# Patient Record
Sex: MALE | Race: WHITE | Employment: OTHER | URBAN - METROPOLITAN AREA
[De-identification: names, ages, dates, MRNs, and addresses within clinical notes are randomized per-mention and may not be internally consistent; named-entity substitution may affect disease eponyms.]

---

## 2017-02-10 DIAGNOSIS — E78.2 MIXED HYPERLIPIDEMIA: Primary | ICD-10-CM

## 2017-02-10 RX ORDER — ATORVASTATIN CALCIUM 20 MG/1
20 TABLET, FILM COATED ORAL DAILY
Qty: 90 TABLET | Refills: 1 | Status: SHIPPED | OUTPATIENT
Start: 2017-02-10 | End: 2017-08-06 | Stop reason: SDUPTHER

## 2017-03-03 DIAGNOSIS — I38 VALVULAR HEART DISEASE: ICD-10-CM

## 2017-03-03 DIAGNOSIS — I05.9 MITRAL VALVE DISEASE: ICD-10-CM

## 2017-03-03 DIAGNOSIS — I35.9 AORTIC VALVE DISEASE: Primary | ICD-10-CM

## 2017-04-20 ENCOUNTER — HOSPITAL ENCOUNTER (OUTPATIENT)
Dept: NON INVASIVE DIAGNOSTICS | Age: 77
Discharge: HOME | End: 2017-04-20
Attending: INTERNAL MEDICINE | Admitting: INTERNAL MEDICINE

## 2017-04-20 DIAGNOSIS — I35.9 NONRHEUMATIC AORTIC VALVE DISORDER: ICD-10-CM

## 2017-04-20 DIAGNOSIS — E78.2 MIXED HYPERLIPIDEMIA: ICD-10-CM

## 2017-04-20 DIAGNOSIS — Z12.5 SCREENING PSA (PROSTATE SPECIFIC ANTIGEN): ICD-10-CM

## 2017-04-20 LAB
A/G RATIO: 1.8 (ref 1.1–2.2)
ALBUMIN SERPL-MCNC: 4.3 G/DL (ref 3.4–5)
ALP BLD-CCNC: 118 U/L (ref 40–129)
ALT SERPL-CCNC: 20 U/L (ref 10–40)
ANION GAP SERPL CALCULATED.3IONS-SCNC: 13 MMOL/L (ref 3–16)
AST SERPL-CCNC: 28 U/L (ref 15–37)
BILIRUB SERPL-MCNC: 1 MG/DL (ref 0–1)
BUN BLDV-MCNC: 17 MG/DL (ref 7–20)
CALCIUM SERPL-MCNC: 9.2 MG/DL (ref 8.3–10.6)
CHLORIDE BLD-SCNC: 94 MMOL/L (ref 99–110)
CHOLESTEROL, TOTAL: 135 MG/DL (ref 0–199)
CO2: 27 MMOL/L (ref 21–32)
CREAT SERPL-MCNC: 1 MG/DL (ref 0.8–1.3)
GFR AFRICAN AMERICAN: >60
GFR NON-AFRICAN AMERICAN: >60
GLOBULIN: 2.4 G/DL
GLUCOSE BLD-MCNC: 100 MG/DL (ref 70–99)
HDLC SERPL-MCNC: 60 MG/DL (ref 40–60)
LDL CHOLESTEROL CALCULATED: 68 MG/DL
LV EF: 53 %
LVEF MODALITY: NORMAL
POTASSIUM SERPL-SCNC: 5.1 MMOL/L (ref 3.5–5.1)
PROSTATE SPECIFIC ANTIGEN: 2.73 NG/ML (ref 0–4)
SODIUM BLD-SCNC: 134 MMOL/L (ref 136–145)
TOTAL PROTEIN: 6.7 G/DL (ref 6.4–8.2)
TRIGL SERPL-MCNC: 33 MG/DL (ref 0–150)
VLDLC SERPL CALC-MCNC: 7 MG/DL

## 2017-04-21 ENCOUNTER — OFFICE VISIT (OUTPATIENT)
Dept: FAMILY MEDICINE CLINIC | Age: 77
End: 2017-04-21

## 2017-04-21 ENCOUNTER — OFFICE VISIT (OUTPATIENT)
Dept: CARDIOLOGY CLINIC | Age: 77
End: 2017-04-21

## 2017-04-21 VITALS
WEIGHT: 169.4 LBS | BODY MASS INDEX: 28.92 KG/M2 | HEART RATE: 89 BPM | TEMPERATURE: 97.8 F | HEIGHT: 64 IN | SYSTOLIC BLOOD PRESSURE: 120 MMHG | DIASTOLIC BLOOD PRESSURE: 70 MMHG | OXYGEN SATURATION: 97 %

## 2017-04-21 VITALS
DIASTOLIC BLOOD PRESSURE: 68 MMHG | SYSTOLIC BLOOD PRESSURE: 112 MMHG | HEART RATE: 82 BPM | BODY MASS INDEX: 29.01 KG/M2 | WEIGHT: 169 LBS

## 2017-04-21 DIAGNOSIS — I51.7 RIGHT VENTRICULAR ENLARGEMENT: Primary | ICD-10-CM

## 2017-04-21 DIAGNOSIS — E78.5 HYPERLIPIDEMIA, UNSPECIFIED HYPERLIPIDEMIA TYPE: ICD-10-CM

## 2017-04-21 DIAGNOSIS — N40.1 BPH WITH OBSTRUCTION/LOWER URINARY TRACT SYMPTOMS: ICD-10-CM

## 2017-04-21 DIAGNOSIS — S83.411A SPRAIN OF MEDIAL COLLATERAL LIGAMENT OF RIGHT KNEE, INITIAL ENCOUNTER: Primary | ICD-10-CM

## 2017-04-21 DIAGNOSIS — E78.2 MIXED HYPERLIPIDEMIA: ICD-10-CM

## 2017-04-21 DIAGNOSIS — I10 ESSENTIAL HYPERTENSION: ICD-10-CM

## 2017-04-21 DIAGNOSIS — Z95.2 S/P AVR: ICD-10-CM

## 2017-04-21 DIAGNOSIS — B35.1 ONYCHOMYCOSIS: ICD-10-CM

## 2017-04-21 DIAGNOSIS — N13.8 BPH WITH OBSTRUCTION/LOWER URINARY TRACT SYMPTOMS: ICD-10-CM

## 2017-04-21 DIAGNOSIS — R06.83 SNORING: ICD-10-CM

## 2017-04-21 PROCEDURE — 99214 OFFICE O/P EST MOD 30 MIN: CPT | Performed by: INTERNAL MEDICINE

## 2017-04-21 PROCEDURE — 99214 OFFICE O/P EST MOD 30 MIN: CPT | Performed by: FAMILY MEDICINE

## 2017-04-21 PROCEDURE — G8420 CALC BMI NORM PARAMETERS: HCPCS | Performed by: INTERNAL MEDICINE

## 2017-04-21 PROCEDURE — G8427 DOCREV CUR MEDS BY ELIG CLIN: HCPCS | Performed by: FAMILY MEDICINE

## 2017-04-21 PROCEDURE — G8420 CALC BMI NORM PARAMETERS: HCPCS | Performed by: FAMILY MEDICINE

## 2017-04-21 PROCEDURE — 4040F PNEUMOC VAC/ADMIN/RCVD: CPT | Performed by: FAMILY MEDICINE

## 2017-04-21 PROCEDURE — 4040F PNEUMOC VAC/ADMIN/RCVD: CPT | Performed by: INTERNAL MEDICINE

## 2017-04-21 PROCEDURE — 1036F TOBACCO NON-USER: CPT | Performed by: FAMILY MEDICINE

## 2017-04-21 PROCEDURE — 1123F ACP DISCUSS/DSCN MKR DOCD: CPT | Performed by: INTERNAL MEDICINE

## 2017-04-21 PROCEDURE — 1036F TOBACCO NON-USER: CPT | Performed by: INTERNAL MEDICINE

## 2017-04-21 PROCEDURE — 1123F ACP DISCUSS/DSCN MKR DOCD: CPT | Performed by: FAMILY MEDICINE

## 2017-04-21 PROCEDURE — G8427 DOCREV CUR MEDS BY ELIG CLIN: HCPCS | Performed by: INTERNAL MEDICINE

## 2017-04-21 RX ORDER — TERBINAFINE HYDROCHLORIDE 250 MG/1
250 TABLET ORAL DAILY
Qty: 30 TABLET | Refills: 3 | Status: SHIPPED | OUTPATIENT
Start: 2017-04-21 | End: 2017-08-31 | Stop reason: ALTCHOICE

## 2017-04-21 RX ORDER — TAMSULOSIN HYDROCHLORIDE 0.4 MG/1
0.4 CAPSULE ORAL DAILY
Qty: 90 CAPSULE | Refills: 1 | Status: SHIPPED | OUTPATIENT
Start: 2017-04-21 | End: 2017-08-04 | Stop reason: ALTCHOICE

## 2017-04-23 ASSESSMENT — ENCOUNTER SYMPTOMS
EYES NEGATIVE: 1
RESPIRATORY NEGATIVE: 1
GASTROINTESTINAL NEGATIVE: 1

## 2017-05-04 ENCOUNTER — TELEPHONE (OUTPATIENT)
Dept: CARDIOLOGY CLINIC | Age: 77
End: 2017-05-04

## 2017-05-04 DIAGNOSIS — I27.20 PULMONARY HYPERTENSION (HCC): ICD-10-CM

## 2017-05-04 DIAGNOSIS — I38 VALVULAR HEART DISEASE: ICD-10-CM

## 2017-05-04 DIAGNOSIS — G47.33 OSA (OBSTRUCTIVE SLEEP APNEA): Primary | ICD-10-CM

## 2017-05-04 DIAGNOSIS — G45.9 TRANSIENT CEREBRAL ISCHEMIA, UNSPECIFIED TYPE: ICD-10-CM

## 2017-08-02 DIAGNOSIS — I10 ESSENTIAL HYPERTENSION: ICD-10-CM

## 2017-08-02 DIAGNOSIS — E78.2 MIXED HYPERLIPIDEMIA: ICD-10-CM

## 2017-08-02 LAB
A/G RATIO: 1.7 (ref 1.1–2.2)
ALBUMIN SERPL-MCNC: 4.3 G/DL (ref 3.4–5)
ALP BLD-CCNC: 110 U/L (ref 40–129)
ALT SERPL-CCNC: 19 U/L (ref 10–40)
ANION GAP SERPL CALCULATED.3IONS-SCNC: 15 MMOL/L (ref 3–16)
AST SERPL-CCNC: 28 U/L (ref 15–37)
BILIRUB SERPL-MCNC: 0.6 MG/DL (ref 0–1)
BUN BLDV-MCNC: 20 MG/DL (ref 7–20)
CALCIUM SERPL-MCNC: 9.7 MG/DL (ref 8.3–10.6)
CHLORIDE BLD-SCNC: 93 MMOL/L (ref 99–110)
CHOLESTEROL, TOTAL: 152 MG/DL (ref 0–199)
CO2: 26 MMOL/L (ref 21–32)
CREAT SERPL-MCNC: 1 MG/DL (ref 0.8–1.3)
GFR AFRICAN AMERICAN: >60
GFR NON-AFRICAN AMERICAN: >60
GLOBULIN: 2.6 G/DL
GLUCOSE BLD-MCNC: 97 MG/DL (ref 70–99)
HDLC SERPL-MCNC: 54 MG/DL (ref 40–60)
LDL CHOLESTEROL CALCULATED: 89 MG/DL
POTASSIUM SERPL-SCNC: 5.4 MMOL/L (ref 3.5–5.1)
SODIUM BLD-SCNC: 134 MMOL/L (ref 136–145)
TOTAL PROTEIN: 6.9 G/DL (ref 6.4–8.2)
TRIGL SERPL-MCNC: 45 MG/DL (ref 0–150)
VLDLC SERPL CALC-MCNC: 9 MG/DL

## 2017-08-04 ENCOUNTER — OFFICE VISIT (OUTPATIENT)
Dept: FAMILY MEDICINE CLINIC | Age: 77
End: 2017-08-04

## 2017-08-04 VITALS
BODY MASS INDEX: 30.19 KG/M2 | SYSTOLIC BLOOD PRESSURE: 110 MMHG | HEIGHT: 63 IN | DIASTOLIC BLOOD PRESSURE: 72 MMHG | OXYGEN SATURATION: 97 % | HEART RATE: 92 BPM | WEIGHT: 170.4 LBS | TEMPERATURE: 98.2 F

## 2017-08-04 DIAGNOSIS — E78.2 MIXED HYPERLIPIDEMIA: ICD-10-CM

## 2017-08-04 DIAGNOSIS — G31.84 MILD COGNITIVE IMPAIRMENT: ICD-10-CM

## 2017-08-04 DIAGNOSIS — M17.10 ARTHRITIS OF KNEE: ICD-10-CM

## 2017-08-04 DIAGNOSIS — R04.0 EPISTAXIS: Primary | ICD-10-CM

## 2017-08-04 DIAGNOSIS — J30.1 SEASONAL ALLERGIC RHINITIS DUE TO POLLEN: ICD-10-CM

## 2017-08-04 DIAGNOSIS — N13.8 BPH WITH OBSTRUCTION/LOWER URINARY TRACT SYMPTOMS: ICD-10-CM

## 2017-08-04 DIAGNOSIS — N40.1 BPH WITH OBSTRUCTION/LOWER URINARY TRACT SYMPTOMS: ICD-10-CM

## 2017-08-04 DIAGNOSIS — I10 ESSENTIAL HYPERTENSION: ICD-10-CM

## 2017-08-04 PROCEDURE — 1036F TOBACCO NON-USER: CPT | Performed by: FAMILY MEDICINE

## 2017-08-04 PROCEDURE — G8417 CALC BMI ABV UP PARAM F/U: HCPCS | Performed by: FAMILY MEDICINE

## 2017-08-04 PROCEDURE — G8427 DOCREV CUR MEDS BY ELIG CLIN: HCPCS | Performed by: FAMILY MEDICINE

## 2017-08-04 PROCEDURE — 99214 OFFICE O/P EST MOD 30 MIN: CPT | Performed by: FAMILY MEDICINE

## 2017-08-04 PROCEDURE — 4040F PNEUMOC VAC/ADMIN/RCVD: CPT | Performed by: FAMILY MEDICINE

## 2017-08-04 PROCEDURE — 1123F ACP DISCUSS/DSCN MKR DOCD: CPT | Performed by: FAMILY MEDICINE

## 2017-08-05 ASSESSMENT — ENCOUNTER SYMPTOMS
GASTROINTESTINAL NEGATIVE: 1
RESPIRATORY NEGATIVE: 1
EYES NEGATIVE: 1

## 2017-08-06 DIAGNOSIS — E78.2 MIXED HYPERLIPIDEMIA: ICD-10-CM

## 2017-08-06 RX ORDER — ATORVASTATIN CALCIUM 20 MG/1
20 TABLET, FILM COATED ORAL DAILY
Qty: 90 TABLET | Refills: 1 | Status: SHIPPED | OUTPATIENT
Start: 2017-08-06 | End: 2018-02-12 | Stop reason: SDUPTHER

## 2017-08-26 ENCOUNTER — TELEPHONE (OUTPATIENT)
Dept: FAMILY MEDICINE CLINIC | Age: 77
End: 2017-08-26

## 2017-08-31 ENCOUNTER — OFFICE VISIT (OUTPATIENT)
Dept: FAMILY MEDICINE CLINIC | Age: 77
End: 2017-08-31

## 2017-08-31 VITALS
BODY MASS INDEX: 29.55 KG/M2 | TEMPERATURE: 97.6 F | SYSTOLIC BLOOD PRESSURE: 106 MMHG | HEIGHT: 63 IN | WEIGHT: 166.8 LBS | OXYGEN SATURATION: 92 % | DIASTOLIC BLOOD PRESSURE: 78 MMHG | HEART RATE: 101 BPM

## 2017-08-31 DIAGNOSIS — D64.9 ANEMIA, UNSPECIFIED TYPE: ICD-10-CM

## 2017-08-31 DIAGNOSIS — E87.1 HYPONATREMIA: ICD-10-CM

## 2017-08-31 DIAGNOSIS — A04.72 C. DIFFICILE COLITIS: Primary | ICD-10-CM

## 2017-08-31 PROCEDURE — 99213 OFFICE O/P EST LOW 20 MIN: CPT | Performed by: FAMILY MEDICINE

## 2017-08-31 PROCEDURE — 1036F TOBACCO NON-USER: CPT | Performed by: FAMILY MEDICINE

## 2017-08-31 PROCEDURE — G8427 DOCREV CUR MEDS BY ELIG CLIN: HCPCS | Performed by: FAMILY MEDICINE

## 2017-08-31 PROCEDURE — 1111F DSCHRG MED/CURRENT MED MERGE: CPT | Performed by: FAMILY MEDICINE

## 2017-08-31 PROCEDURE — 4040F PNEUMOC VAC/ADMIN/RCVD: CPT | Performed by: FAMILY MEDICINE

## 2017-08-31 PROCEDURE — G8510 SCR DEP NEG, NO PLAN REQD: HCPCS | Performed by: FAMILY MEDICINE

## 2017-08-31 PROCEDURE — G8417 CALC BMI ABV UP PARAM F/U: HCPCS | Performed by: FAMILY MEDICINE

## 2017-08-31 PROCEDURE — 1123F ACP DISCUSS/DSCN MKR DOCD: CPT | Performed by: FAMILY MEDICINE

## 2017-08-31 ASSESSMENT — ENCOUNTER SYMPTOMS
RESPIRATORY NEGATIVE: 1
GASTROINTESTINAL NEGATIVE: 1
EYES NEGATIVE: 1

## 2017-08-31 ASSESSMENT — PATIENT HEALTH QUESTIONNAIRE - PHQ9
2. FEELING DOWN, DEPRESSED OR HOPELESS: 0
SUM OF ALL RESPONSES TO PHQ9 QUESTIONS 1 & 2: 0
SUM OF ALL RESPONSES TO PHQ QUESTIONS 1-9: 0
1. LITTLE INTEREST OR PLEASURE IN DOING THINGS: 0

## 2017-09-11 DIAGNOSIS — D64.9 ANEMIA, UNSPECIFIED TYPE: ICD-10-CM

## 2017-09-11 DIAGNOSIS — E87.1 HYPONATREMIA: ICD-10-CM

## 2017-09-11 LAB
ALBUMIN SERPL-MCNC: 3.8 G/DL (ref 3.4–5)
ANION GAP SERPL CALCULATED.3IONS-SCNC: 14 MMOL/L (ref 3–16)
BASOPHILS ABSOLUTE: 0.1 K/UL (ref 0–0.2)
BASOPHILS RELATIVE PERCENT: 1.1 %
BUN BLDV-MCNC: 16 MG/DL (ref 7–20)
CALCIUM SERPL-MCNC: 9.4 MG/DL (ref 8.3–10.6)
CHLORIDE BLD-SCNC: 96 MMOL/L (ref 99–110)
CO2: 26 MMOL/L (ref 21–32)
CREAT SERPL-MCNC: 1 MG/DL (ref 0.8–1.3)
EOSINOPHILS ABSOLUTE: 0.2 K/UL (ref 0–0.6)
EOSINOPHILS RELATIVE PERCENT: 2 %
GFR AFRICAN AMERICAN: >60
GFR NON-AFRICAN AMERICAN: >60
GLUCOSE BLD-MCNC: 110 MG/DL (ref 70–99)
HCT VFR BLD CALC: 46.7 % (ref 40.5–52.5)
HEMOGLOBIN: 15.4 G/DL (ref 13.5–17.5)
LYMPHOCYTES ABSOLUTE: 1.3 K/UL (ref 1–5.1)
LYMPHOCYTES RELATIVE PERCENT: 16.1 %
MCH RBC QN AUTO: 30 PG (ref 26–34)
MCHC RBC AUTO-ENTMCNC: 33 G/DL (ref 31–36)
MCV RBC AUTO: 91 FL (ref 80–100)
MONOCYTES ABSOLUTE: 0.7 K/UL (ref 0–1.3)
MONOCYTES RELATIVE PERCENT: 9.4 %
NEUTROPHILS ABSOLUTE: 5.6 K/UL (ref 1.7–7.7)
NEUTROPHILS RELATIVE PERCENT: 71.4 %
PDW BLD-RTO: 14.5 % (ref 12.4–15.4)
PHOSPHORUS: 3.2 MG/DL (ref 2.5–4.9)
PLATELET # BLD: 387 K/UL (ref 135–450)
PMV BLD AUTO: 7.8 FL (ref 5–10.5)
POTASSIUM SERPL-SCNC: 4.9 MMOL/L (ref 3.5–5.1)
RBC # BLD: 5.14 M/UL (ref 4.2–5.9)
SODIUM BLD-SCNC: 136 MMOL/L (ref 136–145)
WBC # BLD: 7.8 K/UL (ref 4–11)

## 2017-09-29 ENCOUNTER — NURSE ONLY (OUTPATIENT)
Dept: FAMILY MEDICINE CLINIC | Age: 77
End: 2017-09-29

## 2017-09-29 DIAGNOSIS — Z23 NEED FOR INFLUENZA VACCINATION: Primary | ICD-10-CM

## 2017-09-29 PROCEDURE — 90662 IIV NO PRSV INCREASED AG IM: CPT | Performed by: FAMILY MEDICINE

## 2017-09-29 PROCEDURE — G0008 ADMIN INFLUENZA VIRUS VAC: HCPCS | Performed by: FAMILY MEDICINE

## 2017-10-30 DIAGNOSIS — I10 ESSENTIAL HYPERTENSION: ICD-10-CM

## 2017-10-30 DIAGNOSIS — E78.2 MIXED HYPERLIPIDEMIA: ICD-10-CM

## 2017-10-30 LAB
A/G RATIO: 1.7 (ref 1.1–2.2)
ALBUMIN SERPL-MCNC: 4.2 G/DL (ref 3.4–5)
ALP BLD-CCNC: 122 U/L (ref 40–129)
ALT SERPL-CCNC: 22 U/L (ref 10–40)
ANION GAP SERPL CALCULATED.3IONS-SCNC: 15 MMOL/L (ref 3–16)
AST SERPL-CCNC: 25 U/L (ref 15–37)
BILIRUB SERPL-MCNC: 0.5 MG/DL (ref 0–1)
BUN BLDV-MCNC: 25 MG/DL (ref 7–20)
CALCIUM SERPL-MCNC: 9.3 MG/DL (ref 8.3–10.6)
CHLORIDE BLD-SCNC: 96 MMOL/L (ref 99–110)
CHOLESTEROL, TOTAL: 147 MG/DL (ref 0–199)
CO2: 25 MMOL/L (ref 21–32)
CREAT SERPL-MCNC: 1 MG/DL (ref 0.8–1.3)
GFR AFRICAN AMERICAN: >60
GFR NON-AFRICAN AMERICAN: >60
GLOBULIN: 2.5 G/DL
GLUCOSE BLD-MCNC: 113 MG/DL (ref 70–99)
HDLC SERPL-MCNC: 45 MG/DL (ref 40–60)
LDL CHOLESTEROL CALCULATED: 91 MG/DL
POTASSIUM SERPL-SCNC: 5.1 MMOL/L (ref 3.5–5.1)
SODIUM BLD-SCNC: 136 MMOL/L (ref 136–145)
TOTAL PROTEIN: 6.7 G/DL (ref 6.4–8.2)
TRIGL SERPL-MCNC: 56 MG/DL (ref 0–150)
VLDLC SERPL CALC-MCNC: 11 MG/DL

## 2017-11-02 ENCOUNTER — OFFICE VISIT (OUTPATIENT)
Dept: FAMILY MEDICINE CLINIC | Age: 77
End: 2017-11-02

## 2017-11-02 ENCOUNTER — TELEPHONE (OUTPATIENT)
Dept: CARDIOLOGY CLINIC | Age: 77
End: 2017-11-02

## 2017-11-02 VITALS
TEMPERATURE: 98.7 F | WEIGHT: 171.8 LBS | DIASTOLIC BLOOD PRESSURE: 78 MMHG | BODY MASS INDEX: 30.43 KG/M2 | SYSTOLIC BLOOD PRESSURE: 118 MMHG

## 2017-11-02 DIAGNOSIS — G31.84 MILD COGNITIVE IMPAIRMENT: ICD-10-CM

## 2017-11-02 DIAGNOSIS — E78.2 MIXED HYPERLIPIDEMIA: ICD-10-CM

## 2017-11-02 DIAGNOSIS — D22.4 NEVUS OF SCALP: Primary | ICD-10-CM

## 2017-11-02 DIAGNOSIS — I10 ESSENTIAL HYPERTENSION: ICD-10-CM

## 2017-11-02 DIAGNOSIS — G47.33 OBSTRUCTIVE SLEEP APNEA: ICD-10-CM

## 2017-11-02 DIAGNOSIS — A04.72 C. DIFFICILE DIARRHEA: ICD-10-CM

## 2017-11-02 PROCEDURE — 4040F PNEUMOC VAC/ADMIN/RCVD: CPT | Performed by: FAMILY MEDICINE

## 2017-11-02 PROCEDURE — G8427 DOCREV CUR MEDS BY ELIG CLIN: HCPCS | Performed by: FAMILY MEDICINE

## 2017-11-02 PROCEDURE — 1036F TOBACCO NON-USER: CPT | Performed by: FAMILY MEDICINE

## 2017-11-02 PROCEDURE — 1123F ACP DISCUSS/DSCN MKR DOCD: CPT | Performed by: FAMILY MEDICINE

## 2017-11-02 PROCEDURE — G8417 CALC BMI ABV UP PARAM F/U: HCPCS | Performed by: FAMILY MEDICINE

## 2017-11-02 PROCEDURE — 99214 OFFICE O/P EST MOD 30 MIN: CPT | Performed by: FAMILY MEDICINE

## 2017-11-02 PROCEDURE — G8484 FLU IMMUNIZE NO ADMIN: HCPCS | Performed by: FAMILY MEDICINE

## 2017-11-02 ASSESSMENT — ENCOUNTER SYMPTOMS
RESPIRATORY NEGATIVE: 1
EYES NEGATIVE: 1
GASTROINTESTINAL NEGATIVE: 1

## 2017-11-02 NOTE — TELEPHONE ENCOUNTER
Patient stoped in office and said he say Dr. J Luis Pisano 4/21/17 and never made an appointment after and would like to know when he should schedulde up again.  Please call [phone]

## 2017-11-02 NOTE — PROGRESS NOTES
11/2/17    Hima Odonnell  1940      Chief Complaint   Patient presents with    Hyperlipidemia     Skin Lesion: Patient complains of a skin lesion of the scalp. The lesion has been present for several months. Lesion has not changed in several months. Symptoms associated with the lesion are: none. Patient denies increasing diameter, increasing thickness, increasing number of lesions, darkening color, itching, unsightliness, none. Sleep Apnea: Patient presents with possible obstructive sleep apnea. Patent has a several year history of symptoms of daytime fatigue and morning fatigue. Patient generally gets 7 or 8 hours of sleep per night, and states they generally have difficulty falling asleep and generally restful sleep. Snoring of moderate severity is present. Apneic episodes are present. Nasal obstruction is not present. Patient has not had tonsillectomy. Dyslipidemia: Patient presents for evaluation of lipids. Compliance with treatment thus far has been good. A repeat fasting lipid profile was done. The patient does not use medications that may worsen dyslipidemias (corticosteroids, progestins, anabolic steroids, diuretics, beta-blockers, amiodarone, cyclosporine, olanzapine). The patient exercises intermittently. The patient is not known to have coexisting coronary artery disease. Hypertension: Patient here for follow-up of elevated blood pressure. He is exercising and is adherent to low salt diet. Blood pressure is well controlled at home. Cardiac symptoms none. Patient denies chest pain, claudication, exertional chest pressure/discomfort, irregular heart beat and lower extremity edema. Cardiovascular risk factors: advanced age (older than 54 for men, 72 for women). Use of agents associated with hypertension: none. History of target organ damage: none.     Vitals:    11/02/17 0833   BP: 118/78   Temp: 98.7 °F (37.1 °C)         Immunization History   Administered Date(s) Administered    Influenza Virus Vaccine 10/13/2006, 10/07/2010, 10/20/2012, 10/18/2013    Influenza, High Dose 09/25/2015, 10/03/2016, 09/29/2017    Influenza, Quadv, 3 Years and older, IM 10/13/2006    Pneumococcal 13-valent Conjugate ([id number]) 09/25/2015    Pneumococcal Polysaccharide ([id number]) 10/13/2003, 10/13/2006, 10/07/2010    Td 04/17/2000    Tdap (Boostrix, Adacel) 11/17/2000, 02/29/2012       Allergies   Allergen Reactions    Demerol     Oxycodone Hcl     Flomax [Tamsulosin Hcl]     Meperidine Nausea And Vomiting     Outpatient Prescriptions Marked as Taking for the 11/2/17 encounter (Office Visit) with Delmy Babb MD   Medication Sig Dispense Refill    atorvastatin (LIPITOR) 20 MG tablet Take 1 tablet by mouth daily 90 tablet 1    Flaxseed, Linseed, 1000 MG CAPS Take 1 capsule by mouth 2 times daily.  multivitamin (THERAGRAN) per tablet Take 1 tablet by mouth daily.  aspirin 81 MG tablet Take 81 mg by mouth daily.  Psyllium (METAMUCIL PO) Take 2 Units by mouth daily. 2 TSP. Past Medical History:   Diagnosis Date    Aortic valve disorder     Arthritis     Blood transfusion     BPH (benign prostatic hypertrophy)     Cancer (HCC)     SKIN. 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    Diverticulosis of colon     Essential hypertension 1/17/2013    History of GI bleed 4/2012    Hydronephrosis, left 7/2/2016    Hyperlipidemia     Mitral valve disorder     MSSA (methicillin susceptible Staphylococcus aureus) septicemia ([facility] [address] 3/2012    Osteoarthritis     neck, back, knees    Staph aureus infection 3/10/12    blood    TIA (transient ischemic attack) 5/2012     Past Surgical History:   Procedure Laterality Date    AORTIC VALVE SURGERY      CARDIAC SURGERY  6/2012    angiogram-no blockage    COLONOSCOPY      HERNIA REPAIR      LEFT INGUINAL.     JOINT REPLACEMENT      RIGHT KNEE TOTAL 11/05, left knee 2006    KNEE PROSTHESIS REMOVAL Left     KNEE SURGERY  4/2012    to remove blood clot on right knee    KNEE SURGERY  3/2012    for MSSA infection bilat. knees    MITRAL VALVE SURGERY      OTHER SURGICAL HISTORY  07/03/2016    CYSTOSCOPY, LEFT URETEROSCOPY STONE MANIPULATION WITH LASER,    TOTAL KNEE ARTHROPLASTY      bilat    VASECTOMY       Family History   Problem Relation Age of Onset    Stroke Mother     Stroke Father     Heart Disease Father     Cancer Brother      skin     Social History     Social History    Marital status:      Spouse name: N/A    Number of children: N/A    Years of education: N/A     Occupational History    Not on file. Social History Main Topics    Smoking status: Never Smoker    Smokeless tobacco: Never Used    Alcohol use 2.4 - 3.0 oz/week     2 - 3 Glasses of wine, 2 Cans of beer per week      Comment: drinks every other day    Drug use: No    Sexual activity: Not on file     Other Topics Concern    Not on file     Social History Narrative    No narrative on file         Any recent diagnostic tests, hospital reports, office notes or consultation letters were reviewed prior to and during the visit. Review of Systems   Constitutional: Negative. HENT: Negative. Eyes: Negative. Respiratory: Negative. Cardiovascular: Negative. Gastrointestinal: Negative. Genitourinary: Negative. Musculoskeletal: Negative. Psychiatric/Behavioral: Negative. Physical Exam   Constitutional: He appears well-developed and well-nourished. No distress. HENT:   Head:       Neck: Normal range of motion. Neck supple. Normal carotid pulses, no hepatojugular reflux and no JVD present. Carotid bruit is not present. No tracheal deviation present. No thyromegaly present. Cardiovascular: Normal rate, regular rhythm, S2 normal, normal heart sounds and intact distal pulses. PMI is not displaced. Exam reveals no gallop and no friction rub. No murmur heard.   Pulses:       Carotid pulses are 2+ on the right side, and 2+ on the left side. Dorsalis pedis pulses are 2+ on the right side, and 2+ on the left side. Posterior tibial pulses are 2+ on the right side, and 2+ on the left side. Pulmonary/Chest: Effort normal and breath sounds normal. No stridor. No respiratory distress. He has no wheezes. He has no rales. He exhibits no tenderness. Abdominal: Soft. Bowel sounds are normal. He exhibits no distension and no mass. There is no tenderness. There is no rebound and no guarding. Musculoskeletal:        Right ankle: He exhibits no swelling. Left ankle: He exhibits no swelling. Lymphadenopathy:     He has no cervical adenopathy. Skin: He is not diaphoretic. Psychiatric: He has a normal mood and affect. His behavior is normal. Judgment and thought content normal.         1. Nevus of scalp  Condition stable continue the medications, treatments, will check labs as appropriate  Benign     2. Obstructive sleep apnea  Condition stable continue the medications, treatments, will check labs as appropriate       3. Mild cognitive impairment  Condition stable continue the medications, treatments, will check labs as appropriate       4. Mixed hyperlipidemia  Condition stable continue the medications, treatments, will check labs as appropriate       The patient received counseling on the following healthy behaviors: nutrition, exercise, medication adherence and decrease in alcohol consumption    Patient given educational materials on Hyperlipidemia and Nutrition if appropriate    I have instructed the patient to complete a self tracking handout on diet and instructed them to bring it with them to the  next appointment. Discussed use, benefit, and side effects of prescribed medications. Barriers to medication compliance addressed. All patient questions answered. Pt voiced understanding. Lipid Panel    Comprehensive Metabolic Panel   5.  Essential hypertension

## 2017-12-11 ENCOUNTER — HOSPITAL ENCOUNTER (OUTPATIENT)
Dept: OTHER | Age: 77
Discharge: HOME | End: 2017-12-11
Attending: FAMILY MEDICINE | Admitting: FAMILY MEDICINE

## 2017-12-11 ENCOUNTER — OFFICE VISIT (OUTPATIENT)
Dept: FAMILY MEDICINE CLINIC | Age: 77
End: 2017-12-11

## 2017-12-11 VITALS
WEIGHT: 174.6 LBS | SYSTOLIC BLOOD PRESSURE: 126 MMHG | DIASTOLIC BLOOD PRESSURE: 70 MMHG | BODY MASS INDEX: 30.93 KG/M2 | TEMPERATURE: 98.3 F

## 2017-12-11 DIAGNOSIS — S76.012A STRAIN OF LEFT HIP, INITIAL ENCOUNTER: ICD-10-CM

## 2017-12-11 DIAGNOSIS — S83.411A SPRAIN OF MEDIAL COLLATERAL LIGAMENT OF RIGHT KNEE, INITIAL ENCOUNTER: Primary | ICD-10-CM

## 2017-12-11 PROCEDURE — 4040F PNEUMOC VAC/ADMIN/RCVD: CPT | Performed by: FAMILY MEDICINE

## 2017-12-11 PROCEDURE — G8417 CALC BMI ABV UP PARAM F/U: HCPCS | Performed by: FAMILY MEDICINE

## 2017-12-11 PROCEDURE — G8427 DOCREV CUR MEDS BY ELIG CLIN: HCPCS | Performed by: FAMILY MEDICINE

## 2017-12-11 PROCEDURE — 99213 OFFICE O/P EST LOW 20 MIN: CPT | Performed by: FAMILY MEDICINE

## 2017-12-11 PROCEDURE — 1123F ACP DISCUSS/DSCN MKR DOCD: CPT | Performed by: FAMILY MEDICINE

## 2017-12-11 PROCEDURE — G8484 FLU IMMUNIZE NO ADMIN: HCPCS | Performed by: FAMILY MEDICINE

## 2017-12-11 PROCEDURE — 1036F TOBACCO NON-USER: CPT | Performed by: FAMILY MEDICINE

## 2017-12-13 ASSESSMENT — ENCOUNTER SYMPTOMS
EYES NEGATIVE: 1
GASTROINTESTINAL NEGATIVE: 1
RESPIRATORY NEGATIVE: 1

## 2017-12-13 NOTE — PROGRESS NOTES
12/13/17    Calli Aaron  1940      Chief Complaint   Patient presents with    Knee Pain     Rt knee pain    Hip Pain     Left hip pain    Knee Pain: Patient presents with knee pain involving the  right knee. Onset of the symptoms was several weeks ago. Inciting event: none known. Current symptoms include pain located meidlly . Pain is aggravated by any weight bearing. Patient has had prior knee problems. Evaluation to date: none. Treatment to date: avoidance of offending activity. Hip Pain: Patient complains of left hip pain. Onset of the symptoms was several weeks ago. Inciting event: none. Current symptoms include is worse with weight bearing. Associated symptoms: none. Aggravating symptoms: any weight bearing. Patient's overall course: symptoms have progressed to a point and plateaued. Patient has had prior hip problems. Previous visits for this problem: none. Evaluation to date: none. Treatment to date: OTC analgesics, which have been not very effective.       Vitals:    12/11/17 1543   BP: 126/70   Temp: 98.3 °F (36.8 °C)         Immunization History   Administered Date(s) Administered    Influenza Virus Vaccine 10/13/2006, 10/07/2010, 10/20/2012, 10/18/2013    Influenza, High Dose 09/25/2015, 10/03/2016, 09/29/2017    Influenza, Quadv, 3 Years and older, IM 10/13/2006    Pneumococcal 13-valent Conjugate ([id number]) 09/25/2015    Pneumococcal Polysaccharide ([id number]) 10/13/2003, 10/13/2006, 10/07/2010    Td 04/17/2000    Tdap (Boostrix, Adacel) 11/17/2000, 02/29/2012       Allergies   Allergen Reactions    Demerol     Oxycodone Hcl     Flomax [Tamsulosin Hcl]     Meperidine Nausea And Vomiting     Outpatient Prescriptions Marked as Taking for the 12/11/17 encounter (Office Visit) with Americo Sutton MD   Medication Sig Dispense Refill    atorvastatin (LIPITOR) 20 MG tablet Take 1 tablet by mouth daily 90 tablet 1    Flaxseed, Linseed, 1000 MG CAPS Take 1 capsule by mouth 2 times daily.  multivitamin (THERAGRAN) per tablet Take 1 tablet by mouth daily.  aspirin 81 MG tablet Take 81 mg by mouth daily.  Psyllium (METAMUCIL PO) Take 2 Units by mouth daily. 2 TSP. Past Medical History:   Diagnosis Date    Aortic valve disorder     Arthritis     Blood transfusion     BPH (benign prostatic hypertrophy)     Cancer (HCC)     SKIN. 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    Diverticulosis of colon     Essential hypertension 1/17/2013    History of GI bleed 4/2012    Hydronephrosis, left 7/2/2016    Hyperlipidemia     Mitral valve disorder     MSSA (methicillin susceptible Staphylococcus aureus) septicemia ([facility] [address] 3/2012    Osteoarthritis     neck, back, knees    Staph aureus infection 3/10/12    blood    TIA (transient ischemic attack) 5/2012     Past Surgical History:   Procedure Laterality Date    AORTIC VALVE SURGERY      CARDIAC SURGERY  6/2012    angiogram-no blockage    COLONOSCOPY      HERNIA REPAIR      LEFT INGUINAL.  JOINT REPLACEMENT      RIGHT KNEE TOTAL 11/05, left knee 2006    KNEE PROSTHESIS REMOVAL Left     KNEE SURGERY  4/2012    to remove blood clot on right knee    KNEE SURGERY  3/2012    for MSSA infection bilat. knees    MITRAL VALVE SURGERY      OTHER SURGICAL HISTORY  07/03/2016    CYSTOSCOPY, LEFT URETEROSCOPY STONE MANIPULATION WITH LASER,    TOTAL KNEE ARTHROPLASTY      bilat    VASECTOMY       Family History   Problem Relation Age of Onset    Stroke Mother     Stroke Father     Heart Disease Father     Cancer Brother      skin     Social History     Social History    Marital status:      Spouse name: N/A    Number of children: N/A    Years of education: N/A     Occupational History    Not on file.      Social History Main Topics    Smoking status: Never Smoker    Smokeless tobacco: Never Used    Alcohol use 2.4 - 3.0 oz/week     2 - 3 Glasses of wine, 2 Cans of beer per week Therapy   2. Strain of left hip, initial encounter  The condition is deteriorating, will change treatment, investigate cause and make further recommendations when data back.    Ambulatory referral to Physical Therapy    XR HIP 2-3 VW W PELVIS LEFT    CANCELED: XR HIP LEFT (2-3 VIEWS)             Melinda La MD

## 2017-12-15 ENCOUNTER — OFFICE VISIT (OUTPATIENT)
Dept: CARDIOLOGY CLINIC | Age: 77
End: 2017-12-15

## 2017-12-15 VITALS
BODY MASS INDEX: 30.65 KG/M2 | DIASTOLIC BLOOD PRESSURE: 70 MMHG | HEART RATE: 68 BPM | SYSTOLIC BLOOD PRESSURE: 120 MMHG | WEIGHT: 173 LBS

## 2017-12-15 DIAGNOSIS — I38 VALVULAR HEART DISEASE: Primary | ICD-10-CM

## 2017-12-15 DIAGNOSIS — G47.33 OSA (OBSTRUCTIVE SLEEP APNEA): ICD-10-CM

## 2017-12-15 DIAGNOSIS — Z95.2 S/P AVR: ICD-10-CM

## 2017-12-15 DIAGNOSIS — E78.2 MIXED HYPERLIPIDEMIA: ICD-10-CM

## 2017-12-15 PROCEDURE — G8427 DOCREV CUR MEDS BY ELIG CLIN: HCPCS | Performed by: INTERNAL MEDICINE

## 2017-12-15 PROCEDURE — 1123F ACP DISCUSS/DSCN MKR DOCD: CPT | Performed by: INTERNAL MEDICINE

## 2017-12-15 PROCEDURE — 1036F TOBACCO NON-USER: CPT | Performed by: INTERNAL MEDICINE

## 2017-12-15 PROCEDURE — 99214 OFFICE O/P EST MOD 30 MIN: CPT | Performed by: INTERNAL MEDICINE

## 2017-12-15 PROCEDURE — 4040F PNEUMOC VAC/ADMIN/RCVD: CPT | Performed by: INTERNAL MEDICINE

## 2017-12-15 PROCEDURE — G8484 FLU IMMUNIZE NO ADMIN: HCPCS | Performed by: INTERNAL MEDICINE

## 2017-12-15 PROCEDURE — G8417 CALC BMI ABV UP PARAM F/U: HCPCS | Performed by: INTERNAL MEDICINE

## 2018-02-12 DIAGNOSIS — E78.2 MIXED HYPERLIPIDEMIA: ICD-10-CM

## 2018-02-12 RX ORDER — ATORVASTATIN CALCIUM 20 MG/1
20 TABLET, FILM COATED ORAL DAILY
Qty: 90 TABLET | Refills: 1 | Status: SHIPPED | OUTPATIENT
Start: 2018-02-12 | End: 2018-07-13 | Stop reason: SDUPTHER

## 2018-02-28 DIAGNOSIS — E78.2 MIXED HYPERLIPIDEMIA: ICD-10-CM

## 2018-02-28 DIAGNOSIS — I10 ESSENTIAL HYPERTENSION: ICD-10-CM

## 2018-02-28 LAB
A/G RATIO: 1.6 (ref 1.1–2.2)
ALBUMIN SERPL-MCNC: 4.3 G/DL (ref 3.4–5)
ALP BLD-CCNC: 126 U/L (ref 40–129)
ALT SERPL-CCNC: 22 U/L (ref 10–40)
ANION GAP SERPL CALCULATED.3IONS-SCNC: 11 MMOL/L (ref 3–16)
AST SERPL-CCNC: 30 U/L (ref 15–37)
BILIRUB SERPL-MCNC: 0.9 MG/DL (ref 0–1)
BUN BLDV-MCNC: 16 MG/DL (ref 7–20)
CALCIUM SERPL-MCNC: 9.4 MG/DL (ref 8.3–10.6)
CHLORIDE BLD-SCNC: 97 MMOL/L (ref 99–110)
CHOLESTEROL, TOTAL: 144 MG/DL (ref 0–199)
CO2: 28 MMOL/L (ref 21–32)
CREAT SERPL-MCNC: 1 MG/DL (ref 0.8–1.3)
GFR AFRICAN AMERICAN: >60
GFR NON-AFRICAN AMERICAN: >60
GLOBULIN: 2.7 G/DL
GLUCOSE BLD-MCNC: 98 MG/DL (ref 70–99)
HDLC SERPL-MCNC: 51 MG/DL (ref 40–60)
LDL CHOLESTEROL CALCULATED: 82 MG/DL
POTASSIUM SERPL-SCNC: 5.2 MMOL/L (ref 3.5–5.1)
SODIUM BLD-SCNC: 136 MMOL/L (ref 136–145)
TOTAL PROTEIN: 7 G/DL (ref 6.4–8.2)
TRIGL SERPL-MCNC: 54 MG/DL (ref 0–150)
VLDLC SERPL CALC-MCNC: 11 MG/DL

## 2018-03-02 ENCOUNTER — OFFICE VISIT (OUTPATIENT)
Dept: FAMILY MEDICINE CLINIC | Age: 78
End: 2018-03-02

## 2018-03-02 VITALS
TEMPERATURE: 98.7 F | DIASTOLIC BLOOD PRESSURE: 78 MMHG | WEIGHT: 171.6 LBS | BODY MASS INDEX: 30.4 KG/M2 | SYSTOLIC BLOOD PRESSURE: 126 MMHG

## 2018-03-02 DIAGNOSIS — M15.9 GENERALIZED OSTEOARTHROSIS, INVOLVING MULTIPLE SITES: ICD-10-CM

## 2018-03-02 DIAGNOSIS — I10 ESSENTIAL HYPERTENSION: Primary | ICD-10-CM

## 2018-03-02 DIAGNOSIS — G31.84 MILD COGNITIVE IMPAIRMENT: ICD-10-CM

## 2018-03-02 DIAGNOSIS — E78.2 MIXED HYPERLIPIDEMIA: ICD-10-CM

## 2018-03-02 PROCEDURE — 4040F PNEUMOC VAC/ADMIN/RCVD: CPT | Performed by: FAMILY MEDICINE

## 2018-03-02 PROCEDURE — G8417 CALC BMI ABV UP PARAM F/U: HCPCS | Performed by: FAMILY MEDICINE

## 2018-03-02 PROCEDURE — 1123F ACP DISCUSS/DSCN MKR DOCD: CPT | Performed by: FAMILY MEDICINE

## 2018-03-02 PROCEDURE — G8484 FLU IMMUNIZE NO ADMIN: HCPCS | Performed by: FAMILY MEDICINE

## 2018-03-02 PROCEDURE — G8427 DOCREV CUR MEDS BY ELIG CLIN: HCPCS | Performed by: FAMILY MEDICINE

## 2018-03-02 PROCEDURE — 99214 OFFICE O/P EST MOD 30 MIN: CPT | Performed by: FAMILY MEDICINE

## 2018-03-02 PROCEDURE — 1036F TOBACCO NON-USER: CPT | Performed by: FAMILY MEDICINE

## 2018-03-04 ASSESSMENT — ENCOUNTER SYMPTOMS
GASTROINTESTINAL NEGATIVE: 1
RESPIRATORY NEGATIVE: 1
EYES NEGATIVE: 1

## 2018-03-04 NOTE — PROGRESS NOTES
3/4/18    Sushila Burleson  1940      Chief Complaint   Patient presents with    Hyperlipidemia     Hypertension: Patient here for follow-up of elevated blood pressure. He is exercising and is adherent to low salt diet. Blood pressure is well controlled at home. Cardiac symptoms none. Patient denies chest pain, claudication, exertional chest pressure/discomfort, fatigue and irregular heart beat. Cardiovascular risk factors: dyslipidemia and hypertension. Use of agents associated with hypertension: none. History of target organ damage: none. Dyslipidemia: Patient presents for evaluation of lipids. Compliance with treatment thus far has been good. A repeat fasting lipid profile was done. The patient does not use medications that may worsen dyslipidemias (corticosteroids, progestins, anabolic steroids, diuretics, beta-blockers, amiodarone, cyclosporine, olanzapine). The patient exercises intermittently. The patient is not known to have coexisting coronary artery disease. Joint/Muscle Pain: Patient complains of arthralgias for which has been present for several years. Pain is located in multiple joints, is described as aching, and is intermittent . Associated symptoms include: crepitation, decreased range of motion and deformity. The patient has tried NSAIDs for pain, with moderate relief.   Related to injury:   no.    /78   Temp 98.7 °F (37.1 °C) (Oral)   Wt 171 lb 9.6 oz (77.8 kg)   BMI 30.40 kg/m²       Immunization History   Administered Date(s) Administered    Influenza Virus Vaccine 10/13/2006, 10/07/2010, 10/20/2012, 10/18/2013    Influenza, High Dose 09/25/2015, 10/03/2016, 09/29/2017    Influenza, Quadv, 3 Years and older, IM 10/13/2006    Pneumococcal 13-valent Conjugate ([id number]) 09/25/2015    Pneumococcal Polysaccharide ([id number]) 10/13/2003, 10/13/2006, 10/07/2010    Td 04/17/2000    Tdap (Boostrix, Adacel) 11/17/2000, 02/29/2012       Allergies   Allergen Problem Relation Age of Onset    Stroke Mother     Stroke Father     Heart Disease Father     Cancer Brother      skin     Social History     Social History    Marital status:      Spouse name: N/A    Number of children: N/A    Years of education: N/A     Occupational History    Not on file. Social History Main Topics    Smoking status: Never Smoker    Smokeless tobacco: Never Used    Alcohol use 2.4 - 3.0 oz/week     2 - 3 Glasses of wine, 2 Cans of beer per week      Comment: drinks every other day    Drug use: No    Sexual activity: Not on file     Other Topics Concern    Not on file     Social History Narrative    No narrative on file         Any recent diagnostic tests, hospital reports, office notes or consultation letters were reviewed prior to and during the visit. Review of Systems   Constitutional: Negative. HENT: Negative. Eyes: Negative. Respiratory: Negative. Cardiovascular: Negative. Gastrointestinal: Negative. Genitourinary: Negative. Musculoskeletal: Negative. Psychiatric/Behavioral: Negative. Physical Exam   Constitutional: He appears well-developed and well-nourished. No distress. Neck: Normal range of motion. Neck supple. Normal carotid pulses, no hepatojugular reflux and no JVD present. Carotid bruit is not present. No tracheal deviation present. No thyromegaly present. Cardiovascular: Normal rate, regular rhythm, S2 normal, normal heart sounds and intact distal pulses. PMI is not displaced. Exam reveals no gallop and no friction rub. No murmur heard. Pulses:       Carotid pulses are 2+ on the right side, and 2+ on the left side. Dorsalis pedis pulses are 2+ on the right side, and 2+ on the left side. Posterior tibial pulses are 2+ on the right side, and 2+ on the left side. Pulmonary/Chest: Effort normal and breath sounds normal. No stridor. No respiratory distress. He has no wheezes. He has no rales.  He as appropriate       The patient received counseling on the following healthy behaviors: nutrition, exercise, medication adherence and decrease in alcohol consumption    Patient given educational materials on Hyperlipidemia and Nutrition if appropriate    I have instructed the patient to complete a self tracking handout on diet and instructed them to bring it with them to the  next appointment. Discussed use, benefit, and side effects of prescribed medications. Barriers to medication compliance addressed. All patient questions answered. Pt voiced understanding. Lipid Panel    Comprehensive Metabolic Panel   3. Mild cognitive impairment  Condition stable continue the medications, treatments, will check labs as appropriate       4. Generalized osteoarthrosis, involving multiple sites  Condition stable continue the medications, treatments, will check labs as appropriate           . valente Kuo MD

## 2018-04-09 ENCOUNTER — HOSPITAL ENCOUNTER (OUTPATIENT)
Dept: NON INVASIVE DIAGNOSTICS | Age: 78
Discharge: HOME | End: 2018-04-09
Attending: INTERNAL MEDICINE | Admitting: INTERNAL MEDICINE

## 2018-04-09 DIAGNOSIS — I38 ENDOCARDITIS: ICD-10-CM

## 2018-04-09 LAB
LV EF: 50 %
LVEF MODALITY: NORMAL

## 2018-07-02 ENCOUNTER — OFFICE VISIT (OUTPATIENT)
Dept: CARDIOLOGY CLINIC | Age: 78
End: 2018-07-02

## 2018-07-02 VITALS
OXYGEN SATURATION: 98 % | HEART RATE: 74 BPM | SYSTOLIC BLOOD PRESSURE: 126 MMHG | BODY MASS INDEX: 29.94 KG/M2 | DIASTOLIC BLOOD PRESSURE: 78 MMHG | WEIGHT: 169 LBS

## 2018-07-02 DIAGNOSIS — E78.2 MIXED HYPERLIPIDEMIA: ICD-10-CM

## 2018-07-02 DIAGNOSIS — I10 ESSENTIAL HYPERTENSION: ICD-10-CM

## 2018-07-02 DIAGNOSIS — Z95.2 S/P AVR: Primary | ICD-10-CM

## 2018-07-02 LAB
A/G RATIO: 1.6 (ref 1.1–2.2)
ALBUMIN SERPL-MCNC: 4.2 G/DL (ref 3.4–5)
ALP BLD-CCNC: 125 U/L (ref 40–129)
ALT SERPL-CCNC: 23 U/L (ref 10–40)
ANION GAP SERPL CALCULATED.3IONS-SCNC: 12 MMOL/L (ref 3–16)
AST SERPL-CCNC: 31 U/L (ref 15–37)
BILIRUB SERPL-MCNC: 1 MG/DL (ref 0–1)
BUN BLDV-MCNC: 16 MG/DL (ref 7–20)
CALCIUM SERPL-MCNC: 9.5 MG/DL (ref 8.3–10.6)
CHLORIDE BLD-SCNC: 98 MMOL/L (ref 99–110)
CHOLESTEROL, TOTAL: 151 MG/DL (ref 0–199)
CO2: 25 MMOL/L (ref 21–32)
CREAT SERPL-MCNC: 0.9 MG/DL (ref 0.8–1.3)
GFR AFRICAN AMERICAN: >60
GFR NON-AFRICAN AMERICAN: >60
GLOBULIN: 2.7 G/DL
GLUCOSE BLD-MCNC: 107 MG/DL (ref 70–99)
HDLC SERPL-MCNC: 62 MG/DL (ref 40–60)
LDL CHOLESTEROL CALCULATED: 79 MG/DL
POTASSIUM SERPL-SCNC: 4.8 MMOL/L (ref 3.5–5.1)
SODIUM BLD-SCNC: 135 MMOL/L (ref 136–145)
TOTAL PROTEIN: 6.9 G/DL (ref 6.4–8.2)
TRIGL SERPL-MCNC: 49 MG/DL (ref 0–150)
VLDLC SERPL CALC-MCNC: 10 MG/DL

## 2018-07-02 PROCEDURE — 99213 OFFICE O/P EST LOW 20 MIN: CPT | Performed by: INTERNAL MEDICINE

## 2018-07-02 PROCEDURE — 1123F ACP DISCUSS/DSCN MKR DOCD: CPT | Performed by: INTERNAL MEDICINE

## 2018-07-02 PROCEDURE — 4040F PNEUMOC VAC/ADMIN/RCVD: CPT | Performed by: INTERNAL MEDICINE

## 2018-07-02 PROCEDURE — G8417 CALC BMI ABV UP PARAM F/U: HCPCS | Performed by: INTERNAL MEDICINE

## 2018-07-02 PROCEDURE — G8427 DOCREV CUR MEDS BY ELIG CLIN: HCPCS | Performed by: INTERNAL MEDICINE

## 2018-07-02 PROCEDURE — 1036F TOBACCO NON-USER: CPT | Performed by: INTERNAL MEDICINE

## 2018-07-02 NOTE — PROGRESS NOTES
Systems  General: + fatigue  HEENT: No blurry or decreased vision. Pt wears hearings aids. Cardiovascular:  See HPI. No cramping in legs or buttocks when walking. Respiratory: No cough, no hemoptysis, or wheezing. No history of asthma. Gastrointestinal:  No abdominal pain, hematochezia, melana, constipation, diarrhea. Genito-Urinary: No dysuria or hematuria. Musculoskeletal:  No complaints of joint pain, joint swelling or muscular weakness/soreness. Neurological:  No dizziness, headaches,  Hx TIA  Psychological:  No anxiety or depression. Hematological and Lymphatic: No abnormal bleeding or bruising, blood clots, jaundice or swollen lymph nodes. Endocrine:  No known history of DM or thyroid disease. No malaise/lethargy, palpitations, polydipsia/polyuria, temperature intolerance or unexpected weight changes. Skin:  No rashes or non-healing ulcers. Physical Exam:  Blood pressure 126/78, pulse 74, weight 169 lb (76.7 kg), SpO2 98 %. General (appearance): Well devel. No distress  Eyes: Anicteric  Ears/Nose/Mouth/Thorat: No cyanosis  CV: RRR. Soft CATIA   Respiratory:  Lungs clear B  GI: Abd soft, NT. No distension  Skin: Warm, dry. No rashes  Neuro/Psych: Alert and oriented x 3. Appropriate behavior  Ext:  No c/c.  No edema  Pulses:  No carotid bruits    Lab Results   Component Value Date    WBC 7.8 09/11/2017    HGB 15.4 09/11/2017    HCT 46.7 09/11/2017    MCV 91.0 09/11/2017     09/11/2017       Chemistry        Component Value Date/Time     02/28/2018 0711    K 5.2 (H) 02/28/2018 0711    CL 97 (L) 02/28/2018 0711    CO2 28 02/28/2018 0711    BUN 16 02/28/2018 0711    CREATININE 1.0 02/28/2018 0711        Component Value Date/Time    CALCIUM 9.4 02/28/2018 0711    ALKPHOS 126 02/28/2018 0711    AST 30 02/28/2018 0711    ALT 22 02/28/2018 0711    BILITOT 0.9 02/28/2018 0711        Lab Results   Component Value Date    INR 1.11 01/11/2013    INR 1.2 (H) 07/14/2012    INR 1.3 (H) 07/13/2012 PROTIME 12.6 01/11/2013    PROTIME 15.2 (H) 07/14/2012    PROTIME 16.0 (H) 07/13/2012 4/2018 TTE:   Left ventricular cavity size is normal. Normal left ventricular wall   thickness. Overall left ventricular systolic function appears low normal   with an estimated ejection fraction of 50%. There is abnormal septal motion.   Right ventricle size appears enlarged. Right ventricular systolic function   appears decreased.   Aortic valve leaflets appear thickened, but no stenosis or regurgitation.   Estimated pulmonary artery systolic pressure is at 34 mmHg assuming a right   atrial pressure of 3 mmHg. 1/29/2013 MRI Brain Report:  1. No evidence of recent infarct. 2. Cerebral parenchymal volume loss. 3. Prominent posterior in fluid in the posterior fossa. This could be related to arachnoid cyst and is stable from 5/20/2012.    4/2017 Echo: Normal LVEF. ERIC. Dilated and HK RV. RVSP 38 mm Hg    2012 R/[facility]  Angiographic Findings: Right dominant system  Left Main: Normal  Left Anterior Descending: Minimal mid luminal irregularities  Circumflex: Mild luminal irregularities  Right Coronary: Normal  Left Ventriculogram:  Not performed. Pressures (mm Hg):   Right Atrium: 3/1 (1)   Right Ventricle: 23/0, 2  Pulmonary Artery: 25/3 (13)   Pulmonary Artery Wedge: 9/5 (2)   Cardiac Output and Index: Thermodilution C.O.: 6.0 L/min  Thermodilution C. I.: 3.5 L/min/m2      Current Outpatient Prescriptions:     atorvastatin (LIPITOR) 20 MG tablet, Take 1 tablet by mouth daily, Disp: 90 tablet, Rfl: 1    Flaxseed, Linseed, 1000 MG CAPS, Take 1 capsule by mouth 2 times daily. , Disp: , Rfl:     multivitamin (THERAGRAN) per tablet, Take 1 tablet by mouth daily. , Disp: , Rfl:     aspirin 81 MG tablet, Take 81 mg by mouth daily. , Disp: , Rfl:     Psyllium (METAMUCIL PO), Take 2 Units by mouth daily. 2 TSP., Disp: , Rfl:     Assessment:  76 y.o. with h/o MV and AoV repair in 7/2012.  1. S/P AVR    2.  Essential hypertension

## 2018-07-02 NOTE — Clinical Note
Disappointed that you are leaving. I told him I agree! Good luck though, if I don't see you in the mean time.   Thanks, Gillian Hinson

## 2018-07-13 ENCOUNTER — OFFICE VISIT (OUTPATIENT)
Dept: FAMILY MEDICINE CLINIC | Age: 78
End: 2018-07-13

## 2018-07-13 VITALS
WEIGHT: 168.8 LBS | TEMPERATURE: 97.8 F | DIASTOLIC BLOOD PRESSURE: 74 MMHG | BODY MASS INDEX: 29.9 KG/M2 | SYSTOLIC BLOOD PRESSURE: 110 MMHG

## 2018-07-13 DIAGNOSIS — N13.8 BPH WITH OBSTRUCTION/LOWER URINARY TRACT SYMPTOMS: Primary | ICD-10-CM

## 2018-07-13 DIAGNOSIS — N40.1 BPH WITH OBSTRUCTION/LOWER URINARY TRACT SYMPTOMS: Primary | ICD-10-CM

## 2018-07-13 DIAGNOSIS — E78.2 MIXED HYPERLIPIDEMIA: ICD-10-CM

## 2018-07-13 DIAGNOSIS — I10 ESSENTIAL HYPERTENSION: ICD-10-CM

## 2018-07-13 DIAGNOSIS — G31.84 MILD COGNITIVE IMPAIRMENT: ICD-10-CM

## 2018-07-13 PROCEDURE — 1036F TOBACCO NON-USER: CPT | Performed by: FAMILY MEDICINE

## 2018-07-13 PROCEDURE — 1101F PT FALLS ASSESS-DOCD LE1/YR: CPT | Performed by: FAMILY MEDICINE

## 2018-07-13 PROCEDURE — G8427 DOCREV CUR MEDS BY ELIG CLIN: HCPCS | Performed by: FAMILY MEDICINE

## 2018-07-13 PROCEDURE — G8417 CALC BMI ABV UP PARAM F/U: HCPCS | Performed by: FAMILY MEDICINE

## 2018-07-13 PROCEDURE — 99214 OFFICE O/P EST MOD 30 MIN: CPT | Performed by: FAMILY MEDICINE

## 2018-07-13 PROCEDURE — 1123F ACP DISCUSS/DSCN MKR DOCD: CPT | Performed by: FAMILY MEDICINE

## 2018-07-13 PROCEDURE — 4040F PNEUMOC VAC/ADMIN/RCVD: CPT | Performed by: FAMILY MEDICINE

## 2018-07-13 RX ORDER — ATORVASTATIN CALCIUM 20 MG/1
20 TABLET, FILM COATED ORAL DAILY
Qty: 90 TABLET | Refills: 0 | Status: SHIPPED | OUTPATIENT
Start: 2018-07-13 | End: 2018-11-12 | Stop reason: SDUPTHER

## 2018-07-13 RX ORDER — AMOXICILLIN 500 MG/1
CAPSULE ORAL
Qty: 12 CAPSULE | Refills: 1 | Status: SHIPPED | OUTPATIENT
Start: 2018-07-13 | End: 2018-09-07

## 2018-07-14 ASSESSMENT — ENCOUNTER SYMPTOMS
RESPIRATORY NEGATIVE: 1
EYES NEGATIVE: 1
GASTROINTESTINAL NEGATIVE: 1

## 2018-07-14 NOTE — PROGRESS NOTES
Adacel) 11/17/2000, 02/29/2012    Zoster Subunit (Shingrix) 03/02/2018, 05/18/2018       Allergies   Allergen Reactions    Demerol     Oxycodone Hcl     Flomax [Tamsulosin Hcl]     Meperidine Nausea And Vomiting     Outpatient Prescriptions Marked as Taking for the 7/13/18 encounter (Office Visit) with Robert Gutierrez MD   Medication Sig Dispense Refill    amoxicillin (AMOXIL) 500 MG capsule Take four tablets one hour before the procedure. 12 capsule 1    atorvastatin (LIPITOR) 20 MG tablet Take 1 tablet by mouth daily 90 tablet 0    Flaxseed, Linseed, 1000 MG CAPS Take 1 capsule by mouth 2 times daily.  multivitamin (THERAGRAN) per tablet Take 1 tablet by mouth daily.  aspirin 81 MG tablet Take 81 mg by mouth daily.  Psyllium (METAMUCIL PO) Take 2 Units by mouth daily. 2 TSP. Past Medical History:   Diagnosis Date    Aortic valve disorder     Arthritis     Blood transfusion     BPH (benign prostatic hypertrophy)     Cancer (HCC)     SKIN. 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    Diverticulosis of colon     Essential hypertension 1/17/2013    History of GI bleed 4/2012    Hydronephrosis, left 7/2/2016    Hyperlipidemia     Mitral valve disorder     MSSA (methicillin susceptible Staphylococcus aureus) septicemia ([facility] [address] 3/2012    Osteoarthritis     neck, back, knees    Staph aureus infection 3/10/12    blood    TIA (transient ischemic attack) 5/2012     Past Surgical History:   Procedure Laterality Date    AORTIC VALVE SURGERY      CARDIAC SURGERY  6/2012    angiogram-no blockage    COLONOSCOPY      HERNIA REPAIR      LEFT INGUINAL.     JOINT REPLACEMENT      RIGHT KNEE TOTAL 11/05, left knee 2006    KNEE PROSTHESIS REMOVAL Left     KNEE SURGERY  4/2012    to remove blood clot on right knee    KNEE SURGERY  3/2012    for MSSA infection bilat. knees    MITRAL VALVE SURGERY      OTHER SURGICAL HISTORY  07/03/2016    CYSTOSCOPY, LEFT URETEROSCOPY STONE MANIPULATION WITH LASER,    TOTAL KNEE ARTHROPLASTY      bilat    VASECTOMY       Family History   Problem Relation Age of Onset    Stroke Mother     Stroke Father     Heart Disease Father     Cancer Brother         skin     Social History     Social History    Marital status:      Spouse name: N/A    Number of children: N/A    Years of education: N/A     Occupational History    Not on file. Social History Main Topics    Smoking status: Never Smoker    Smokeless tobacco: Never Used    Alcohol use 2.4 - 3.0 oz/week     2 - 3 Glasses of wine, 2 Cans of beer per week      Comment: drinks every other day    Drug use: No    Sexual activity: Not on file     Other Topics Concern    Not on file     Social History Narrative    No narrative on file         Any recent diagnostic tests, hospital reports, office notes or consultation letters were reviewed prior to and during the visit. Review of Systems   Constitutional: Negative. HENT: Negative. Eyes: Negative. Respiratory: Negative. Cardiovascular: Negative. Gastrointestinal: Negative. Genitourinary: Negative. Musculoskeletal: Negative. Psychiatric/Behavioral: Negative. Physical Exam   Constitutional: He appears well-developed and well-nourished. No distress. Neck: Normal range of motion. Neck supple. Normal carotid pulses, no hepatojugular reflux and no JVD present. Carotid bruit is not present. No tracheal deviation present. No thyromegaly present. Cardiovascular: Normal rate, regular rhythm, S2 normal, normal heart sounds and intact distal pulses. PMI is not displaced. Exam reveals no gallop and no friction rub. No murmur heard. Pulses:       Carotid pulses are 2+ on the right side, and 2+ on the left side. Dorsalis pedis pulses are 2+ on the right side, and 2+ on the left side. Posterior tibial pulses are 2+ on the right side, and 2+ on the left side.    Pulmonary/Chest: Effort complete a self tracking handout on diet and instructed them to bring it with them to the  next appointment. Discussed use, benefit, and side effects of prescribed medications. Barriers to medication compliance addressed. All patient questions answered. Pt voiced understanding. atorvastatin (LIPITOR) 20 MG tablet   4. Mild cognitive impairment Condition stable continue the medications, treatments, will check labs as appropriate         . valente Quigley MD

## 2018-09-07 ENCOUNTER — OFFICE VISIT (OUTPATIENT)
Dept: FAMILY MEDICINE CLINIC | Age: 78
End: 2018-09-07

## 2018-09-07 VITALS
WEIGHT: 171.6 LBS | BODY MASS INDEX: 30.41 KG/M2 | SYSTOLIC BLOOD PRESSURE: 120 MMHG | HEART RATE: 71 BPM | DIASTOLIC BLOOD PRESSURE: 70 MMHG | OXYGEN SATURATION: 97 % | HEIGHT: 63 IN

## 2018-09-07 DIAGNOSIS — G47.33 OSA ON CPAP: ICD-10-CM

## 2018-09-07 DIAGNOSIS — Z99.89 OSA ON CPAP: ICD-10-CM

## 2018-09-07 DIAGNOSIS — Z23 NEED FOR INFLUENZA VACCINATION: ICD-10-CM

## 2018-09-07 DIAGNOSIS — I10 ESSENTIAL HYPERTENSION: ICD-10-CM

## 2018-09-07 DIAGNOSIS — E78.2 MIXED HYPERLIPIDEMIA: Primary | ICD-10-CM

## 2018-09-07 PROCEDURE — G8510 SCR DEP NEG, NO PLAN REQD: HCPCS | Performed by: FAMILY MEDICINE

## 2018-09-07 PROCEDURE — 99214 OFFICE O/P EST MOD 30 MIN: CPT | Performed by: FAMILY MEDICINE

## 2018-09-07 PROCEDURE — G8417 CALC BMI ABV UP PARAM F/U: HCPCS | Performed by: FAMILY MEDICINE

## 2018-09-07 PROCEDURE — 1101F PT FALLS ASSESS-DOCD LE1/YR: CPT | Performed by: FAMILY MEDICINE

## 2018-09-07 PROCEDURE — 90662 IIV NO PRSV INCREASED AG IM: CPT | Performed by: FAMILY MEDICINE

## 2018-09-07 PROCEDURE — 1123F ACP DISCUSS/DSCN MKR DOCD: CPT | Performed by: FAMILY MEDICINE

## 2018-09-07 PROCEDURE — G0008 ADMIN INFLUENZA VIRUS VAC: HCPCS | Performed by: FAMILY MEDICINE

## 2018-09-07 PROCEDURE — 4040F PNEUMOC VAC/ADMIN/RCVD: CPT | Performed by: FAMILY MEDICINE

## 2018-09-07 PROCEDURE — 1036F TOBACCO NON-USER: CPT | Performed by: FAMILY MEDICINE

## 2018-09-07 PROCEDURE — G8427 DOCREV CUR MEDS BY ELIG CLIN: HCPCS | Performed by: FAMILY MEDICINE

## 2018-09-07 ASSESSMENT — PATIENT HEALTH QUESTIONNAIRE - PHQ9
SUM OF ALL RESPONSES TO PHQ QUESTIONS 1-9: 0
SUM OF ALL RESPONSES TO PHQ9 QUESTIONS 1 & 2: 0
SUM OF ALL RESPONSES TO PHQ QUESTIONS 1-9: 0
2. FEELING DOWN, DEPRESSED OR HOPELESS: 0
1. LITTLE INTEREST OR PLEASURE IN DOING THINGS: 0

## 2018-09-07 NOTE — PROGRESS NOTES
Vaccine Information Sheet, \"Influenza - Inactivated\"  given to Bo Menchaca, or parent/legal guardian of  Bo Menchaca and verbalized understanding. Patient responses:    Have you ever had a reaction to a flu vaccine? No  Are you able to eat eggs without adverse effects? Yes  Do you have any current illness? No  Have you ever had Guillian [place] Syndrome? No    Flu vaccine given per order. Please see immunization tab.

## 2018-09-07 NOTE — PATIENT INSTRUCTIONS
Patient Education        Influenza (Flu) Vaccine (Inactivated or Recombinant): What You Need to Know  Why get vaccinated? Influenza (\"flu\") is a contagious disease that spreads around the [place] every winter, usually between October and May. Flu is caused by influenza viruses and is spread mainly by coughing, sneezing, and close contact. Anyone can get flu. Flu strikes suddenly and can last several days. Symptoms vary by age, but can include:  · Fever/chills. · Sore throat. · Muscle aches. · Fatigue. · Cough. · Headache. · Runny or stuffy nose. Flu can also lead to pneumonia and blood infections, and cause diarrhea and seizures in children. If you have a medical condition, such as heart or lung disease, flu can make it worse. Flu is more dangerous for some people. Infants and young children, people 72years of age and older, pregnant women, and people with certain health conditions or a weakened immune system are at greatest risk. Each year thousands of people in the [facility] die from flu, and many more are hospitalized. Flu vaccine can:  · Keep you from getting flu. · Make flu less severe if you do get it. · Keep you from spreading flu to your family and other people. Inactivated and recombinant flu vaccines  A dose of flu vaccine is recommended every flu season. Children 6 months through 6years of age may need two doses during the same flu season. Everyone else needs only one dose each flu season. Some inactivated flu vaccines contain a very small amount of a mercury-based preservative called thimerosal. Studies have not shown thimerosal in vaccines to be harmful, but flu vaccines that do not contain thimerosal are available. There is no live flu virus in flu shots. They cannot cause the flu. There are many flu viruses, and they are always changing.  Each year a new flu vaccine is made to protect against three or four viruses that are likely to cause disease in the upcoming flu 4-843-814-6451. [facility] does not give medical advice. The National Vaccine Injury Compensation Program  The National Vaccine Injury Compensation Program (VICP) is a federal program that was created to compensate people who may have been injured by certain vaccines. Persons who believe they may have been injured by a vaccine can learn about the program and about filing a claim by calling 2-758.392.2040 or visiting the [employer] website at www.[facility].gov/vaccinecompensation. There is a time limit to file a claim for compensation. How can I learn more? · Ask your healthcare provider. He or she can give you the vaccine package insert or suggest other sources of information. · Call your local or state health department. · Contact the Centers for Disease Control and Prevention (CDC):  ¨ Call 6-629.110.1931 (1-800-CDC-INFO) or  ¨ Visit CDC's website at www.cdc.gov/flu  Vaccine Information Statement  Inactivated Influenza Vaccine  8/7/2015)  [address] [place] [address] 453ZR-91  Department of Health and Human Services  Centers for Disease Control and Prevention  Many Vaccine Information Statements are available in [place] and other languages. See www.immunize.org/vis. Muchas hojas de información sobre vacunas están disponibles en español y en otros idiomas. Visite www.immunize.org/vis. Care instructions adapted under license by [facility] ([facility]). If you have questions about a medical condition or this instruction, always ask your healthcare professional. [place] [address] any warranty or liability for your use of this information.

## 2018-09-07 NOTE — PROGRESS NOTES
Subjective:      Patient ID: Casandra Shin is a 76 y.o. male. HPI   Pt is a of 76 y.o. male comes in today with   Chief Complaint   Patient presents with   [address]     PCP retired, establish care     Skin Problem     skin tag on neck      Has been feeling well. Following with cardiology for MV and AoV  Compliant with cpap. MCI has been stable. Very active. Delfin Haddock and does yardwork. Past Medical History:Reviewed  Medications:Reviewed. Allergies   Allergen Reactions    Demerol     Oxycodone Hcl     Flomax [Tamsulosin Hcl]     Meperidine Nausea And Vomiting      Social hx:Reviewed. History   Smoking Status    Never Smoker   Smokeless Tobacco    Never Used         Review of Systems  Vitals:    09/07/18 1328   BP: 120/70   Pulse: 71   SpO2: 97%      Objective:   Physical Exam   Constitutional: He is oriented to person, place, and time. He appears well-developed and well-nourished. HENT:   Head: Normocephalic and atraumatic. Mouth/Throat: No oropharyngeal exudate. Eyes: Conjunctivae are normal.   Neck: Neck supple. Carotid bruit is not present. No thyromegaly present. Cardiovascular: Normal rate, regular rhythm, normal heart sounds and intact distal pulses. No murmur heard. Pulmonary/Chest: Effort normal and breath sounds normal. He has no rales. Musculoskeletal: He exhibits no edema. Lymphadenopathy:     He has no cervical adenopathy. Neurological: He is alert and oriented to person, place, and time. No cranial nerve deficit. Skin: Skin is warm and dry. No cyanosis. Nails show no clubbing. Psychiatric: He has a normal mood and affect. Assessment:       Diagnosis Orders   1. Mixed hyperlipidemia  Lipid Panel    Comprehensive Metabolic Panel   2. Need for influenza vaccination  INFLUENZA, HIGH DOSE, 65 YRS +, IM, PF, PREFILL SYR, 0.5ML (FLUZONE HD)   3. Essential hypertension     4.  SHAINA on CPAP            Plan:     [place] Sylvia was seen today for establish care and skin problem. Diagnoses and all orders for this visit:    Mixed hyperlipidemia  -     Lipid Panel; Future  -     Comprehensive Metabolic Panel; Future  Has been stable on lipitor  Essential hypertension  The current medical regimen is effective;  continue present plan and medications.    SHAINA on CPAP  Stable on cpap  Need for influenza vaccination  -     INFLUENZA, HIGH DOSE, 65 YRS +, IM, PF, PREFILL SYR, 0.5ML (FLUZONE HD)             Polly Guillory MD

## 2018-11-12 DIAGNOSIS — E78.2 MIXED HYPERLIPIDEMIA: ICD-10-CM

## 2018-11-12 RX ORDER — ATORVASTATIN CALCIUM 20 MG/1
TABLET, FILM COATED ORAL
Qty: 90 TABLET | Refills: 0 | Status: SHIPPED | OUTPATIENT
Start: 2018-11-12 | End: 2018-12-07

## 2018-12-07 ENCOUNTER — OFFICE VISIT (OUTPATIENT)
Dept: FAMILY MEDICINE CLINIC | Age: 78
End: 2018-12-07
Payer: MEDICARE

## 2018-12-07 VITALS
WEIGHT: 172.6 LBS | HEIGHT: 63 IN | RESPIRATION RATE: 12 BRPM | DIASTOLIC BLOOD PRESSURE: 80 MMHG | SYSTOLIC BLOOD PRESSURE: 120 MMHG | BODY MASS INDEX: 30.58 KG/M2 | OXYGEN SATURATION: 98 % | HEART RATE: 73 BPM

## 2018-12-07 DIAGNOSIS — Z99.89 OSA ON CPAP: ICD-10-CM

## 2018-12-07 DIAGNOSIS — R73.03 PREDIABETES: ICD-10-CM

## 2018-12-07 DIAGNOSIS — G47.33 OSA ON CPAP: ICD-10-CM

## 2018-12-07 DIAGNOSIS — R73.03 PREDIABETES: Primary | ICD-10-CM

## 2018-12-07 DIAGNOSIS — E78.2 MIXED HYPERLIPIDEMIA: ICD-10-CM

## 2018-12-07 LAB
A/G RATIO: 1.7 (ref 1.1–2.2)
ALBUMIN SERPL-MCNC: 4.5 G/DL (ref 3.4–5)
ALP BLD-CCNC: 137 U/L (ref 40–129)
ALT SERPL-CCNC: 22 U/L (ref 10–40)
ANION GAP SERPL CALCULATED.3IONS-SCNC: 11 MMOL/L (ref 3–16)
AST SERPL-CCNC: 27 U/L (ref 15–37)
BILIRUB SERPL-MCNC: 0.9 MG/DL (ref 0–1)
BUN BLDV-MCNC: 13 MG/DL (ref 7–20)
CALCIUM SERPL-MCNC: 9.9 MG/DL (ref 8.3–10.6)
CHLORIDE BLD-SCNC: 98 MMOL/L (ref 99–110)
CHOLESTEROL, TOTAL: 143 MG/DL (ref 0–199)
CO2: 27 MMOL/L (ref 21–32)
CREAT SERPL-MCNC: 1 MG/DL (ref 0.8–1.3)
GFR AFRICAN AMERICAN: >60
GFR NON-AFRICAN AMERICAN: >60
GLOBULIN: 2.6 G/DL
GLUCOSE BLD-MCNC: 97 MG/DL (ref 70–99)
HDLC SERPL-MCNC: 46 MG/DL (ref 40–60)
LDL CHOLESTEROL CALCULATED: 83 MG/DL
POTASSIUM SERPL-SCNC: 5.5 MMOL/L (ref 3.5–5.1)
SODIUM BLD-SCNC: 136 MMOL/L (ref 136–145)
TOTAL PROTEIN: 7.1 G/DL (ref 6.4–8.2)
TRIGL SERPL-MCNC: 69 MG/DL (ref 0–150)
VLDLC SERPL CALC-MCNC: 14 MG/DL

## 2018-12-07 PROCEDURE — 4040F PNEUMOC VAC/ADMIN/RCVD: CPT | Performed by: FAMILY MEDICINE

## 2018-12-07 PROCEDURE — 99214 OFFICE O/P EST MOD 30 MIN: CPT | Performed by: FAMILY MEDICINE

## 2018-12-07 PROCEDURE — G8417 CALC BMI ABV UP PARAM F/U: HCPCS | Performed by: FAMILY MEDICINE

## 2018-12-07 PROCEDURE — 1123F ACP DISCUSS/DSCN MKR DOCD: CPT | Performed by: FAMILY MEDICINE

## 2018-12-07 PROCEDURE — 1101F PT FALLS ASSESS-DOCD LE1/YR: CPT | Performed by: FAMILY MEDICINE

## 2018-12-07 PROCEDURE — G8427 DOCREV CUR MEDS BY ELIG CLIN: HCPCS | Performed by: FAMILY MEDICINE

## 2018-12-07 PROCEDURE — 1036F TOBACCO NON-USER: CPT | Performed by: FAMILY MEDICINE

## 2018-12-07 PROCEDURE — G8482 FLU IMMUNIZE ORDER/ADMIN: HCPCS | Performed by: FAMILY MEDICINE

## 2018-12-07 RX ORDER — ROSUVASTATIN CALCIUM 10 MG/1
10 TABLET, COATED ORAL NIGHTLY
Qty: 90 TABLET | Refills: 1 | Status: SHIPPED | OUTPATIENT
Start: 2018-12-07 | End: 2019-01-09

## 2018-12-07 ASSESSMENT — ENCOUNTER SYMPTOMS: RESPIRATORY NEGATIVE: 1

## 2018-12-08 LAB
ESTIMATED AVERAGE GLUCOSE: 128.4 MG/DL
HBA1C MFR BLD: 6.1 %

## 2019-01-07 ENCOUNTER — OFFICE VISIT (OUTPATIENT)
Dept: CARDIOLOGY CLINIC | Age: 79
End: 2019-01-07
Payer: MEDICARE

## 2019-01-07 VITALS
SYSTOLIC BLOOD PRESSURE: 130 MMHG | DIASTOLIC BLOOD PRESSURE: 84 MMHG | WEIGHT: 176 LBS | HEART RATE: 77 BPM | BODY MASS INDEX: 31.18 KG/M2 | OXYGEN SATURATION: 99 %

## 2019-01-07 DIAGNOSIS — Z98.890 S/P MVR (MITRAL VALVE REPAIR): ICD-10-CM

## 2019-01-07 DIAGNOSIS — R94.31 ABNORMAL ECG: ICD-10-CM

## 2019-01-07 DIAGNOSIS — G45.9 TIA (TRANSIENT ISCHEMIC ATTACK): ICD-10-CM

## 2019-01-07 DIAGNOSIS — Z98.890 S/P AORTIC VALVE REPAIR: ICD-10-CM

## 2019-01-07 DIAGNOSIS — Z01.818 PREOP EXAM FOR INTERNAL MEDICINE: Primary | ICD-10-CM

## 2019-01-07 DIAGNOSIS — I05.9 MITRAL VALVE DISEASE: ICD-10-CM

## 2019-01-07 PROCEDURE — 1101F PT FALLS ASSESS-DOCD LE1/YR: CPT | Performed by: INTERNAL MEDICINE

## 2019-01-07 PROCEDURE — G8417 CALC BMI ABV UP PARAM F/U: HCPCS | Performed by: INTERNAL MEDICINE

## 2019-01-07 PROCEDURE — 99214 OFFICE O/P EST MOD 30 MIN: CPT | Performed by: INTERNAL MEDICINE

## 2019-01-07 PROCEDURE — G8482 FLU IMMUNIZE ORDER/ADMIN: HCPCS | Performed by: INTERNAL MEDICINE

## 2019-01-07 PROCEDURE — 4040F PNEUMOC VAC/ADMIN/RCVD: CPT | Performed by: INTERNAL MEDICINE

## 2019-01-07 PROCEDURE — G8427 DOCREV CUR MEDS BY ELIG CLIN: HCPCS | Performed by: INTERNAL MEDICINE

## 2019-01-07 PROCEDURE — 1123F ACP DISCUSS/DSCN MKR DOCD: CPT | Performed by: INTERNAL MEDICINE

## 2019-01-07 PROCEDURE — 1036F TOBACCO NON-USER: CPT | Performed by: INTERNAL MEDICINE

## 2019-01-07 PROCEDURE — 93000 ELECTROCARDIOGRAM COMPLETE: CPT | Performed by: INTERNAL MEDICINE

## 2019-01-07 RX ORDER — ATORVASTATIN CALCIUM 20 MG/1
20 TABLET, FILM COATED ORAL DAILY
COMMUNITY
End: 2019-03-19

## 2019-01-09 ENCOUNTER — HOSPITAL ENCOUNTER (OUTPATIENT)
Dept: NON INVASIVE DIAGNOSTICS | Age: 79
Discharge: HOME | End: 2019-01-09
Payer: MEDICARE

## 2019-01-09 ENCOUNTER — HOSPITAL ENCOUNTER (OUTPATIENT)
Dept: NUCLEAR MEDICINE | Age: 79
Discharge: HOME | End: 2019-01-09
Payer: MEDICARE

## 2019-01-09 ENCOUNTER — OFFICE VISIT (OUTPATIENT)
Dept: FAMILY MEDICINE CLINIC | Age: 79
End: 2019-01-09
Payer: MEDICARE

## 2019-01-09 VITALS
DIASTOLIC BLOOD PRESSURE: 82 MMHG | HEIGHT: 63 IN | BODY MASS INDEX: 31.18 KG/M2 | HEART RATE: 74 BPM | OXYGEN SATURATION: 98 % | SYSTOLIC BLOOD PRESSURE: 138 MMHG

## 2019-01-09 DIAGNOSIS — E78.2 MIXED HYPERLIPIDEMIA: ICD-10-CM

## 2019-01-09 DIAGNOSIS — Z99.89 OSA ON CPAP: ICD-10-CM

## 2019-01-09 DIAGNOSIS — Z98.890 S/P AORTIC VALVE REPAIR: ICD-10-CM

## 2019-01-09 DIAGNOSIS — G31.84 MILD COGNITIVE IMPAIRMENT: ICD-10-CM

## 2019-01-09 DIAGNOSIS — Z01.818 PREOP EXAMINATION: Primary | ICD-10-CM

## 2019-01-09 DIAGNOSIS — G47.33 OSA ON CPAP: ICD-10-CM

## 2019-01-09 DIAGNOSIS — Z98.890 S/P MVR (MITRAL VALVE REPAIR): ICD-10-CM

## 2019-01-09 DIAGNOSIS — Z01.818 PREOP EXAM FOR INTERNAL MEDICINE: ICD-10-CM

## 2019-01-09 DIAGNOSIS — R94.31 ABNORMAL ECG: ICD-10-CM

## 2019-01-09 LAB
LV EF: 50 %
LV EF: 86 %
LVEF MODALITY: NORMAL
LVEF MODALITY: NORMAL

## 2019-01-09 PROCEDURE — 78452 HT MUSCLE IMAGE SPECT MULT: CPT

## 2019-01-09 PROCEDURE — 1036F TOBACCO NON-USER: CPT | Performed by: FAMILY MEDICINE

## 2019-01-09 PROCEDURE — G8482 FLU IMMUNIZE ORDER/ADMIN: HCPCS | Performed by: FAMILY MEDICINE

## 2019-01-09 PROCEDURE — 99214 OFFICE O/P EST MOD 30 MIN: CPT | Performed by: FAMILY MEDICINE

## 2019-01-09 PROCEDURE — 3430000000 HC RX DIAGNOSTIC RADIOPHARMACEUTICAL: Performed by: INTERNAL MEDICINE

## 2019-01-09 PROCEDURE — A9502 TC99M TETROFOSMIN: HCPCS | Performed by: INTERNAL MEDICINE

## 2019-01-09 PROCEDURE — 2580000003 HC RX 258: Performed by: INTERNAL MEDICINE

## 2019-01-09 PROCEDURE — 6360000002 HC RX W HCPCS: Performed by: INTERNAL MEDICINE

## 2019-01-09 PROCEDURE — 93306 TTE W/DOPPLER COMPLETE: CPT

## 2019-01-09 PROCEDURE — 93017 CV STRESS TEST TRACING ONLY: CPT

## 2019-01-09 PROCEDURE — 1101F PT FALLS ASSESS-DOCD LE1/YR: CPT | Performed by: FAMILY MEDICINE

## 2019-01-09 PROCEDURE — G8417 CALC BMI ABV UP PARAM F/U: HCPCS | Performed by: FAMILY MEDICINE

## 2019-01-09 PROCEDURE — G8427 DOCREV CUR MEDS BY ELIG CLIN: HCPCS | Performed by: FAMILY MEDICINE

## 2019-01-09 PROCEDURE — 1123F ACP DISCUSS/DSCN MKR DOCD: CPT | Performed by: FAMILY MEDICINE

## 2019-01-09 PROCEDURE — 4040F PNEUMOC VAC/ADMIN/RCVD: CPT | Performed by: FAMILY MEDICINE

## 2019-01-09 RX ORDER — SODIUM CHLORIDE 0.9 % (FLUSH) 0.9 %
10 SYRINGE (ML) INJECTION PRN
Status: DISCONTINUED | OUTPATIENT
Start: 2019-01-09 | End: 2019-01-10 | Stop reason: HOSPADM

## 2019-01-09 RX ADMIN — Medication 10 ML: at 08:44

## 2019-01-09 RX ADMIN — TETROFOSMIN 10 MILLICURIE: 1.38 INJECTION, POWDER, LYOPHILIZED, FOR SOLUTION INTRAVENOUS at 08:44

## 2019-01-09 RX ADMIN — REGADENOSON 0.4 MG: 0.08 INJECTION, SOLUTION INTRAVENOUS at 10:33

## 2019-01-09 RX ADMIN — Medication 10 ML: at 10:33

## 2019-01-09 RX ADMIN — TETROFOSMIN 30 MILLICURIE: 1.38 INJECTION, POWDER, LYOPHILIZED, FOR SOLUTION INTRAVENOUS at 10:33

## 2019-01-09 ASSESSMENT — ENCOUNTER SYMPTOMS: RESPIRATORY NEGATIVE: 1

## 2019-01-10 ENCOUNTER — TELEPHONE (OUTPATIENT)
Dept: CARDIOLOGY CLINIC | Age: 79
End: 2019-01-10

## 2019-01-11 ENCOUNTER — TELEPHONE (OUTPATIENT)
Dept: FAMILY MEDICINE CLINIC | Age: 79
End: 2019-01-11

## 2019-03-01 DIAGNOSIS — R73.03 PREDIABETES: ICD-10-CM

## 2019-03-01 DIAGNOSIS — E78.2 MIXED HYPERLIPIDEMIA: ICD-10-CM

## 2019-03-01 DIAGNOSIS — E78.2 MIXED HYPERLIPIDEMIA: Primary | ICD-10-CM

## 2019-03-01 LAB
A/G RATIO: 1.5 (ref 1.1–2.2)
ALBUMIN SERPL-MCNC: 4.1 G/DL (ref 3.4–5)
ALP BLD-CCNC: 154 U/L (ref 40–129)
ALT SERPL-CCNC: 24 U/L (ref 10–40)
ANION GAP SERPL CALCULATED.3IONS-SCNC: 12 MMOL/L (ref 3–16)
AST SERPL-CCNC: 28 U/L (ref 15–37)
BILIRUB SERPL-MCNC: 1.1 MG/DL (ref 0–1)
BUN BLDV-MCNC: 16 MG/DL (ref 7–20)
CALCIUM SERPL-MCNC: 9.6 MG/DL (ref 8.3–10.6)
CHLORIDE BLD-SCNC: 95 MMOL/L (ref 99–110)
CHOLESTEROL, TOTAL: 129 MG/DL (ref 0–199)
CO2: 26 MMOL/L (ref 21–32)
CREAT SERPL-MCNC: 1 MG/DL (ref 0.8–1.3)
GFR AFRICAN AMERICAN: >60
GFR NON-AFRICAN AMERICAN: >60
GLOBULIN: 2.7 G/DL
GLUCOSE BLD-MCNC: 101 MG/DL (ref 70–99)
HDLC SERPL-MCNC: 45 MG/DL (ref 40–60)
LDL CHOLESTEROL CALCULATED: 72 MG/DL
POTASSIUM SERPL-SCNC: 5.5 MMOL/L (ref 3.5–5.1)
SODIUM BLD-SCNC: 133 MMOL/L (ref 136–145)
TOTAL PROTEIN: 6.8 G/DL (ref 6.4–8.2)
TRIGL SERPL-MCNC: 60 MG/DL (ref 0–150)
VLDLC SERPL CALC-MCNC: 12 MG/DL

## 2019-03-02 LAB
ESTIMATED AVERAGE GLUCOSE: 119.8 MG/DL
HBA1C MFR BLD: 5.8 %

## 2019-03-19 ENCOUNTER — TELEPHONE (OUTPATIENT)
Dept: FAMILY MEDICINE CLINIC | Age: 79
End: 2019-03-19

## 2019-03-19 ENCOUNTER — OFFICE VISIT (OUTPATIENT)
Dept: FAMILY MEDICINE CLINIC | Age: 79
End: 2019-03-19
Payer: MEDICARE

## 2019-03-19 VITALS
WEIGHT: 178.2 LBS | HEIGHT: 63 IN | HEART RATE: 84 BPM | OXYGEN SATURATION: 97 % | DIASTOLIC BLOOD PRESSURE: 84 MMHG | SYSTOLIC BLOOD PRESSURE: 132 MMHG | BODY MASS INDEX: 31.57 KG/M2

## 2019-03-19 DIAGNOSIS — E78.2 MIXED HYPERLIPIDEMIA: ICD-10-CM

## 2019-03-19 DIAGNOSIS — R73.9 ELEVATED BLOOD SUGAR: ICD-10-CM

## 2019-03-19 DIAGNOSIS — E78.2 MIXED HYPERLIPIDEMIA: Primary | ICD-10-CM

## 2019-03-19 DIAGNOSIS — G31.84 MILD COGNITIVE IMPAIRMENT: Primary | ICD-10-CM

## 2019-03-19 PROCEDURE — G8417 CALC BMI ABV UP PARAM F/U: HCPCS | Performed by: FAMILY MEDICINE

## 2019-03-19 PROCEDURE — 1123F ACP DISCUSS/DSCN MKR DOCD: CPT | Performed by: FAMILY MEDICINE

## 2019-03-19 PROCEDURE — G8427 DOCREV CUR MEDS BY ELIG CLIN: HCPCS | Performed by: FAMILY MEDICINE

## 2019-03-19 PROCEDURE — 1036F TOBACCO NON-USER: CPT | Performed by: FAMILY MEDICINE

## 2019-03-19 PROCEDURE — 99213 OFFICE O/P EST LOW 20 MIN: CPT | Performed by: FAMILY MEDICINE

## 2019-03-19 PROCEDURE — G8482 FLU IMMUNIZE ORDER/ADMIN: HCPCS | Performed by: FAMILY MEDICINE

## 2019-03-19 PROCEDURE — 4040F PNEUMOC VAC/ADMIN/RCVD: CPT | Performed by: FAMILY MEDICINE

## 2019-03-19 RX ORDER — ROSUVASTATIN CALCIUM 10 MG/1
10 TABLET, COATED ORAL DAILY
COMMUNITY
Start: 2019-03-18 | End: 2019-09-27 | Stop reason: SDUPTHER

## 2019-03-19 ASSESSMENT — PATIENT HEALTH QUESTIONNAIRE - PHQ9
1. LITTLE INTEREST OR PLEASURE IN DOING THINGS: 0
2. FEELING DOWN, DEPRESSED OR HOPELESS: 0
SUM OF ALL RESPONSES TO PHQ9 QUESTIONS 1 & 2: 0
SUM OF ALL RESPONSES TO PHQ QUESTIONS 1-9: 0
SUM OF ALL RESPONSES TO PHQ QUESTIONS 1-9: 0

## 2019-06-12 ENCOUNTER — APPOINTMENT (OUTPATIENT)
Dept: GENERAL RADIOLOGY | Age: 79
End: 2019-06-12
Payer: MEDICARE

## 2019-06-12 ENCOUNTER — HOSPITAL ENCOUNTER (EMERGENCY)
Age: 79
Discharge: HOME | End: 2019-06-12
Attending: EMERGENCY MEDICINE
Payer: MEDICARE

## 2019-06-12 ENCOUNTER — TELEPHONE (OUTPATIENT)
Dept: FAMILY MEDICINE CLINIC | Age: 79
End: 2019-06-12

## 2019-06-12 VITALS
RESPIRATION RATE: 16 BRPM | TEMPERATURE: 98.7 F | OXYGEN SATURATION: 95 % | HEIGHT: 64 IN | BODY MASS INDEX: 29.02 KG/M2 | HEART RATE: 94 BPM | SYSTOLIC BLOOD PRESSURE: 136 MMHG | WEIGHT: 170 LBS | DIASTOLIC BLOOD PRESSURE: 92 MMHG

## 2019-06-12 DIAGNOSIS — R79.89 POSITIVE D DIMER: Primary | ICD-10-CM

## 2019-06-12 DIAGNOSIS — M79.604 RIGHT LEG PAIN: ICD-10-CM

## 2019-06-12 LAB
ANION GAP SERPL CALCULATED.3IONS-SCNC: 13 MMOL/L (ref 3–16)
BASOPHILS ABSOLUTE: 0.1 K/UL (ref 0–0.2)
BASOPHILS RELATIVE PERCENT: 1.1 %
BUN BLDV-MCNC: 12 MG/DL (ref 7–20)
C-REACTIVE PROTEIN: 3.6 MG/L (ref 0–5.1)
CALCIUM SERPL-MCNC: 9.3 MG/DL (ref 8.3–10.6)
CHLORIDE BLD-SCNC: 95 MMOL/L (ref 99–110)
CO2: 22 MMOL/L (ref 21–32)
CREAT SERPL-MCNC: 0.8 MG/DL (ref 0.8–1.3)
D DIMER: 789 NG/ML DDU (ref 0–229)
EOSINOPHILS ABSOLUTE: 0.1 K/UL (ref 0–0.6)
EOSINOPHILS RELATIVE PERCENT: 1.6 %
GFR AFRICAN AMERICAN: >60
GFR NON-AFRICAN AMERICAN: >60
GLUCOSE BLD-MCNC: 88 MG/DL (ref 70–99)
HCT VFR BLD CALC: 45.4 % (ref 40.5–52.5)
HEMOGLOBIN: 15 G/DL (ref 13.5–17.5)
LYMPHOCYTES ABSOLUTE: 1.3 K/UL (ref 1–5.1)
LYMPHOCYTES RELATIVE PERCENT: 15.3 %
MCH RBC QN AUTO: 28.8 PG (ref 26–34)
MCHC RBC AUTO-ENTMCNC: 33 G/DL (ref 31–36)
MCV RBC AUTO: 87.3 FL (ref 80–100)
MONOCYTES ABSOLUTE: 1.1 K/UL (ref 0–1.3)
MONOCYTES RELATIVE PERCENT: 12.1 %
NEUTROPHILS ABSOLUTE: 6.1 K/UL (ref 1.7–7.7)
NEUTROPHILS RELATIVE PERCENT: 69.9 %
PDW BLD-RTO: 15.7 % (ref 12.4–15.4)
PLATELET # BLD: 342 K/UL (ref 135–450)
PMV BLD AUTO: 7.3 FL (ref 5–10.5)
POTASSIUM REFLEX MAGNESIUM: 4.6 MMOL/L (ref 3.5–5.1)
RBC # BLD: 5.2 M/UL (ref 4.2–5.9)
SEDIMENTATION RATE, ERYTHROCYTE: 7 MM/HR (ref 0–20)
SODIUM BLD-SCNC: 130 MMOL/L (ref 136–145)
WBC # BLD: 8.7 K/UL (ref 4–11)

## 2019-06-12 PROCEDURE — 86140 C-REACTIVE PROTEIN: CPT

## 2019-06-12 PROCEDURE — 80048 BASIC METABOLIC PNL TOTAL CA: CPT

## 2019-06-12 PROCEDURE — 85652 RBC SED RATE AUTOMATED: CPT

## 2019-06-12 PROCEDURE — 73600 X-RAY EXAM OF ANKLE: CPT

## 2019-06-12 PROCEDURE — 6360000002 HC RX W HCPCS: Performed by: PHYSICIAN ASSISTANT

## 2019-06-12 PROCEDURE — 85379 FIBRIN DEGRADATION QUANT: CPT

## 2019-06-12 PROCEDURE — 85025 COMPLETE CBC W/AUTO DIFF WBC: CPT

## 2019-06-12 PROCEDURE — 96372 THER/PROPH/DIAG INJ SC/IM: CPT

## 2019-06-12 PROCEDURE — 73590 X-RAY EXAM OF LOWER LEG: CPT

## 2019-06-12 PROCEDURE — 99284 EMERGENCY DEPT VISIT MOD MDM: CPT

## 2019-06-12 RX ADMIN — ENOXAPARIN SODIUM 120 MG: 120 INJECTION SUBCUTANEOUS at 19:18

## 2019-06-12 ASSESSMENT — ENCOUNTER SYMPTOMS
ABDOMINAL PAIN: 0
BACK PAIN: 0
DIARRHEA: 0
EYE PAIN: 0
NAUSEA: 0
SHORTNESS OF BREATH: 0
CHEST TIGHTNESS: 0
SORE THROAT: 0
VOMITING: 0

## 2019-06-12 ASSESSMENT — PAIN SCALES - GENERAL: PAINLEVEL_OUTOF10: 1

## 2019-06-12 ASSESSMENT — PAIN DESCRIPTION - PAIN TYPE: TYPE: ACUTE PAIN

## 2019-06-12 ASSESSMENT — PAIN DESCRIPTION - ORIENTATION: ORIENTATION: RIGHT;LOWER

## 2019-06-12 ASSESSMENT — PAIN DESCRIPTION - LOCATION: LOCATION: LEG

## 2019-06-12 NOTE — ED NOTES
Patient prepared for and ready to be discharged. Dressed in clothes and given belongings. IV removed, pt tolerated well, no complications. Patient discharged at this time in no acute distress after Patient verbalized understanding of discharge instructions. Reviewed medications, and when to return to the ED with patient. Encouraged follow up with PCP  Patient walked to lobby, Family to take patient home.        Reuben Huitron RN  06/12/19 [id number]

## 2019-06-12 NOTE — ED PROVIDER NOTES
[facility]          PHYSICIAN ASSISTANT NOTE       Date of evaluation: 6/12/2019    Chief Complaint     Leg Pain (Right lower anterior leg pain with swelling since last night)      History of Present Illness     Job Unger is a [de-identified] y.o. male who presents right leg pain. Patient states he woke up early this morning to a cramping sensation in his right lower extremity. He states that the pain has been constant, however has been improving. He states that initially the pain was a 10/10, however at rest is now a 1/10. He states it increases to a 5/10 with any ambulation. He also endorses swelling to his right ankle that began this morning as well. He denies any trauma or injury to his right leg or ankle. He continues to have full range of motion of his right ankle and right knee without any difficulty. He did notice a small bump to the lateral portion of his right shin this morning as well. He contacted his primary care provider who referred him to the emergency department for further evaluation. Patient denies any recent surgeries, immobilization, cancer, or history of DVT/PE. He denies any chest pain or shortness of breath. He denies any numbness or tingling. He denies any fevers, abdominal pain, nausea, vomiting, or any other symptoms at this time. Review of Systems     Review of Systems   Constitutional: Negative for activity change, fatigue and fever. HENT: Negative for congestion and sore throat. Eyes: Negative for pain. Respiratory: Negative for chest tightness and shortness of breath. Cardiovascular: Negative for chest pain and palpitations. Gastrointestinal: Negative for abdominal pain, diarrhea, nausea and vomiting. Genitourinary: Negative for difficulty urinating, dysuria and hematuria. Musculoskeletal: Positive for joint swelling (right ankle) and myalgias (pain to anterior portion of right leg).  Negative for back pain, gait problem and neck stiffness. Skin: Negative for wound. Neurological: Negative for dizziness, syncope, light-headedness, numbness and headaches. Psychiatric/Behavioral: Negative for suicidal ideas. 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 He has a past surgical history that includes hernia repair; Vasectomy; joint replacement; knee surgery (4/2012); Colonoscopy; Cardiac surgery (6/2012); knee surgery (3/2012); Aortic valve surgery; Mitral valve surgery; Total knee arthroplasty; Knee Prosthesis Removal (Left); and other surgical history (07/03/2016). His family history includes Cancer in his brother; Heart Disease in his father; Stroke in his father and mother. He reports that he has never smoked. He has never used smokeless tobacco. He reports that he drinks about 2.4 - 3.0 oz of alcohol per week. He reports that he does not use drugs. Medications     Previous Medications    ASPIRIN 81 MG TABLET    Take 81 mg by mouth daily    FLAXSEED, LINSEED, 1000 MG CAPS    Take 1 capsule by mouth 2 times daily. MULTIVITAMIN (THERAGRAN) PER TABLET    Take 1 tablet by mouth daily. PSYLLIUM (METAMUCIL PO)    Take 2 Units by mouth daily. 2 TSP. ROSUVASTATIN (CRESTOR) 10 MG TABLET    Take 10 mg by mouth daily       Allergies     He is allergic to demerol; oxycodone hcl; flomax [tamsulosin hcl]; and meperidine.     Physical Exam     INITIAL VITALS: BP: (!) 156/94, Temp: 98.7 °F (37.1 °C), Pulse: 69, also has a small area of swelling to the lateral portion of his anterior tibialis muscle. There is no bony tenderness or palpation. Given the patient's the onset of swelling, I did obtain x-ray of the right ankle and tib-fib. Both x-rays came back showing no acute osseous abnormalities. CBC, BMP, CRP, sedimentation rate, and d-dimer were obtained. CBC came back within normal limits. BMP came back at patient's baseline. CRP and sedimentation rate came back normal. D-dimer did come back elevated to 789. Given the patient's full range of motion, negative CRP and sedimentation rate, and lack of significant pain I have low suspicion for septic arthritis causing the patient's symptoms at this time. I have low suspicion for arterial clot given the patient has intact distal pulses. I do have a suspicion for deep venous thrombosis given the patient had an elevated d-dimer in the emergency department today. I have low suspicion for pulmonary embolism given normal oxygen level, normal heart rate, and lack of symptoms. He will be given an injection of Lovenox 1.5 mg/kg for coverage for the next 24 hours. He will also be given a prescription to obtain a right lower extremity ultrasound to obtain tomorrow morning. He was given the appropriate instructions for follow-up tomorrow. He was given strict return precautions including shortness of breath, chest pain, or any other respiratory symptoms. He was also told to follow-up with his primary care provider as an outpatient. Plan was thoroughly discussed with the patient and his family and they're agreeable at this time. This patient was also evaluated by the attending physician. All care plans were discussed and agreed upon. Clinical Impression     1. Positive D dimer    2.  Right leg pain        Disposition     PATIENT REFERRED TO:  Jessica Woo MD  [address] [facility] [address]  [phone]    Schedule an appointment as soon as possible for a visit [facility] Emergency Department  [address] [address]  [phone]  Go to   If symptoms worsen      DISCHARGE MEDICATIONS:  New Prescriptions    No medications on file       DISPOSITION     Discharge     Cony Rolle [place]  06/12/19 [id number]

## 2019-06-12 NOTE — TELEPHONE ENCOUNTER
Call Center called to see if someone could contact the pt's wife regarding questions regarding the pt's blood clot.

## 2019-06-12 NOTE — TELEPHONE ENCOUNTER
Pt woke up with leg cramp in the middle of the night, right side of out side of calf between knee and ankle, soft mass, pain has not subsided    cb [phone]   pt takes baby aspirin

## 2019-06-13 ENCOUNTER — HOSPITAL ENCOUNTER (OUTPATIENT)
Dept: VASCULAR LAB | Age: 79
Discharge: HOME | End: 2019-06-13
Payer: MEDICARE

## 2019-06-13 ENCOUNTER — TELEPHONE (OUTPATIENT)
Dept: FAMILY MEDICINE CLINIC | Age: 79
End: 2019-06-13

## 2019-06-13 DIAGNOSIS — M79.604 RIGHT LEG PAIN: ICD-10-CM

## 2019-06-13 PROCEDURE — 93971 EXTREMITY STUDY: CPT

## 2019-06-13 NOTE — TELEPHONE ENCOUNTER
Should have result right away.  I think harish't today to review and decide on whether or not anticoagulation needs to continue is most appropriate

## 2019-06-13 NOTE — TELEPHONE ENCOUNTER
He is scheduled to have US at 11:00 and has appt with Kaitlyn Ly at  2:00 today.  Do you want appt cancelled today and scheduled with you tomorrow

## 2019-06-13 NOTE — TELEPHONE ENCOUNTER
Pt had a blood clot and  sent the pt to the hospital, and the pt was sent over to have an x-ray due to the blood clot pt stated  want pt to come in 6/14/19 and no appts are available.  Please advise

## 2019-06-14 ENCOUNTER — OFFICE VISIT (OUTPATIENT)
Dept: FAMILY MEDICINE CLINIC | Age: 79
End: 2019-06-14
Payer: MEDICARE

## 2019-06-14 VITALS
HEIGHT: 64 IN | WEIGHT: 176 LBS | HEART RATE: 78 BPM | DIASTOLIC BLOOD PRESSURE: 68 MMHG | OXYGEN SATURATION: 98 % | SYSTOLIC BLOOD PRESSURE: 126 MMHG | BODY MASS INDEX: 30.05 KG/M2

## 2019-06-14 DIAGNOSIS — M25.471 RIGHT ANKLE SWELLING: ICD-10-CM

## 2019-06-14 DIAGNOSIS — R79.89 POSITIVE D-DIMER: ICD-10-CM

## 2019-06-14 DIAGNOSIS — R60.0 EDEMA OF RIGHT LOWER EXTREMITY: Primary | ICD-10-CM

## 2019-06-14 PROCEDURE — G8427 DOCREV CUR MEDS BY ELIG CLIN: HCPCS | Performed by: NURSE PRACTITIONER

## 2019-06-14 PROCEDURE — 99214 OFFICE O/P EST MOD 30 MIN: CPT | Performed by: NURSE PRACTITIONER

## 2019-06-14 PROCEDURE — 1123F ACP DISCUSS/DSCN MKR DOCD: CPT | Performed by: NURSE PRACTITIONER

## 2019-06-14 PROCEDURE — 1036F TOBACCO NON-USER: CPT | Performed by: NURSE PRACTITIONER

## 2019-06-14 PROCEDURE — G8417 CALC BMI ABV UP PARAM F/U: HCPCS | Performed by: NURSE PRACTITIONER

## 2019-06-14 PROCEDURE — 4040F PNEUMOC VAC/ADMIN/RCVD: CPT | Performed by: NURSE PRACTITIONER

## 2019-06-14 RX ORDER — CEPHALEXIN 500 MG/1
500 CAPSULE ORAL 3 TIMES DAILY
Qty: 21 CAPSULE | Refills: 0 | Status: SHIPPED | OUTPATIENT
Start: 2019-06-14 | End: 2019-06-21 | Stop reason: ALTCHOICE

## 2019-06-14 ASSESSMENT — ENCOUNTER SYMPTOMS: SHORTNESS OF BREATH: 0

## 2019-06-21 ENCOUNTER — OFFICE VISIT (OUTPATIENT)
Dept: FAMILY MEDICINE CLINIC | Age: 79
End: 2019-06-21
Payer: MEDICARE

## 2019-06-21 VITALS
DIASTOLIC BLOOD PRESSURE: 74 MMHG | SYSTOLIC BLOOD PRESSURE: 104 MMHG | WEIGHT: 170.6 LBS | OXYGEN SATURATION: 99 % | HEIGHT: 64 IN | HEART RATE: 73 BPM | RESPIRATION RATE: 12 BRPM | BODY MASS INDEX: 29.12 KG/M2

## 2019-06-21 DIAGNOSIS — E78.2 MIXED HYPERLIPIDEMIA: ICD-10-CM

## 2019-06-21 DIAGNOSIS — R35.0 URINE FREQUENCY: Primary | ICD-10-CM

## 2019-06-21 DIAGNOSIS — R35.0 URINE FREQUENCY: ICD-10-CM

## 2019-06-21 LAB
A/G RATIO: 1.5 (ref 1.1–2.2)
ALBUMIN SERPL-MCNC: 4.6 G/DL (ref 3.4–5)
ALP BLD-CCNC: 128 U/L (ref 40–129)
ALT SERPL-CCNC: 27 U/L (ref 10–40)
ANION GAP SERPL CALCULATED.3IONS-SCNC: 13 MMOL/L (ref 3–16)
AST SERPL-CCNC: 39 U/L (ref 15–37)
BILIRUB SERPL-MCNC: 1 MG/DL (ref 0–1)
BUN BLDV-MCNC: 20 MG/DL (ref 7–20)
CALCIUM SERPL-MCNC: 10.1 MG/DL (ref 8.3–10.6)
CHLORIDE BLD-SCNC: 96 MMOL/L (ref 99–110)
CHOLESTEROL, TOTAL: 162 MG/DL (ref 0–199)
CO2: 27 MMOL/L (ref 21–32)
CREAT SERPL-MCNC: 1.1 MG/DL (ref 0.8–1.3)
GFR AFRICAN AMERICAN: >60
GFR NON-AFRICAN AMERICAN: >60
GLOBULIN: 3.1 G/DL
GLUCOSE BLD-MCNC: 105 MG/DL (ref 70–99)
HDLC SERPL-MCNC: 57 MG/DL (ref 40–60)
LDL CHOLESTEROL CALCULATED: 91 MG/DL
POTASSIUM SERPL-SCNC: 5.3 MMOL/L (ref 3.5–5.1)
PROSTATE SPECIFIC ANTIGEN: 3.31 NG/ML (ref 0–4)
SODIUM BLD-SCNC: 136 MMOL/L (ref 136–145)
TOTAL PROTEIN: 7.7 G/DL (ref 6.4–8.2)
TRIGL SERPL-MCNC: 72 MG/DL (ref 0–150)
VLDLC SERPL CALC-MCNC: 14 MG/DL

## 2019-06-21 PROCEDURE — 1036F TOBACCO NON-USER: CPT | Performed by: FAMILY MEDICINE

## 2019-06-21 PROCEDURE — 4040F PNEUMOC VAC/ADMIN/RCVD: CPT | Performed by: FAMILY MEDICINE

## 2019-06-21 PROCEDURE — 99214 OFFICE O/P EST MOD 30 MIN: CPT | Performed by: FAMILY MEDICINE

## 2019-06-21 PROCEDURE — 1123F ACP DISCUSS/DSCN MKR DOCD: CPT | Performed by: FAMILY MEDICINE

## 2019-06-21 PROCEDURE — G8417 CALC BMI ABV UP PARAM F/U: HCPCS | Performed by: FAMILY MEDICINE

## 2019-06-21 PROCEDURE — G8427 DOCREV CUR MEDS BY ELIG CLIN: HCPCS | Performed by: FAMILY MEDICINE

## 2019-06-21 RX ORDER — TAMSULOSIN HYDROCHLORIDE 0.4 MG/1
0.4 CAPSULE ORAL DAILY
Qty: 90 CAPSULE | Refills: 1 | Status: SHIPPED | OUTPATIENT
Start: 2019-06-21 | End: 2019-12-04 | Stop reason: SDUPTHER

## 2019-07-04 ENCOUNTER — APPOINTMENT (OUTPATIENT)
Dept: CT IMAGING | Age: 79
End: 2019-07-04
Payer: MEDICARE

## 2019-07-04 ENCOUNTER — HOSPITAL ENCOUNTER (EMERGENCY)
Age: 79
Discharge: HOME | End: 2019-07-04
Attending: EMERGENCY MEDICINE
Payer: MEDICARE

## 2019-07-04 VITALS
TEMPERATURE: 97.6 F | OXYGEN SATURATION: 97 % | RESPIRATION RATE: 16 BRPM | DIASTOLIC BLOOD PRESSURE: 86 MMHG | HEART RATE: 72 BPM | SYSTOLIC BLOOD PRESSURE: 140 MMHG

## 2019-07-04 DIAGNOSIS — R10.30 LOWER ABDOMINAL PAIN: Primary | ICD-10-CM

## 2019-07-04 LAB
ALBUMIN SERPL-MCNC: 4.1 G/DL (ref 3.4–5)
ALP BLD-CCNC: 114 U/L (ref 40–129)
ALT SERPL-CCNC: 25 U/L (ref 10–40)
ANION GAP SERPL CALCULATED.3IONS-SCNC: 12 MMOL/L (ref 3–16)
AST SERPL-CCNC: 33 U/L (ref 15–37)
BASOPHILS ABSOLUTE: 0.1 K/UL (ref 0–0.2)
BASOPHILS RELATIVE PERCENT: 1.3 %
BILIRUB SERPL-MCNC: 0.8 MG/DL (ref 0–1)
BILIRUBIN DIRECT: <0.2 MG/DL (ref 0–0.3)
BILIRUBIN URINE: NEGATIVE
BILIRUBIN, INDIRECT: NORMAL MG/DL (ref 0–1)
BLOOD, URINE: NEGATIVE
BUN BLDV-MCNC: 14 MG/DL (ref 7–20)
CALCIUM SERPL-MCNC: 9.7 MG/DL (ref 8.3–10.6)
CHLORIDE BLD-SCNC: 97 MMOL/L (ref 99–110)
CLARITY: CLEAR
CO2: 27 MMOL/L (ref 21–32)
COLOR: YELLOW
CREAT SERPL-MCNC: 0.9 MG/DL (ref 0.8–1.3)
EOSINOPHILS ABSOLUTE: 0.2 K/UL (ref 0–0.6)
EOSINOPHILS RELATIVE PERCENT: 2.1 %
EPITHELIAL CELLS, UA: NORMAL /HPF
GFR AFRICAN AMERICAN: >60
GFR NON-AFRICAN AMERICAN: >60
GLUCOSE BLD-MCNC: 103 MG/DL (ref 70–99)
GLUCOSE URINE: NEGATIVE MG/DL
HCT VFR BLD CALC: 45.2 % (ref 40.5–52.5)
HEMOGLOBIN: 15 G/DL (ref 13.5–17.5)
KETONES, URINE: NEGATIVE MG/DL
LEUKOCYTE ESTERASE, URINE: NEGATIVE
LIPASE: 35 U/L (ref 13–60)
LYMPHOCYTES ABSOLUTE: 1.4 K/UL (ref 1–5.1)
LYMPHOCYTES RELATIVE PERCENT: 18.4 %
MCH RBC QN AUTO: 29.2 PG (ref 26–34)
MCHC RBC AUTO-ENTMCNC: 33.3 G/DL (ref 31–36)
MCV RBC AUTO: 87.9 FL (ref 80–100)
MICROSCOPIC EXAMINATION: YES
MONOCYTES ABSOLUTE: 0.9 K/UL (ref 0–1.3)
MONOCYTES RELATIVE PERCENT: 10.9 %
NEUTROPHILS ABSOLUTE: 5.3 K/UL (ref 1.7–7.7)
NEUTROPHILS RELATIVE PERCENT: 67.3 %
NITRITE, URINE: NEGATIVE
PDW BLD-RTO: 15.6 % (ref 12.4–15.4)
PH UA: 7.5 (ref 5–8)
PLATELET # BLD: 337 K/UL (ref 135–450)
PMV BLD AUTO: 7 FL (ref 5–10.5)
POTASSIUM SERPL-SCNC: 4.5 MMOL/L (ref 3.5–5.1)
PROTEIN UA: ABNORMAL MG/DL
RBC # BLD: 5.15 M/UL (ref 4.2–5.9)
RBC UA: NORMAL /HPF (ref 0–2)
SODIUM BLD-SCNC: 136 MMOL/L (ref 136–145)
SPECIFIC GRAVITY UA: 1.01 (ref 1–1.03)
TOTAL PROTEIN: 7.5 G/DL (ref 6.4–8.2)
URINE TYPE: ABNORMAL
UROBILINOGEN, URINE: 0.2 E.U./DL
WBC # BLD: 7.8 K/UL (ref 4–11)
WBC UA: NORMAL /HPF (ref 0–5)

## 2019-07-04 PROCEDURE — 99284 EMERGENCY DEPT VISIT MOD MDM: CPT

## 2019-07-04 PROCEDURE — 6360000004 HC RX CONTRAST MEDICATION: Performed by: EMERGENCY MEDICINE

## 2019-07-04 PROCEDURE — 80076 HEPATIC FUNCTION PANEL: CPT

## 2019-07-04 PROCEDURE — 80048 BASIC METABOLIC PNL TOTAL CA: CPT

## 2019-07-04 PROCEDURE — 83690 ASSAY OF LIPASE: CPT

## 2019-07-04 PROCEDURE — 81001 URINALYSIS AUTO W/SCOPE: CPT

## 2019-07-04 PROCEDURE — 85025 COMPLETE CBC W/AUTO DIFF WBC: CPT

## 2019-07-04 PROCEDURE — 74177 CT ABD & PELVIS W/CONTRAST: CPT

## 2019-07-04 RX ADMIN — IOPAMIDOL 80 ML: 755 INJECTION, SOLUTION INTRAVENOUS at 06:40

## 2019-07-04 ASSESSMENT — ENCOUNTER SYMPTOMS
NAUSEA: 0
EYES NEGATIVE: 1
ABDOMINAL DISTENTION: 1
VOMITING: 0
DIARRHEA: 0
RESPIRATORY NEGATIVE: 1

## 2019-07-04 ASSESSMENT — PAIN SCALES - GENERAL: PAINLEVEL_OUTOF10: 2

## 2019-07-04 NOTE — ED NOTES
Patient prepared for and ready to be discharged. Patient discharged at this time in no acute distress after verbalizing understanding of discharge instructions. Patient left after receiving After Visit Summary instructions.         Eliane Garza RN  07/04/19 2335

## 2019-09-10 RX ORDER — ROSUVASTATIN CALCIUM 10 MG/1
10 TABLET, COATED ORAL DAILY
Qty: 90 TABLET | Refills: 1 | Status: SHIPPED | OUTPATIENT
Start: 2019-09-10 | End: 2020-03-16

## 2019-09-27 ENCOUNTER — OFFICE VISIT (OUTPATIENT)
Dept: FAMILY MEDICINE CLINIC | Age: 79
End: 2019-09-27
Payer: MEDICARE

## 2019-09-27 VITALS
OXYGEN SATURATION: 98 % | DIASTOLIC BLOOD PRESSURE: 80 MMHG | HEART RATE: 89 BPM | RESPIRATION RATE: 10 BRPM | SYSTOLIC BLOOD PRESSURE: 120 MMHG

## 2019-09-27 DIAGNOSIS — Z23 NEED FOR INFLUENZA VACCINATION: ICD-10-CM

## 2019-09-27 DIAGNOSIS — Z00.00 ROUTINE GENERAL MEDICAL EXAMINATION AT A HEALTH CARE FACILITY: Primary | ICD-10-CM

## 2019-09-27 DIAGNOSIS — R73.9 ELEVATED BLOOD SUGAR: ICD-10-CM

## 2019-09-27 DIAGNOSIS — I10 ESSENTIAL HYPERTENSION: ICD-10-CM

## 2019-09-27 DIAGNOSIS — Z00.00 ROUTINE GENERAL MEDICAL EXAMINATION AT A HEALTH CARE FACILITY: ICD-10-CM

## 2019-09-27 DIAGNOSIS — T84.50XS INFECTION OR INFLAMMATORY REACTION DUE TO INTERNAL JOINT PROSTHESIS, SEQUELA: Chronic | ICD-10-CM

## 2019-09-27 LAB
A/G RATIO: 1.8 (ref 1.1–2.2)
ALBUMIN SERPL-MCNC: 4.6 G/DL (ref 3.4–5)
ALP BLD-CCNC: 112 U/L (ref 40–129)
ALT SERPL-CCNC: 23 U/L (ref 10–40)
ANION GAP SERPL CALCULATED.3IONS-SCNC: 16 MMOL/L (ref 3–16)
AST SERPL-CCNC: 34 U/L (ref 15–37)
BASOPHILS ABSOLUTE: 0.1 K/UL (ref 0–0.2)
BASOPHILS RELATIVE PERCENT: 0.7 %
BILIRUB SERPL-MCNC: 0.8 MG/DL (ref 0–1)
BUN BLDV-MCNC: 12 MG/DL (ref 7–20)
C-REACTIVE PROTEIN: 2.4 MG/L (ref 0–5.1)
CALCIUM SERPL-MCNC: 9.5 MG/DL (ref 8.3–10.6)
CHLORIDE BLD-SCNC: 96 MMOL/L (ref 99–110)
CHOLESTEROL, TOTAL: 137 MG/DL (ref 0–199)
CO2: 24 MMOL/L (ref 21–32)
CREAT SERPL-MCNC: 0.9 MG/DL (ref 0.8–1.3)
EOSINOPHILS ABSOLUTE: 0.1 K/UL (ref 0–0.6)
EOSINOPHILS RELATIVE PERCENT: 1.5 %
GFR AFRICAN AMERICAN: >60
GFR NON-AFRICAN AMERICAN: >60
GLOBULIN: 2.5 G/DL
GLUCOSE BLD-MCNC: 100 MG/DL (ref 70–99)
HCT VFR BLD CALC: 47.3 % (ref 40.5–52.5)
HDLC SERPL-MCNC: 70 MG/DL (ref 40–60)
HEMOGLOBIN: 15.6 G/DL (ref 13.5–17.5)
LDL CHOLESTEROL CALCULATED: 57 MG/DL
LYMPHOCYTES ABSOLUTE: 1.1 K/UL (ref 1–5.1)
LYMPHOCYTES RELATIVE PERCENT: 13.7 %
MCH RBC QN AUTO: 29.7 PG (ref 26–34)
MCHC RBC AUTO-ENTMCNC: 33 G/DL (ref 31–36)
MCV RBC AUTO: 90 FL (ref 80–100)
MONOCYTES ABSOLUTE: 0.9 K/UL (ref 0–1.3)
MONOCYTES RELATIVE PERCENT: 10.5 %
NEUTROPHILS ABSOLUTE: 6.1 K/UL (ref 1.7–7.7)
NEUTROPHILS RELATIVE PERCENT: 73.6 %
PDW BLD-RTO: 15.2 % (ref 12.4–15.4)
PLATELET # BLD: 299 K/UL (ref 135–450)
PMV BLD AUTO: 7.8 FL (ref 5–10.5)
POTASSIUM SERPL-SCNC: 4.6 MMOL/L (ref 3.5–5.1)
RBC # BLD: 5.25 M/UL (ref 4.2–5.9)
SEDIMENTATION RATE, ERYTHROCYTE: 3 MM/HR (ref 0–20)
SODIUM BLD-SCNC: 136 MMOL/L (ref 136–145)
TOTAL PROTEIN: 7.1 G/DL (ref 6.4–8.2)
TRIGL SERPL-MCNC: 52 MG/DL (ref 0–150)
VLDLC SERPL CALC-MCNC: 10 MG/DL
WBC # BLD: 8.2 K/UL (ref 4–11)

## 2019-09-27 PROCEDURE — G0008 ADMIN INFLUENZA VIRUS VAC: HCPCS | Performed by: FAMILY MEDICINE

## 2019-09-27 PROCEDURE — 4040F PNEUMOC VAC/ADMIN/RCVD: CPT | Performed by: FAMILY MEDICINE

## 2019-09-27 PROCEDURE — G0439 PPPS, SUBSEQ VISIT: HCPCS | Performed by: FAMILY MEDICINE

## 2019-09-27 PROCEDURE — 90653 IIV ADJUVANT VACCINE IM: CPT | Performed by: FAMILY MEDICINE

## 2019-09-27 PROCEDURE — 1123F ACP DISCUSS/DSCN MKR DOCD: CPT | Performed by: FAMILY MEDICINE

## 2019-09-27 ASSESSMENT — LIFESTYLE VARIABLES
HOW OFTEN DURING THE LAST YEAR HAVE YOU HAD A FEELING OF GUILT OR REMORSE AFTER DRINKING: 0
HOW MANY STANDARD DRINKS CONTAINING ALCOHOL DO YOU HAVE ON A TYPICAL DAY: 0
HAS A RELATIVE, FRIEND, DOCTOR, OR ANOTHER HEALTH PROFESSIONAL EXPRESSED CONCERN ABOUT YOUR DRINKING OR SUGGESTED YOU CUT DOWN: 0
HOW OFTEN DO YOU HAVE SIX OR MORE DRINKS ON ONE OCCASION: 0
HOW OFTEN DURING THE LAST YEAR HAVE YOU NEEDED AN ALCOHOLIC DRINK FIRST THING IN THE MORNING TO GET YOURSELF GOING AFTER A NIGHT OF HEAVY DRINKING: 0
HOW OFTEN DO YOU HAVE A DRINK CONTAINING ALCOHOL: 4
AUDIT-C TOTAL SCORE: 4
AUDIT TOTAL SCORE: 4
HOW OFTEN DURING THE LAST YEAR HAVE YOU BEEN UNABLE TO REMEMBER WHAT HAPPENED THE NIGHT BEFORE BECAUSE YOU HAD BEEN DRINKING: 0
HOW OFTEN DURING THE LAST YEAR HAVE YOU FOUND THAT YOU WERE NOT ABLE TO STOP DRINKING ONCE YOU HAD STARTED: 0
HAVE YOU OR SOMEONE ELSE BEEN INJURED AS A RESULT OF YOUR DRINKING: 0
HOW OFTEN DURING THE LAST YEAR HAVE YOU FAILED TO DO WHAT WAS NORMALLY EXPECTED FROM YOU BECAUSE OF DRINKING: 0

## 2019-09-27 ASSESSMENT — PATIENT HEALTH QUESTIONNAIRE - PHQ9
SUM OF ALL RESPONSES TO PHQ QUESTIONS 1-9: 0
SUM OF ALL RESPONSES TO PHQ QUESTIONS 1-9: 0

## 2019-09-28 LAB
ESTIMATED AVERAGE GLUCOSE: 128.4 MG/DL
HBA1C MFR BLD: 6.1 %

## 2019-11-06 ENCOUNTER — OFFICE VISIT (OUTPATIENT)
Dept: FAMILY MEDICINE CLINIC | Age: 79
End: 2019-11-06
Payer: MEDICARE

## 2019-11-06 VITALS
DIASTOLIC BLOOD PRESSURE: 82 MMHG | WEIGHT: 176.4 LBS | HEIGHT: 64 IN | OXYGEN SATURATION: 94 % | HEART RATE: 77 BPM | BODY MASS INDEX: 30.11 KG/M2 | SYSTOLIC BLOOD PRESSURE: 130 MMHG

## 2019-11-06 DIAGNOSIS — S61.214D LACERATION OF RIGHT RING FINGER WITHOUT FOREIGN BODY WITHOUT DAMAGE TO NAIL, SUBSEQUENT ENCOUNTER: Primary | ICD-10-CM

## 2019-11-06 PROCEDURE — 99212 OFFICE O/P EST SF 10 MIN: CPT | Performed by: FAMILY MEDICINE

## 2019-11-06 PROCEDURE — 1036F TOBACCO NON-USER: CPT | Performed by: FAMILY MEDICINE

## 2019-11-06 PROCEDURE — 1123F ACP DISCUSS/DSCN MKR DOCD: CPT | Performed by: FAMILY MEDICINE

## 2019-11-06 PROCEDURE — G8482 FLU IMMUNIZE ORDER/ADMIN: HCPCS | Performed by: FAMILY MEDICINE

## 2019-11-06 PROCEDURE — 4040F PNEUMOC VAC/ADMIN/RCVD: CPT | Performed by: FAMILY MEDICINE

## 2019-11-06 PROCEDURE — G8417 CALC BMI ABV UP PARAM F/U: HCPCS | Performed by: FAMILY MEDICINE

## 2019-11-06 PROCEDURE — G8427 DOCREV CUR MEDS BY ELIG CLIN: HCPCS | Performed by: FAMILY MEDICINE

## 2019-11-06 RX ORDER — AMOXICILLIN 500 MG/1
CAPSULE ORAL
Qty: 12 CAPSULE | Refills: 1 | Status: SHIPPED | OUTPATIENT
Start: 2019-11-06 | End: 2021-10-28 | Stop reason: ALTCHOICE

## 2019-11-09 ENCOUNTER — HOSPITAL ENCOUNTER (EMERGENCY)
Age: 79
Discharge: HOME | End: 2019-11-09
Attending: EMERGENCY MEDICINE
Payer: MEDICARE

## 2019-11-09 VITALS
HEIGHT: 63 IN | RESPIRATION RATE: 16 BRPM | WEIGHT: 170 LBS | DIASTOLIC BLOOD PRESSURE: 98 MMHG | BODY MASS INDEX: 30.12 KG/M2 | OXYGEN SATURATION: 98 % | TEMPERATURE: 98.5 F | SYSTOLIC BLOOD PRESSURE: 145 MMHG | HEART RATE: 74 BPM

## 2019-11-09 DIAGNOSIS — S05.01XA ABRASION OF RIGHT CORNEA, INITIAL ENCOUNTER: Primary | ICD-10-CM

## 2019-11-09 DIAGNOSIS — H57.11 ACUTE RIGHT EYE PAIN: ICD-10-CM

## 2019-11-09 PROCEDURE — 6370000000 HC RX 637 (ALT 250 FOR IP): Performed by: PODIATRIST

## 2019-11-09 PROCEDURE — 99283 EMERGENCY DEPT VISIT LOW MDM: CPT

## 2019-11-09 PROCEDURE — 4500000023 HC ED LEVEL 3 PROCEDURE

## 2019-11-09 RX ORDER — ERYTHROMYCIN 5 MG/G
OINTMENT OPHTHALMIC
Qty: 1 TUBE | Refills: 0 | Status: SHIPPED | OUTPATIENT
Start: 2019-11-09 | End: 2020-09-09 | Stop reason: ALTCHOICE

## 2019-11-09 RX ORDER — TETRACAINE HYDROCHLORIDE 5 MG/ML
1 SOLUTION OPHTHALMIC ONCE
Status: COMPLETED | OUTPATIENT
Start: 2019-11-09 | End: 2019-11-09

## 2019-11-09 RX ADMIN — FLUORESCEIN SODIUM 1 MG: 1 STRIP OPHTHALMIC at 14:52

## 2019-11-09 RX ADMIN — TETRACAINE HYDROCHLORIDE 1 DROP: 5 SOLUTION OPHTHALMIC at 14:51

## 2019-11-09 ASSESSMENT — VISUAL ACUITY
OU: 20/30
OS: 20/40
OD: 20/40

## 2019-11-09 ASSESSMENT — PAIN DESCRIPTION - DESCRIPTORS: DESCRIPTORS: DISCOMFORT

## 2019-11-09 ASSESSMENT — PAIN DESCRIPTION - FREQUENCY: FREQUENCY: INTERMITTENT

## 2019-11-09 ASSESSMENT — PAIN SCALES - GENERAL: PAINLEVEL_OUTOF10: 5

## 2019-11-09 ASSESSMENT — PAIN DESCRIPTION - LOCATION: LOCATION: EYE

## 2019-11-09 ASSESSMENT — PAIN DESCRIPTION - PAIN TYPE: TYPE: ACUTE PAIN

## 2019-11-09 ASSESSMENT — PAIN DESCRIPTION - ORIENTATION: ORIENTATION: RIGHT

## 2019-12-04 RX ORDER — TAMSULOSIN HYDROCHLORIDE 0.4 MG/1
0.4 CAPSULE ORAL DAILY
Qty: 90 CAPSULE | Refills: 1 | Status: SHIPPED | OUTPATIENT
Start: 2019-12-04 | End: 2020-06-08

## 2020-01-07 ENCOUNTER — OFFICE VISIT (OUTPATIENT)
Dept: FAMILY MEDICINE CLINIC | Age: 80
End: 2020-01-07
Payer: MEDICARE

## 2020-01-07 VITALS
DIASTOLIC BLOOD PRESSURE: 70 MMHG | WEIGHT: 172 LBS | SYSTOLIC BLOOD PRESSURE: 110 MMHG | OXYGEN SATURATION: 97 % | HEART RATE: 76 BPM | HEIGHT: 63 IN | BODY MASS INDEX: 30.48 KG/M2

## 2020-01-07 PROBLEM — R73.03 PREDIABETES: Status: ACTIVE | Noted: 2020-01-07

## 2020-01-07 PROCEDURE — G8427 DOCREV CUR MEDS BY ELIG CLIN: HCPCS | Performed by: FAMILY MEDICINE

## 2020-01-07 PROCEDURE — G8417 CALC BMI ABV UP PARAM F/U: HCPCS | Performed by: FAMILY MEDICINE

## 2020-01-07 PROCEDURE — G8482 FLU IMMUNIZE ORDER/ADMIN: HCPCS | Performed by: FAMILY MEDICINE

## 2020-01-07 PROCEDURE — 1036F TOBACCO NON-USER: CPT | Performed by: FAMILY MEDICINE

## 2020-01-07 PROCEDURE — 1123F ACP DISCUSS/DSCN MKR DOCD: CPT | Performed by: FAMILY MEDICINE

## 2020-01-07 PROCEDURE — 4040F PNEUMOC VAC/ADMIN/RCVD: CPT | Performed by: FAMILY MEDICINE

## 2020-01-07 PROCEDURE — 99214 OFFICE O/P EST MOD 30 MIN: CPT | Performed by: FAMILY MEDICINE

## 2020-03-16 RX ORDER — ROSUVASTATIN CALCIUM 10 MG/1
10 TABLET, COATED ORAL DAILY
Qty: 90 TABLET | Refills: 1 | Status: SHIPPED | OUTPATIENT
Start: 2020-03-16 | End: 2020-08-10

## 2020-06-08 RX ORDER — TAMSULOSIN HYDROCHLORIDE 0.4 MG/1
CAPSULE ORAL
Qty: 90 CAPSULE | Refills: 1 | Status: SHIPPED | OUTPATIENT
Start: 2020-06-08 | End: 2020-12-07 | Stop reason: SDUPTHER

## 2020-08-10 RX ORDER — ROSUVASTATIN CALCIUM 10 MG/1
TABLET, COATED ORAL
Qty: 90 TABLET | Refills: 0 | Status: SHIPPED | OUTPATIENT
Start: 2020-08-10 | End: 2020-12-07

## 2020-08-21 DIAGNOSIS — E78.2 MIXED HYPERLIPIDEMIA: ICD-10-CM

## 2020-08-21 DIAGNOSIS — Z12.5 SCREENING PSA (PROSTATE SPECIFIC ANTIGEN): ICD-10-CM

## 2020-08-21 DIAGNOSIS — R73.03 PREDIABETES: ICD-10-CM

## 2020-08-21 LAB
A/G RATIO: 1.6 (ref 1.1–2.2)
ALBUMIN SERPL-MCNC: 4.1 G/DL (ref 3.4–5)
ALP BLD-CCNC: 99 U/L (ref 40–129)
ALT SERPL-CCNC: 19 U/L (ref 10–40)
ANION GAP SERPL CALCULATED.3IONS-SCNC: 12 MMOL/L (ref 3–16)
AST SERPL-CCNC: 30 U/L (ref 15–37)
BILIRUB SERPL-MCNC: 0.9 MG/DL (ref 0–1)
BUN BLDV-MCNC: 13 MG/DL (ref 7–20)
CALCIUM SERPL-MCNC: 9.4 MG/DL (ref 8.3–10.6)
CHLORIDE BLD-SCNC: 98 MMOL/L (ref 99–110)
CHOLESTEROL, TOTAL: 142 MG/DL (ref 0–199)
CO2: 23 MMOL/L (ref 21–32)
CREAT SERPL-MCNC: 0.9 MG/DL (ref 0.8–1.3)
GFR AFRICAN AMERICAN: >60
GFR NON-AFRICAN AMERICAN: >60
GLOBULIN: 2.6 G/DL
GLUCOSE BLD-MCNC: 104 MG/DL (ref 70–99)
HDLC SERPL-MCNC: 57 MG/DL (ref 40–60)
LDL CHOLESTEROL CALCULATED: 77 MG/DL
POTASSIUM SERPL-SCNC: 4.7 MMOL/L (ref 3.5–5.1)
PROSTATE SPECIFIC ANTIGEN: 3.65 NG/ML (ref 0–4)
SODIUM BLD-SCNC: 133 MMOL/L (ref 136–145)
TOTAL PROTEIN: 6.7 G/DL (ref 6.4–8.2)
TRIGL SERPL-MCNC: 40 MG/DL (ref 0–150)
VLDLC SERPL CALC-MCNC: 8 MG/DL

## 2020-08-22 LAB
ESTIMATED AVERAGE GLUCOSE: 134.1 MG/DL
HBA1C MFR BLD: 6.3 %

## 2020-09-09 ENCOUNTER — OFFICE VISIT (OUTPATIENT)
Dept: FAMILY MEDICINE CLINIC | Age: 80
End: 2020-09-09
Payer: MEDICARE

## 2020-09-09 VITALS
OXYGEN SATURATION: 97 % | BODY MASS INDEX: 30.23 KG/M2 | HEIGHT: 63 IN | DIASTOLIC BLOOD PRESSURE: 78 MMHG | TEMPERATURE: 97.8 F | WEIGHT: 170.6 LBS | SYSTOLIC BLOOD PRESSURE: 136 MMHG | HEART RATE: 78 BPM

## 2020-09-09 PROCEDURE — G0008 ADMIN INFLUENZA VIRUS VAC: HCPCS | Performed by: FAMILY MEDICINE

## 2020-09-09 PROCEDURE — 99214 OFFICE O/P EST MOD 30 MIN: CPT | Performed by: FAMILY MEDICINE

## 2020-09-09 PROCEDURE — G8427 DOCREV CUR MEDS BY ELIG CLIN: HCPCS | Performed by: FAMILY MEDICINE

## 2020-09-09 PROCEDURE — G8417 CALC BMI ABV UP PARAM F/U: HCPCS | Performed by: FAMILY MEDICINE

## 2020-09-09 PROCEDURE — 90694 VACC AIIV4 NO PRSRV 0.5ML IM: CPT | Performed by: FAMILY MEDICINE

## 2020-09-09 PROCEDURE — 4040F PNEUMOC VAC/ADMIN/RCVD: CPT | Performed by: FAMILY MEDICINE

## 2020-09-09 PROCEDURE — 1123F ACP DISCUSS/DSCN MKR DOCD: CPT | Performed by: FAMILY MEDICINE

## 2020-09-09 PROCEDURE — 1036F TOBACCO NON-USER: CPT | Performed by: FAMILY MEDICINE

## 2020-09-09 ASSESSMENT — PATIENT HEALTH QUESTIONNAIRE - PHQ9
2. FEELING DOWN, DEPRESSED OR HOPELESS: 0
SUM OF ALL RESPONSES TO PHQ9 QUESTIONS 1 & 2: 0
1. LITTLE INTEREST OR PLEASURE IN DOING THINGS: 0
SUM OF ALL RESPONSES TO PHQ QUESTIONS 1-9: 0
SUM OF ALL RESPONSES TO PHQ QUESTIONS 1-9: 0

## 2020-09-09 NOTE — PROGRESS NOTES
Vaccine Information Sheet, \"Influenza - Inactivated\"  given to Aysha Files, or parent/legal guardian of  Aysha Files and verbalized understanding. Patient responses:    Have you ever had a reaction to a flu vaccine? No  Do you have any current illness? No  Have you ever had Guillian [place] Syndrome? No  Do you have a serious allergy to any of the follow: Neomycin, Polymyxin, Thimerosal, eggs or egg products? No    Flu vaccine given per order. Please see immunization tab. Risks and benefits explained. Current VIS given.

## 2020-09-09 NOTE — PROGRESS NOTES
Subjective:      Patient ID: Lyndsey Dixon is a 78 y.o. male. HPI   Pt is a of 78 y.o. male comes in today with   Chief Complaint   Patient presents with    6 Month Follow-Up     Patient is here for 6 month f/u, had fasting labs on 8/21/20. follow up [facility], hyperlipidemia, bph  No complaints      Past Medical History:Reviewed  Medications:Reviewed. Allergies   Allergen Reactions    Demerol     Oxycodone Hcl     Flomax [Tamsulosin Hcl]      Patient tolerates, treated on medication.  Meperidine Nausea And Vomiting      Social hx:Reviewed. Social History     Tobacco Use   Smoking Status Never Smoker   Smokeless Tobacco Never Used       Vitals:    09/09/20 0802   BP: 136/78   Site: Left Upper Arm   Position: Sitting   Cuff Size: Medium Adult   Pulse: 78   Temp: 97.8 °F (36.6 °C)   TempSrc: Temporal   SpO2: 97%   Weight: 170 lb 9.6 oz (77.4 kg)   Height: 5' 3\" (1.6 m)        Review of Systems    Objective:   Physical Exam  Constitutional:       Appearance: He is well-developed. HENT:      Head: Normocephalic and atraumatic. Mouth/Throat:      Pharynx: No oropharyngeal exudate. Eyes:      Conjunctiva/sclera: Conjunctivae normal.   Neck:      Musculoskeletal: Neck supple. Thyroid: No thyromegaly. Vascular: No carotid bruit. Cardiovascular:      Rate and Rhythm: Normal rate and regular rhythm. Heart sounds: Normal heart sounds. No murmur. Pulmonary:      Effort: Pulmonary effort is normal.      Breath sounds: Normal breath sounds. No rales. Lymphadenopathy:      Cervical: No cervical adenopathy. Skin:     General: Skin is warm and dry. Nails: There is no clubbing. Neurological:      Mental Status: He is alert and oriented to person, place, and time. Cranial Nerves: No cranial nerve deficit. Assessment:       Diagnosis Orders   1. Prediabetes     2. Flu vaccine need  INFLUENZA, QUADV, ADJUVANTED, 65 YRS =, IM, PF, PREFILL SYR, 0.5ML (FLUAD)   3.  Mixed hyperlipidemia     4. BPH with obstruction/lower urinary tract symptoms     5. Mild cognitive impairment            Plan:      Deonte Saucedo was seen today for 6 month follow-up. Diagnoses and all orders for this visit:    Prediabetes  Stable with diet  Flu vaccine need  -     INFLUENZA, QUADV, ADJUVANTED, 65 YRS =, IM, PF, PREFILL SYR, 0.5ML (FLUAD)    Mixed hyperlipidemia  Stable on statin  BPH with obstruction/lower urinary tract symptoms  Symptoms stable on flomax  Mild cognitive impairment  Has been stable.  Pt declined medication           Lionel Healy MD

## 2020-10-16 ENCOUNTER — OFFICE VISIT (OUTPATIENT)
Dept: FAMILY MEDICINE CLINIC | Age: 80
End: 2020-10-16
Payer: MEDICARE

## 2020-10-16 VITALS
SYSTOLIC BLOOD PRESSURE: 125 MMHG | HEIGHT: 64 IN | BODY MASS INDEX: 29.88 KG/M2 | OXYGEN SATURATION: 100 % | TEMPERATURE: 98 F | DIASTOLIC BLOOD PRESSURE: 85 MMHG | HEART RATE: 76 BPM | WEIGHT: 175 LBS

## 2020-10-16 PROCEDURE — 4040F PNEUMOC VAC/ADMIN/RCVD: CPT | Performed by: FAMILY MEDICINE

## 2020-10-16 PROCEDURE — 1123F ACP DISCUSS/DSCN MKR DOCD: CPT | Performed by: FAMILY MEDICINE

## 2020-10-16 PROCEDURE — G8484 FLU IMMUNIZE NO ADMIN: HCPCS | Performed by: FAMILY MEDICINE

## 2020-10-16 PROCEDURE — G0439 PPPS, SUBSEQ VISIT: HCPCS | Performed by: FAMILY MEDICINE

## 2020-10-16 ASSESSMENT — PATIENT HEALTH QUESTIONNAIRE - PHQ9
1. LITTLE INTEREST OR PLEASURE IN DOING THINGS: 0
SUM OF ALL RESPONSES TO PHQ QUESTIONS 1-9: 0
2. FEELING DOWN, DEPRESSED OR HOPELESS: 0
SUM OF ALL RESPONSES TO PHQ9 QUESTIONS 1 & 2: 0

## 2020-10-16 ASSESSMENT — LIFESTYLE VARIABLES
HOW MANY STANDARD DRINKS CONTAINING ALCOHOL DO YOU HAVE ON A TYPICAL DAY: 0
AUDIT-C TOTAL SCORE: 4
HOW OFTEN DURING THE LAST YEAR HAVE YOU FAILED TO DO WHAT WAS NORMALLY EXPECTED FROM YOU BECAUSE OF DRINKING: 0
HAVE YOU OR SOMEONE ELSE BEEN INJURED AS A RESULT OF YOUR DRINKING: 0
HAVE YOU OR SOMEONE ELSE BEEN INJURED AS A RESULT OF YOUR DRINKING: 0
HOW OFTEN DURING THE LAST YEAR HAVE YOU BEEN UNABLE TO REMEMBER WHAT HAPPENED THE NIGHT BEFORE BECAUSE YOU HAD BEEN DRINKING: 0
HOW OFTEN DURING THE LAST YEAR HAVE YOU FAILED TO DO WHAT WAS NORMALLY EXPECTED FROM YOU BECAUSE OF DRINKING: 0
HOW OFTEN DURING THE LAST YEAR HAVE YOU NEEDED AN ALCOHOLIC DRINK FIRST THING IN THE MORNING TO GET YOURSELF GOING AFTER A NIGHT OF HEAVY DRINKING: 0
HAS A RELATIVE, FRIEND, DOCTOR, OR ANOTHER HEALTH PROFESSIONAL EXPRESSED CONCERN ABOUT YOUR DRINKING OR SUGGESTED YOU CUT DOWN: 0
HOW OFTEN DO YOU HAVE A DRINK CONTAINING ALCOHOL: 4
HOW OFTEN DURING THE LAST YEAR HAVE YOU BEEN UNABLE TO REMEMBER WHAT HAPPENED THE NIGHT BEFORE BECAUSE YOU HAD BEEN DRINKING: 0
HOW OFTEN DURING THE LAST YEAR HAVE YOU HAD A FEELING OF GUILT OR REMORSE AFTER DRINKING: 0
HOW OFTEN DURING THE LAST YEAR HAVE YOU HAD A FEELING OF GUILT OR REMORSE AFTER DRINKING: 0
HOW OFTEN DURING THE LAST YEAR HAVE YOU FOUND THAT YOU WERE NOT ABLE TO STOP DRINKING ONCE YOU HAD STARTED: 0
HOW OFTEN DURING THE LAST YEAR HAVE YOU NEEDED AN ALCOHOLIC DRINK FIRST THING IN THE MORNING TO GET YOURSELF GOING AFTER A NIGHT OF HEAVY DRINKING: 0
HOW OFTEN DURING THE LAST YEAR HAVE YOU FOUND THAT YOU WERE NOT ABLE TO STOP DRINKING ONCE YOU HAD STARTED: 0
HOW OFTEN DO YOU HAVE SIX OR MORE DRINKS ON ONE OCCASION: 0
HAS A RELATIVE, FRIEND, DOCTOR, OR ANOTHER HEALTH PROFESSIONAL EXPRESSED CONCERN ABOUT YOUR DRINKING OR SUGGESTED YOU CUT DOWN: 0

## 2020-10-16 NOTE — PROGRESS NOTES
Medicare Annual Wellness Visit  Name: Shelley Dan Date: 10/16/2020   MRN: <[id number]> Sex: Male   Age: 78 y.o. Ethnicity: Non-/Non    : 1940 Race: Kalli Soler is here for Medicare AWV    Screenings for behavioral, psychosocial and functional/safety risks, and cognitive dysfunction are all negative except as indicated below. These results, as well as other patient data from the [address], [place], operated by [facility] form, are documented in Flowsheets linked to this Encounter. Allergies   Allergen Reactions    Demerol     Oxycodone Hcl     Flomax [Tamsulosin Hcl]      Patient tolerates, treated on medication.  Meperidine Nausea And Vomiting       Prior to Visit Medications    Medication Sig Taking? Authorizing Provider   rosuvastatin (CRESTOR) 10 MG tablet TAKE 1 TABLET BY MOUTH EVERY DAY Yes ANUM Velazquez CNP   tamsulosin (FLOMAX) 0.4 MG capsule TAKE 1 CAPSULE BY MOUTH EVERY DAY Yes Pranav Sher MD   aspirin 81 MG tablet Take 81 mg by mouth daily Yes Historical Provider, MD   Flaxseed Linseed, 1000 MG CAPS Take 1 capsule by mouth 2 times daily. Yes Historical Provider, MD   multivitamin [facility]) per tablet Take 1 tablet by mouth daily. Yes Historical Provider, MD   Psyllium (METAMUCIL PO) Take 2 Units by mouth daily. 2 TSP. Yes Historical Provider, MD   amoxicillin (AMOXIL) 500 MG capsule Take four tablets one hour before the procedure. Patient not taking: Reported on 2020  Pranav Sher MD       Past Medical History:   Diagnosis Date    Aortic valve disorder     Arthritis     Blood transfusion     BPH (benign prostatic hypertrophy)     Cancer (HCC)     SKIN.    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    Diverticulosis of colon     Diverticulosis of large intestine 5/15/2010    Essential hypertension 2013    History of GI bleed 2012    Hydronephrosis, left 2016    Hyperlipidemia     Mitral valve disorder     MSSA (methicillin susceptible Staphylococcus aureus) septicemia ([facility] [address] 3/2012    Osteoarthritis     neck, back, knees    Partial small bowel obstruction ([address]     Staph aureus infection 3/10/12    blood    TIA (transient ischemic attack) 5/2012       Past Surgical History:   Procedure Laterality Date    AORTIC VALVE SURGERY      CARDIAC SURGERY  6/2012    angiogram-no blockage    COLONOSCOPY      HERNIA REPAIR      LEFT INGUINAL.  JOINT REPLACEMENT      RIGHT KNEE TOTAL 11/05, left knee 2006    KNEE PROSTHESIS REMOVAL Left     KNEE SURGERY  4/2012    to remove blood clot on right knee    KNEE SURGERY  3/2012    for MSSA infection bilat. knees    MITRAL VALVE SURGERY      OTHER SURGICAL HISTORY  07/03/2016    CYSTOSCOPY, LEFT URETEROSCOPY STONE MANIPULATION WITH LASER,    TOTAL KNEE ARTHROPLASTY      bilat    VASECTOMY         Family History   Problem Relation Age of Onset    Stroke Mother     Stroke Father     Heart Disease Father     Cancer Brother         skin       CareTeam (Including outside providers/suppliers regularly involved in providing care):   Patient Care Team:  Sherin Avina MD as PCP - General (Family Medicine)  Sherin Avina MD as PCP - [facility] Empaneled Provider  Rossi Del Castillo PSYD (Psychology)  Finn Hernandes MD as Consulting Physician (Cardiology)    Wt Readings from Last 3 Encounters:   10/16/20 175 lb (79.4 kg)   09/09/20 170 lb 9.6 oz (77.4 kg)   01/07/20 172 lb (78 kg)     Vitals:    10/16/20 0918   BP: 125/85   Pulse: 76   Temp: 98 °F (36.7 °C)   SpO2: 100%   Weight: 175 lb (79.4 kg)   Height: 5' 4\" (1.626 m)     Body mass index is 30.04 kg/m². Based upon direct observation of the patient, evaluation of cognition reveals recent and remote memory intact.     General Appearance: alert and oriented to person, place and time, well developed and well- nourished, in no acute distress  Skin: warm and dry, no rash or erythema  Head: normocephalic and atraumatic  Eyes: pupils equal, round, and reactive to light, extraocular eye movements intact, conjunctivae normal  ENT: tympanic membrane, external ear and ear canal normal bilaterally, nose without deformity, nasal mucosa and turbinates normal without polyps  Neck: supple and non-tender without mass, no thyromegaly or thyroid nodules, no cervical lymphadenopathy  Pulmonary/Chest: clear to auscultation bilaterally- no wheezes, rales or rhonchi, normal air movement, no respiratory distress  Cardiovascular: normal rate, regular rhythm, normal S1 and S2, no murmurs, rubs, clicks, or gallops, distal pulses intact, no carotid bruits  Abdomen: soft, non-tender, non-distended, normal bowel sounds, no masses or organomegaly  Extremities: no cyanosis, clubbing or edema  Musculoskeletal: normal range of motion, no joint swelling, deformity or tenderness  Neurologic: reflexes normal and symmetric, no cranial nerve deficit, gait, coordination and speech normal    Patient's complete Health Risk Assessment and screening values have been reviewed and are found in Flowsheets. The following problems were reviewed today and where indicated follow up appointments were made and/or referrals ordered.     Positive Risk Factor Screenings with Interventions:     Health Habits/Nutrition:  Health Habits/Nutrition  Do you exercise for at least 20 minutes 2-3 times per week?: Yes  Have you lost any weight without trying in the past 3 months?: No  Do you eat fewer than 2 meals per day?: No  Have you seen a dentist within the past year?: Yes  Body mass index: (!) 30.04  Health Habits/Nutrition Interventions:  · Nutritional issues:  educational materials for healthy, well-balanced diet provided    Personalized Preventive Plan   Current Health Maintenance Status  Immunization History   Administered Date(s) Administered    Influenza 10/07/2010, 10/18/2013    Influenza Vaccine, unspecified formulation 10/18/2013, 09/25/2015, 10/03/2016   

## 2020-11-25 ENCOUNTER — OFFICE VISIT (OUTPATIENT)
Dept: FAMILY MEDICINE CLINIC | Age: 80
End: 2020-11-25
Payer: MEDICARE

## 2020-11-25 VITALS
OXYGEN SATURATION: 97 % | BODY MASS INDEX: 29.37 KG/M2 | HEART RATE: 76 BPM | WEIGHT: 172 LBS | TEMPERATURE: 97.6 F | DIASTOLIC BLOOD PRESSURE: 82 MMHG | HEIGHT: 64 IN | SYSTOLIC BLOOD PRESSURE: 132 MMHG

## 2020-11-25 PROCEDURE — 1036F TOBACCO NON-USER: CPT | Performed by: FAMILY MEDICINE

## 2020-11-25 PROCEDURE — G8484 FLU IMMUNIZE NO ADMIN: HCPCS | Performed by: FAMILY MEDICINE

## 2020-11-25 PROCEDURE — 1123F ACP DISCUSS/DSCN MKR DOCD: CPT | Performed by: FAMILY MEDICINE

## 2020-11-25 PROCEDURE — G8427 DOCREV CUR MEDS BY ELIG CLIN: HCPCS | Performed by: FAMILY MEDICINE

## 2020-11-25 PROCEDURE — G8417 CALC BMI ABV UP PARAM F/U: HCPCS | Performed by: FAMILY MEDICINE

## 2020-11-25 PROCEDURE — 99213 OFFICE O/P EST LOW 20 MIN: CPT | Performed by: FAMILY MEDICINE

## 2020-11-25 PROCEDURE — 4040F PNEUMOC VAC/ADMIN/RCVD: CPT | Performed by: FAMILY MEDICINE

## 2020-11-25 NOTE — PROGRESS NOTES
all orders for this visit:    Left lower quadrant abdominal pain  Reviewed ddx. Suspect abd wall strain. Warm compresses and stretches. Prn tylenol  Keep symptom log. Advised to call back directly if there are further questions, or if these symptoms fail to improve as anticipated or worsen.             Desiree Savage MD

## 2020-12-07 ENCOUNTER — OFFICE VISIT (OUTPATIENT)
Dept: CARDIOLOGY CLINIC | Age: 80
End: 2020-12-07
Payer: MEDICARE

## 2020-12-07 VITALS
BODY MASS INDEX: 29.7 KG/M2 | WEIGHT: 173 LBS | SYSTOLIC BLOOD PRESSURE: 130 MMHG | HEART RATE: 73 BPM | TEMPERATURE: 97.3 F | DIASTOLIC BLOOD PRESSURE: 70 MMHG

## 2020-12-07 PROCEDURE — 1036F TOBACCO NON-USER: CPT | Performed by: INTERNAL MEDICINE

## 2020-12-07 PROCEDURE — 1123F ACP DISCUSS/DSCN MKR DOCD: CPT | Performed by: INTERNAL MEDICINE

## 2020-12-07 PROCEDURE — G8427 DOCREV CUR MEDS BY ELIG CLIN: HCPCS | Performed by: INTERNAL MEDICINE

## 2020-12-07 PROCEDURE — G8417 CALC BMI ABV UP PARAM F/U: HCPCS | Performed by: INTERNAL MEDICINE

## 2020-12-07 PROCEDURE — 99214 OFFICE O/P EST MOD 30 MIN: CPT | Performed by: INTERNAL MEDICINE

## 2020-12-07 PROCEDURE — G8484 FLU IMMUNIZE NO ADMIN: HCPCS | Performed by: INTERNAL MEDICINE

## 2020-12-07 PROCEDURE — 4040F PNEUMOC VAC/ADMIN/RCVD: CPT | Performed by: INTERNAL MEDICINE

## 2020-12-07 RX ORDER — ROSUVASTATIN CALCIUM 10 MG/1
TABLET, COATED ORAL
Qty: 90 TABLET | Refills: 0 | Status: SHIPPED | OUTPATIENT
Start: 2020-12-07 | End: 2021-07-23

## 2020-12-07 NOTE — TELEPHONE ENCOUNTER
Last appointment:  11/25/2020    Next appointment:  1/22/2021    Last refill: tamsulosin 6/8/20, crestor 8/10/20    Received refill request by fax from [facility]

## 2020-12-07 NOTE — PROGRESS NOTES
CC/HPI:   78 y.o. here for F/U. Feels good. No cp. No sob. No n/v/LH/dizziness. No syncope. No orthopnea or PND. No LE edema. No orthopnea or PND. Feels good. Tolerates meds. Past Medical History:   Diagnosis Date    Aortic valve disorder     Arthritis     Blood transfusion     BPH (benign prostatic hypertrophy)     Cancer (HCC)     SKIN. 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    Diverticulosis of colon     Diverticulosis of large intestine 5/15/2010    Essential hypertension 1/17/2013    History of GI bleed 4/2012    Hydronephrosis, left 7/2/2016    Hyperlipidemia     Mitral valve disorder     MSSA (methicillin susceptible Staphylococcus aureus) septicemia ([address] 3/2012    Osteoarthritis     neck, back, knees    Partial small bowel obstruction ([address]     Staph aureus infection 3/10/12    blood    TIA (transient ischemic attack) 5/2012     Past Surgical History:   Procedure Laterality Date    AORTIC VALVE SURGERY      CARDIAC SURGERY  6/2012    angiogram-no blockage    COLONOSCOPY      HERNIA REPAIR      LEFT INGUINAL.  JOINT REPLACEMENT      RIGHT KNEE TOTAL 11/05, left knee 2006    KNEE PROSTHESIS REMOVAL Left     KNEE SURGERY  4/2012    to remove blood clot on right knee    KNEE SURGERY  3/2012    for MSSA infection bilat. knees    MITRAL VALVE SURGERY      OTHER SURGICAL HISTORY  07/03/2016    CYSTOSCOPY, LEFT URETEROSCOPY STONE MANIPULATION WITH LASER,    TOTAL KNEE ARTHROPLASTY      bilat    VASECTOMY       Social History     Tobacco Use    Smoking status: Never Smoker    Smokeless tobacco: Never Used   Substance Use Topics    Alcohol use: Yes     Alcohol/week: 4.0 - 5.0 standard drinks     Types: 2 - 3 Glasses of wine, 2 Cans of beer per week     Comment: drinks every other day    Drug use: No     Allergies   Allergen Reactions    Demerol     Oxycodone Hcl     Flomax [Tamsulosin Hcl]      Patient tolerates, treated on medication.     Meperidine Nausea And Vomiting     Family History   Problem Relation Age of Onset    Stroke Mother     Stroke Father     Heart Disease Father     Cancer Brother         skin       Review of Systems  General: + fatigue  HEENT: No blurry or decreased vision. Pt wears hearings aids. Cardiovascular:  See HPI. No cramping in legs or buttocks when walking. Respiratory: No cough, no hemoptysis, or wheezing. No history of asthma. Gastrointestinal:  No abdominal pain, hematochezia, melana, constipation, diarrhea. Genito-Urinary: No dysuria or hematuria. Musculoskeletal:  No complaints of joint pain, joint swelling or muscular weakness/soreness. Neurological:  No dizziness, headaches,  Hx TIA  Psychological:  No anxiety or depression. Hematological and Lymphatic: No abnormal bleeding or bruising, blood clots, jaundice or swollen lymph nodes. Endocrine:  No known history of DM or thyroid disease. No malaise/lethargy, palpitations, polydipsia/polyuria, temperature intolerance or unexpected weight changes. Skin:  No rashes or non-healing ulcers. Physical Exam:  Blood pressure 130/70, pulse 73, temperature 97.3 °F (36.3 °C), weight 173 lb (78.5 kg). General (appearance): Well devel. No distress  Eyes: Anicteric. EOMI  Neck: supple. No jvd  Ears/Nose/Mouth/Thorat: No cyanosis  CV: RRR   Respiratory:  Clear b, normal respiratory effort  GI: abd s/nt/nd  Skin: Warm, dry. No rashes  Neuro/Psych: Alert and oriented x 3. Appropriate behavior  Ext:  No c/c.  No pitting edema  Pulses:  2+ carotid      Lab Results   Component Value Date    WBC 8.2 09/27/2019    HGB 15.6 09/27/2019    HCT 47.3 09/27/2019    MCV 90.0 09/27/2019     09/27/2019       Chemistry        Component Value Date/Time     (L) 08/21/2020 0835    K 4.7 08/21/2020 0835    K 4.6 06/12/2019 1753    CL 98 (L) 08/21/2020 0835    CO2 23 08/21/2020 0835    BUN 13 08/21/2020 0835    CREATININE 0.9 08/21/2020 0835        Component Value Date/Time CALCIUM 9.4 08/21/2020 0835    ALKPHOS 99 08/21/2020 0835    AST 30 08/21/2020 0835    ALT 19 08/21/2020 0835    BILITOT 0.9 08/21/2020 0835        Lab Results   Component Value Date    INR 1.11 01/11/2013    INR 1.2 (H) 07/14/2012    INR 1.3 (H) 07/13/2012    PROTIME 12.6 01/11/2013    PROTIME 15.2 (H) 07/14/2012    PROTIME 16.0 (H) 07/13/2012 12/7/20 ecg: NSR, first degree avb    Lab Results   Component Value Date    CHOL 142 08/21/2020    CHOL 137 09/27/2019    CHOL 162 06/21/2019     Lab Results   Component Value Date    TRIG 40 08/21/2020    TRIG 52 09/27/2019    TRIG 72 06/21/2019     Lab Results   Component Value Date    HDL 57 08/21/2020    HDL 70 (H) 09/27/2019    HDL 57 06/21/2019     Lab Results   Component Value Date    LDLCALC 77 08/21/2020    LDLCALC 57 09/27/2019    LDLCALC 91 06/21/2019     Lab Results   Component Value Date    LABVLDL 8 08/21/2020    LABVLDL 10 09/27/2019    LABVLDL 14 06/21/2019     No results found for: CHOLHDLRATIO    1/2019 Nuc: Normal    1/2019 TTE: EF 50%. MV repair and Ao V repair. PASP 33.    1/7/18 ECG: NSR, first degree avb, RAD, remote anteroseptal MI?    4/2018 TTE:   Left ventricular cavity size is normal. Normal left ventricular wall   thickness. Overall left ventricular systolic function appears low normal   with an estimated ejection fraction of 50%. There is abnormal septal motion.   Right ventricle size appears enlarged. Right ventricular systolic function   appears decreased.   Aortic valve leaflets appear thickened, but no stenosis or regurgitation.   Estimated pulmonary artery systolic pressure is at 34 mmHg assuming a right   atrial pressure of 3 mmHg. 1/29/2013 MRI Brain Report:  1. No evidence of recent infarct. 2. Cerebral parenchymal volume loss. 3. Prominent posterior in fluid in the posterior fossa. This could be related to arachnoid cyst and is stable from 5/20/2012.    4/2017 Echo: Normal LVEF. ERIC. Dilated and HK RV.  RVSP 38 mm Hg    2012

## 2020-12-08 RX ORDER — ROSUVASTATIN CALCIUM 10 MG/1
TABLET, COATED ORAL
Qty: 90 TABLET | Refills: 0 | Status: SHIPPED | OUTPATIENT
Start: 2020-12-08 | End: 2021-06-09

## 2020-12-08 RX ORDER — TAMSULOSIN HYDROCHLORIDE 0.4 MG/1
CAPSULE ORAL
Qty: 90 CAPSULE | Refills: 1 | Status: SHIPPED | OUTPATIENT
Start: 2020-12-08 | End: 2021-06-01

## 2021-01-11 ENCOUNTER — TELEPHONE (OUTPATIENT)
Dept: FAMILY MEDICINE CLINIC | Age: 81
End: 2021-01-11

## 2021-01-11 DIAGNOSIS — E78.2 MIXED HYPERLIPIDEMIA: ICD-10-CM

## 2021-01-11 DIAGNOSIS — R73.03 PREDIABETES: Primary | ICD-10-CM

## 2021-01-11 NOTE — TELEPHONE ENCOUNTER
Last labs 8/21/20, CMP, Lipid & A1C pending - please verify if there is anything else you want ran. 3 month follow up scheduled 1/22/21.

## 2021-01-11 NOTE — TELEPHONE ENCOUNTER
Patient is requesting lab orders be added ASAP to his chart. He has an appointment to see us on 1/22/21.

## 2021-01-20 DIAGNOSIS — R73.03 PREDIABETES: ICD-10-CM

## 2021-01-20 DIAGNOSIS — E78.2 MIXED HYPERLIPIDEMIA: ICD-10-CM

## 2021-01-20 LAB
A/G RATIO: 1.5 (ref 1.1–2.2)
ALBUMIN SERPL-MCNC: 4.1 G/DL (ref 3.4–5)
ALP BLD-CCNC: 108 U/L (ref 40–129)
ALT SERPL-CCNC: 21 U/L (ref 10–40)
ANION GAP SERPL CALCULATED.3IONS-SCNC: 10 MMOL/L (ref 3–16)
AST SERPL-CCNC: 29 U/L (ref 15–37)
BILIRUB SERPL-MCNC: 0.9 MG/DL (ref 0–1)
BUN BLDV-MCNC: 13 MG/DL (ref 7–20)
CALCIUM SERPL-MCNC: 9.9 MG/DL (ref 8.3–10.6)
CHLORIDE BLD-SCNC: 96 MMOL/L (ref 99–110)
CHOLESTEROL, TOTAL: 133 MG/DL (ref 0–199)
CO2: 27 MMOL/L (ref 21–32)
CREAT SERPL-MCNC: 0.9 MG/DL (ref 0.8–1.3)
GFR AFRICAN AMERICAN: >60
GFR NON-AFRICAN AMERICAN: >60
GLOBULIN: 2.8 G/DL
GLUCOSE BLD-MCNC: 97 MG/DL (ref 70–99)
HDLC SERPL-MCNC: 50 MG/DL (ref 40–60)
LDL CHOLESTEROL CALCULATED: 68 MG/DL
POTASSIUM SERPL-SCNC: 4.8 MMOL/L (ref 3.5–5.1)
SODIUM BLD-SCNC: 133 MMOL/L (ref 136–145)
TOTAL PROTEIN: 6.9 G/DL (ref 6.4–8.2)
TRIGL SERPL-MCNC: 75 MG/DL (ref 0–150)
VLDLC SERPL CALC-MCNC: 15 MG/DL

## 2021-01-21 LAB
ESTIMATED AVERAGE GLUCOSE: 125.5 MG/DL
HBA1C MFR BLD: 6 %

## 2021-01-22 ENCOUNTER — OFFICE VISIT (OUTPATIENT)
Dept: FAMILY MEDICINE CLINIC | Age: 81
End: 2021-01-22
Payer: MEDICARE

## 2021-01-22 VITALS
DIASTOLIC BLOOD PRESSURE: 65 MMHG | OXYGEN SATURATION: 93 % | SYSTOLIC BLOOD PRESSURE: 112 MMHG | HEIGHT: 64 IN | WEIGHT: 173 LBS | BODY MASS INDEX: 29.53 KG/M2 | TEMPERATURE: 98 F | HEART RATE: 89 BPM

## 2021-01-22 DIAGNOSIS — R73.03 PREDIABETES: ICD-10-CM

## 2021-01-22 DIAGNOSIS — E78.2 MIXED HYPERLIPIDEMIA: Primary | ICD-10-CM

## 2021-01-22 PROCEDURE — G8427 DOCREV CUR MEDS BY ELIG CLIN: HCPCS | Performed by: FAMILY MEDICINE

## 2021-01-22 PROCEDURE — G8484 FLU IMMUNIZE NO ADMIN: HCPCS | Performed by: FAMILY MEDICINE

## 2021-01-22 PROCEDURE — 1123F ACP DISCUSS/DSCN MKR DOCD: CPT | Performed by: FAMILY MEDICINE

## 2021-01-22 PROCEDURE — 99213 OFFICE O/P EST LOW 20 MIN: CPT | Performed by: FAMILY MEDICINE

## 2021-01-22 PROCEDURE — 1036F TOBACCO NON-USER: CPT | Performed by: FAMILY MEDICINE

## 2021-01-22 PROCEDURE — G8510 SCR DEP NEG, NO PLAN REQD: HCPCS | Performed by: FAMILY MEDICINE

## 2021-01-22 PROCEDURE — 4040F PNEUMOC VAC/ADMIN/RCVD: CPT | Performed by: FAMILY MEDICINE

## 2021-01-22 PROCEDURE — G8417 CALC BMI ABV UP PARAM F/U: HCPCS | Performed by: FAMILY MEDICINE

## 2021-01-22 ASSESSMENT — PATIENT HEALTH QUESTIONNAIRE - PHQ9
2. FEELING DOWN, DEPRESSED OR HOPELESS: 0
SUM OF ALL RESPONSES TO PHQ QUESTIONS 1-9: 0
SUM OF ALL RESPONSES TO PHQ QUESTIONS 1-9: 0

## 2021-01-22 NOTE — LETTER
[address]  Phone: [phone]  Fax: [phone]    Jono Blount MD        January 22, 2021     Patient: Renu Darden   YOB: 1940   Date of Visit: 1/22/2021       To Whom It May Concern: It is my medical opinion that Meeta Seay requires a disability parking placard for the following reasons:  He cannot walk 200 feet without stopping to rest.  Duration of need: 5 years    If you have any questions or concerns, please don't hesitate to call.     Sincerely,    Electronically signed by Jono Blount MD on 1/22/2021 at 9:48 AM      Jono Blount MD

## 2021-01-22 NOTE — PROGRESS NOTES
Nesha Henao (:  1940) is a 78 y.o. male,Established patient, here for evaluation of the following chief complaint(s):  Diabetes and 3 Month Follow-Up      ASSESSMENT/PLAN:  1. Mixed hyperlipidemia  well controlled on crestor  2. Prediabetes  Stable with diet    No follow-ups on file. SUBJECTIVE/OBJECTIVE:  HPI   Pt is a of 78 y.o. male comes in today with   Chief Complaint   Patient presents with    Diabetes    3 Month Follow-Up     Dm controlled with diet. On crestor for hyperlipidemia   Past Medical History:Reviewed  Medications:Reviewed. Allergies   Allergen Reactions    Demerol     Oxycodone Hcl     Flomax [Tamsulosin Hcl]      Patient tolerates, treated on medication.  Meperidine Nausea And Vomiting      Social hx:Reviewed. Social History     Tobacco Use   Smoking Status Never Smoker   Smokeless Tobacco Never Used        Vitals:    21 0920   BP: 112/65   Pulse: 89   Temp: 98 °F (36.7 °C)   SpO2: 93%   Weight: 173 lb (78.5 kg)   Height: 5' 4\" (1.626 m)        Review of Systems    Physical Exam            An electronic signature was used to authenticate this note.     --Jerrica Ferris MD

## 2021-02-22 ENCOUNTER — HOSPITAL ENCOUNTER (OUTPATIENT)
Dept: ULTRASOUND IMAGING | Age: 81
Discharge: HOME | End: 2021-02-22
Payer: MEDICARE

## 2021-02-22 ENCOUNTER — TELEPHONE (OUTPATIENT)
Dept: FAMILY MEDICINE CLINIC | Age: 81
End: 2021-02-22

## 2021-02-22 DIAGNOSIS — R10.9 LEFT FLANK PAIN: ICD-10-CM

## 2021-02-22 DIAGNOSIS — R10.9 LEFT FLANK PAIN: Primary | ICD-10-CM

## 2021-02-22 PROCEDURE — 76770 US EXAM ABDO BACK WALL COMP: CPT

## 2021-02-22 NOTE — TELEPHONE ENCOUNTER
Pt was seen 1/22, wife states tat you discussed side pain and told to call if worsened   Left side started as dull ache a couple of weeks ago, now is very painful with any movement pain is under rib area. Pt had past hernia in the same area, no other symptoms.    Please advise    # [phone] or [phone]

## 2021-02-23 ENCOUNTER — TELEPHONE (OUTPATIENT)
Dept: FAMILY MEDICINE CLINIC | Age: 81
End: 2021-02-23

## 2021-02-23 DIAGNOSIS — R10.12 ACUTE LUQ PAIN: Primary | ICD-10-CM

## 2021-02-23 DIAGNOSIS — R10.9 LEFT FLANK PAIN: Primary | ICD-10-CM

## 2021-02-23 DIAGNOSIS — R10.32 LEFT LOWER QUADRANT ABDOMINAL PAIN: ICD-10-CM

## 2021-02-23 NOTE — TELEPHONE ENCOUNTER
Wife Obed Albarran called in (ok per HIPAA) and scheduled patient for an OV on Friday with Dr. Kiki Huerta to discuss the test results and a plan to go forward.

## 2021-02-23 NOTE — TELEPHONE ENCOUNTER
Wife calling in regarding patient stating that patient went for an ultrasound yesterday and asking about more testing (with kidney's and colon - CT scan and/or colonoscopy)? Please call wife to advise (ok per HIPAA).

## 2021-02-24 DIAGNOSIS — R10.9 LEFT FLANK PAIN: ICD-10-CM

## 2021-02-24 DIAGNOSIS — R10.32 LEFT LOWER QUADRANT ABDOMINAL PAIN: ICD-10-CM

## 2021-02-24 LAB
A/G RATIO: 1.5 (ref 1.1–2.2)
ALBUMIN SERPL-MCNC: 4.1 G/DL (ref 3.4–5)
ALP BLD-CCNC: 108 U/L (ref 40–129)
ALT SERPL-CCNC: 17 U/L (ref 10–40)
ANION GAP SERPL CALCULATED.3IONS-SCNC: 9 MMOL/L (ref 3–16)
AST SERPL-CCNC: 25 U/L (ref 15–37)
BILIRUB SERPL-MCNC: 0.8 MG/DL (ref 0–1)
BUN BLDV-MCNC: 13 MG/DL (ref 7–20)
CALCIUM SERPL-MCNC: 9.4 MG/DL (ref 8.3–10.6)
CHLORIDE BLD-SCNC: 97 MMOL/L (ref 99–110)
CO2: 29 MMOL/L (ref 21–32)
CREAT SERPL-MCNC: 1 MG/DL (ref 0.8–1.3)
GFR AFRICAN AMERICAN: >60
GFR NON-AFRICAN AMERICAN: >60
GLOBULIN: 2.8 G/DL
GLUCOSE BLD-MCNC: 92 MG/DL (ref 70–99)
POTASSIUM SERPL-SCNC: 4.8 MMOL/L (ref 3.5–5.1)
SODIUM BLD-SCNC: 135 MMOL/L (ref 136–145)
TOTAL PROTEIN: 6.9 G/DL (ref 6.4–8.2)

## 2021-02-24 NOTE — TELEPHONE ENCOUNTER
New Order needed:  Comprehensive Metabolic Panel. Please put in new lab orders for pt so that he can have his CT scan completed. The labs in the system are over 27days old and cannot be used. Please call pt/wife once labs have been ordered so this can be completed today.

## 2021-03-01 ENCOUNTER — HOSPITAL ENCOUNTER (OUTPATIENT)
Dept: CT IMAGING | Age: 81
Discharge: HOME | End: 2021-03-01
Payer: MEDICARE

## 2021-03-01 DIAGNOSIS — R10.12 ACUTE LUQ PAIN: ICD-10-CM

## 2021-03-01 PROCEDURE — 74177 CT ABD & PELVIS W/CONTRAST: CPT

## 2021-03-01 PROCEDURE — 6360000004 HC RX CONTRAST MEDICATION: Performed by: FAMILY MEDICINE

## 2021-03-01 RX ADMIN — IOPAMIDOL 80 ML: 755 INJECTION, SOLUTION INTRAVENOUS at 08:46

## 2021-03-01 RX ADMIN — IOHEXOL 50 ML: 240 INJECTION, SOLUTION INTRATHECAL; INTRAVASCULAR; INTRAVENOUS; ORAL at 08:48

## 2021-03-05 ENCOUNTER — TELEPHONE (OUTPATIENT)
Dept: FAMILY MEDICINE CLINIC | Age: 81
End: 2021-03-05

## 2021-03-05 NOTE — TELEPHONE ENCOUNTER
Pt's wife called to say the pt received his Covid Vaccines at [facility] on 01/29. Pt received the second Evelene Common shot on 02/26.

## 2021-06-01 RX ORDER — TAMSULOSIN HYDROCHLORIDE 0.4 MG/1
CAPSULE ORAL
Qty: 90 CAPSULE | Refills: 1 | Status: SHIPPED | OUTPATIENT
Start: 2021-06-01 | End: 2021-07-23 | Stop reason: SDUPTHER

## 2021-06-01 NOTE — TELEPHONE ENCOUNTER
Last OV 1/22/2021   Next OV 7/23/2021    Requested Prescriptions     Pending Prescriptions Disp Refills    tamsulosin (FLOMAX) 0.4 MG capsule [Pharmacy Med Name: TAMSULOSIN HCL 0.4 MG CAPSULE] 90 capsule 1     Sig: TAKE 1 CAPSULE BY MOUTH EVERY DAY

## 2021-06-09 RX ORDER — ROSUVASTATIN CALCIUM 10 MG/1
TABLET, COATED ORAL
Qty: 90 TABLET | Refills: 0 | Status: SHIPPED | OUTPATIENT
Start: 2021-06-09 | End: 2021-07-23 | Stop reason: SDUPTHER

## 2021-07-06 ENCOUNTER — TELEPHONE (OUTPATIENT)
Dept: FAMILY MEDICINE CLINIC | Age: 81
End: 2021-07-06

## 2021-07-06 DIAGNOSIS — R73.03 PREDIABETES: ICD-10-CM

## 2021-07-06 DIAGNOSIS — Z00.00 ROUTINE GENERAL MEDICAL EXAMINATION AT A HEALTH CARE FACILITY: Primary | ICD-10-CM

## 2021-07-06 DIAGNOSIS — E78.2 MIXED HYPERLIPIDEMIA: ICD-10-CM

## 2021-07-06 NOTE — TELEPHONE ENCOUNTER
Patient would like bloodwork sent over before his physical coming up. Please advise. Patient's wife (okdarrel per mariella) would like a call when these are both sent in.   Wife- Estrellita kiss- [phone]

## 2021-07-14 DIAGNOSIS — E78.2 MIXED HYPERLIPIDEMIA: ICD-10-CM

## 2021-07-14 DIAGNOSIS — R73.03 PREDIABETES: ICD-10-CM

## 2021-07-14 LAB
A/G RATIO: 1.3 (ref 1.1–2.2)
ALBUMIN SERPL-MCNC: 3.8 G/DL (ref 3.4–5)
ALP BLD-CCNC: 104 U/L (ref 40–129)
ALT SERPL-CCNC: 20 U/L (ref 10–40)
ANION GAP SERPL CALCULATED.3IONS-SCNC: 12 MMOL/L (ref 3–16)
AST SERPL-CCNC: 29 U/L (ref 15–37)
BILIRUB SERPL-MCNC: 0.9 MG/DL (ref 0–1)
BUN BLDV-MCNC: 15 MG/DL (ref 7–20)
CALCIUM SERPL-MCNC: 9.3 MG/DL (ref 8.3–10.6)
CHLORIDE BLD-SCNC: 98 MMOL/L (ref 99–110)
CHOLESTEROL, TOTAL: 148 MG/DL (ref 0–199)
CO2: 25 MMOL/L (ref 21–32)
CREAT SERPL-MCNC: 1 MG/DL (ref 0.8–1.3)
GFR AFRICAN AMERICAN: >60
GFR NON-AFRICAN AMERICAN: >60
GLOBULIN: 2.9 G/DL
GLUCOSE BLD-MCNC: 91 MG/DL (ref 70–99)
HDLC SERPL-MCNC: 49 MG/DL (ref 40–60)
LDL CHOLESTEROL CALCULATED: 89 MG/DL
POTASSIUM SERPL-SCNC: 5 MMOL/L (ref 3.5–5.1)
SODIUM BLD-SCNC: 135 MMOL/L (ref 136–145)
TOTAL PROTEIN: 6.7 G/DL (ref 6.4–8.2)
TRIGL SERPL-MCNC: 52 MG/DL (ref 0–150)
VLDLC SERPL CALC-MCNC: 10 MG/DL

## 2021-07-15 LAB
ESTIMATED AVERAGE GLUCOSE: 131.2 MG/DL
HBA1C MFR BLD: 6.2 %

## 2021-07-23 ENCOUNTER — OFFICE VISIT (OUTPATIENT)
Dept: FAMILY MEDICINE CLINIC | Age: 81
End: 2021-07-23
Payer: MEDICARE

## 2021-07-23 VITALS
HEIGHT: 64 IN | DIASTOLIC BLOOD PRESSURE: 74 MMHG | WEIGHT: 171 LBS | SYSTOLIC BLOOD PRESSURE: 132 MMHG | OXYGEN SATURATION: 96 % | HEART RATE: 82 BPM | BODY MASS INDEX: 29.19 KG/M2 | TEMPERATURE: 97.7 F

## 2021-07-23 DIAGNOSIS — R73.03 PREDIABETES: Primary | ICD-10-CM

## 2021-07-23 DIAGNOSIS — E78.2 MIXED HYPERLIPIDEMIA: ICD-10-CM

## 2021-07-23 DIAGNOSIS — N13.8 BPH WITH OBSTRUCTION/LOWER URINARY TRACT SYMPTOMS: ICD-10-CM

## 2021-07-23 DIAGNOSIS — N40.1 BPH WITH OBSTRUCTION/LOWER URINARY TRACT SYMPTOMS: ICD-10-CM

## 2021-07-23 PROCEDURE — 1123F ACP DISCUSS/DSCN MKR DOCD: CPT | Performed by: FAMILY MEDICINE

## 2021-07-23 PROCEDURE — 4040F PNEUMOC VAC/ADMIN/RCVD: CPT | Performed by: FAMILY MEDICINE

## 2021-07-23 PROCEDURE — 1036F TOBACCO NON-USER: CPT | Performed by: FAMILY MEDICINE

## 2021-07-23 PROCEDURE — G8417 CALC BMI ABV UP PARAM F/U: HCPCS | Performed by: FAMILY MEDICINE

## 2021-07-23 PROCEDURE — 99214 OFFICE O/P EST MOD 30 MIN: CPT | Performed by: FAMILY MEDICINE

## 2021-07-23 PROCEDURE — G8427 DOCREV CUR MEDS BY ELIG CLIN: HCPCS | Performed by: FAMILY MEDICINE

## 2021-07-23 RX ORDER — ROSUVASTATIN CALCIUM 10 MG/1
10 TABLET, COATED ORAL DAILY
Qty: 90 TABLET | Refills: 3 | Status: SHIPPED | OUTPATIENT
Start: 2021-07-23 | End: 2022-08-23

## 2021-07-23 RX ORDER — TAMSULOSIN HYDROCHLORIDE 0.4 MG/1
0.8 CAPSULE ORAL DAILY
Qty: 180 CAPSULE | Refills: 3 | Status: SHIPPED | OUTPATIENT
Start: 2021-07-23 | End: 2021-12-27

## 2021-07-23 SDOH — ECONOMIC STABILITY: FOOD INSECURITY: WITHIN THE PAST 12 MONTHS, THE FOOD YOU BOUGHT JUST DIDN'T LAST AND YOU DIDN'T HAVE MONEY TO GET MORE.: NEVER TRUE

## 2021-07-23 SDOH — ECONOMIC STABILITY: FOOD INSECURITY: WITHIN THE PAST 12 MONTHS, YOU WORRIED THAT YOUR FOOD WOULD RUN OUT BEFORE YOU GOT MONEY TO BUY MORE.: NEVER TRUE

## 2021-07-23 ASSESSMENT — SOCIAL DETERMINANTS OF HEALTH (SDOH): HOW HARD IS IT FOR YOU TO PAY FOR THE VERY BASICS LIKE FOOD, HOUSING, MEDICAL CARE, AND HEATING?: NOT HARD AT ALL

## 2021-07-23 NOTE — PROGRESS NOTES
Eugenia Baker (:  1940) is a 78 y.o. male,Established patient, here for evaluation of the following chief complaint(s):  3 Month Follow-Up, Transient Ischemic Attack, and Hypertension         ASSESSMENT/PLAN:  Mary Ruffin was seen today for 3 month follow-up, transient ischemic attack, hypertension and other. Diagnoses and all orders for this visit:    Prediabetes  -     Hemoglobin A1C; Future  Stable with diet  Mixed hyperlipidemia  -     Lipid Panel; Future  -     Comprehensive Metabolic Panel; Future  Stable on statin  BPH with obstruction/lower urinary tract symptoms  Stable on flomax  Other orders  -     rosuvastatin (CRESTOR) 10 MG tablet; Take 1 tablet by mouth daily  -     tamsulosin (FLOMAX) 0.4 MG capsule; Take 2 capsules by mouth daily         No follow-ups on file. Will see derm for skin lesions    Subjective   SUBJECTIVE/OBJECTIVE:  VADIM   Pt is a of 78 y.o. male comes in today with   Chief Complaint   Patient presents with    3 Month Follow-Up    Transient Ischemic Attack    Hypertension   follow up. Feeling well. Taking rx as prescribed  Vitals:    21 0945   BP: 132/74   Pulse: 82   Temp: 97.7 °F (36.5 °C)   TempSrc: Temporal   SpO2: 96%   Weight: 171 lb (77.6 kg)   Height: 5' 4\" (1.626 m)        Review of Systems       Objective   Physical Exam         An electronic signature was used to authenticate this note.     --Ernie Marinelli MD

## 2021-07-28 ENCOUNTER — OFFICE VISIT (OUTPATIENT)
Dept: CARDIOLOGY CLINIC | Age: 81
End: 2021-07-28
Payer: MEDICARE

## 2021-07-28 VITALS
HEART RATE: 70 BPM | SYSTOLIC BLOOD PRESSURE: 134 MMHG | HEIGHT: 64 IN | BODY MASS INDEX: 29.47 KG/M2 | DIASTOLIC BLOOD PRESSURE: 72 MMHG | OXYGEN SATURATION: 96 % | WEIGHT: 172.6 LBS

## 2021-07-28 DIAGNOSIS — I05.9 MITRAL VALVE DISEASE: Primary | ICD-10-CM

## 2021-07-28 DIAGNOSIS — I35.9 AORTIC VALVE DISEASE: ICD-10-CM

## 2021-07-28 DIAGNOSIS — Z98.890 S/P AORTIC VALVE REPAIR: ICD-10-CM

## 2021-07-28 DIAGNOSIS — E78.2 MIXED HYPERLIPIDEMIA: ICD-10-CM

## 2021-07-28 PROCEDURE — 1036F TOBACCO NON-USER: CPT | Performed by: INTERNAL MEDICINE

## 2021-07-28 PROCEDURE — 1123F ACP DISCUSS/DSCN MKR DOCD: CPT | Performed by: INTERNAL MEDICINE

## 2021-07-28 PROCEDURE — 4040F PNEUMOC VAC/ADMIN/RCVD: CPT | Performed by: INTERNAL MEDICINE

## 2021-07-28 PROCEDURE — G8417 CALC BMI ABV UP PARAM F/U: HCPCS | Performed by: INTERNAL MEDICINE

## 2021-07-28 PROCEDURE — 99214 OFFICE O/P EST MOD 30 MIN: CPT | Performed by: INTERNAL MEDICINE

## 2021-07-28 PROCEDURE — G8427 DOCREV CUR MEDS BY ELIG CLIN: HCPCS | Performed by: INTERNAL MEDICINE

## 2021-07-28 NOTE — PROGRESS NOTES
Nausea And Vomiting     Family History   Problem Relation Age of Onset    Stroke Mother     Stroke Father     Heart Disease Father     Cancer Brother         skin       Review of Systems  General: + fatigue at times. HEENT: No blurry or decreased vision. Pt wears hearings aids. Cardiovascular:  See HPI. No cramping in legs or buttocks when walking. Respiratory: No cough, no hemoptysis, or wheezing. No history of asthma. Gastrointestinal:  No abdominal pain, hematochezia, melana, constipation, diarrhea. Genito-Urinary: No dysuria or hematuria. Musculoskeletal:  No complaints of joint pain, joint swelling or muscular weakness/soreness. Neurological:  No dizziness, headaches,  Hx TIA. Memory worsening. Psychological:  No anxiety or depression. Hematological and Lymphatic: No abnormal bleeding or bruising, blood clots, jaundice or swollen lymph nodes. Endocrine:  No known history of DM or thyroid disease. No malaise/lethargy, palpitations, polydipsia/polyuria, temperature intolerance or unexpected weight changes. Skin:  No rashes or non-healing ulcers. Physical Exam:  Blood pressure 134/72, pulse 70, height 5' 4\" (1.626 m), weight 172 lb 9.6 oz (78.3 kg), SpO2 96 %. General (appearance): Well devel. No distress  Eyes: Anicteric. EOMI  Neck: supple. No jvd  Ears/Nose/Mouth/Thorat: No cyanosis  CV: RRR. 1-2/6 vance. Respiratory:  Clear b, normal respiratory effort  GI: abd s/nt/nd  Skin: Warm, dry. No rashes  Neuro/Psych: Alert and oriented x 3. Appropriate behavior  Ext:  No c/c.  No pitting edema  Pulses:  2+ carotid      Lab Results   Component Value Date    WBC 8.2 09/27/2019    HGB 15.6 09/27/2019    HCT 47.3 09/27/2019    MCV 90.0 09/27/2019     09/27/2019       Chemistry        Component Value Date/Time     (L) 07/14/2021 0831    K 5.0 07/14/2021 0831    K 4.6 06/12/2019 1753    CL 98 (L) 07/14/2021 0831    CO2 25 07/14/2021 0831    BUN 15 07/14/2021 0831    CREATININE 1.0 07/14/2021 0831        Component Value Date/Time    CALCIUM 9.3 07/14/2021 0831    ALKPHOS 104 07/14/2021 0831    AST 29 07/14/2021 0831    ALT 20 07/14/2021 0831    BILITOT 0.9 07/14/2021 0831        Lab Results   Component Value Date    INR 1.11 01/11/2013    INR 1.2 (H) 07/14/2012    INR 1.3 (H) 07/13/2012    PROTIME 12.6 01/11/2013    PROTIME 15.2 (H) 07/14/2012    PROTIME 16.0 (H) 07/13/2012 7/28/21 ecg: NSR, first degree avb    Lab Results   Component Value Date    CHOL 148 07/14/2021    CHOL 133 01/20/2021    CHOL 142 08/21/2020     Lab Results   Component Value Date    TRIG 52 07/14/2021    TRIG 75 01/20/2021    TRIG 40 08/21/2020     Lab Results   Component Value Date    HDL 49 07/14/2021    HDL 50 01/20/2021    HDL 57 08/21/2020     Lab Results   Component Value Date    LDLCALC 89 07/14/2021    LDLCALC 68 01/20/2021    LDLCALC 77 08/21/2020     Lab Results   Component Value Date    LABVLDL 10 07/14/2021    LABVLDL 15 01/20/2021    LABVLDL 8 08/21/2020     No results found for: CHOLHDLRATIO    1/2019 Nuc: Normal    1/2019 TTE: EF 50%. MV repair and Ao V repair. PASP 33.    1/7/18 ECG: NSR, first degree avb, RAD, remote anteroseptal MI?    4/2018 TTE:   Left ventricular cavity size is normal. Normal left ventricular wall   thickness. Overall left ventricular systolic function appears low normal   with an estimated ejection fraction of 50%. There is abnormal septal motion.   Right ventricle size appears enlarged. Right ventricular systolic function   appears decreased.   Aortic valve leaflets appear thickened, but no stenosis or regurgitation.   Estimated pulmonary artery systolic pressure is at 34 mmHg assuming a right   atrial pressure of 3 mmHg. 1/29/2013 MRI Brain Report:  1. No evidence of recent infarct. 2. Cerebral parenchymal volume loss. 3. Prominent posterior in fluid in the posterior fossa. This could be related to arachnoid cyst and is stable from 5/20/2012.    4/2017 Echo: Normal LVEF. ERIC. Dilated and HK RV. RVSP 38 mm Hg    2012 R/[facility]  Angiographic Findings: Right dominant system  Left Main: Normal  Left Anterior Descending: Minimal mid luminal irregularities  Circumflex: Mild luminal irregularities  Right Coronary: Normal  Left Ventriculogram:  Not performed. Pressures (mm Hg):   Right Atrium: 3/1 (1)   Right Ventricle: 23/0, 2  Pulmonary Artery: 25/3 (13)   Pulmonary Artery Wedge: 9/5 (2)   Cardiac Output and Index: Thermodilution C.O.: 6.0 L/min  Thermodilution C. I.: 3.5 L/min/m2      Current Outpatient Medications:     rosuvastatin (CRESTOR) 10 MG tablet, Take 1 tablet by mouth daily, Disp: 90 tablet, Rfl: 3    tamsulosin (FLOMAX) 0.4 MG capsule, Take 2 capsules by mouth daily, Disp: 180 capsule, Rfl: 3    amoxicillin (AMOXIL) 500 MG capsule, Take four tablets one hour before the procedure., Disp: 12 capsule, Rfl: 1    aspirin 81 MG tablet, Take 81 mg by mouth daily, Disp: , Rfl:     Flaxseed, Linseed, 1000 MG CAPS, Take 1 capsule by mouth 2 times daily. , Disp: , Rfl:     multivitamin (THERAGRAN) per tablet, Take 1 tablet by mouth daily. , Disp: , Rfl:     Psyllium (METAMUCIL PO), Take 2 Units by mouth daily. 2 TSP., Disp: , Rfl:     Assessment:  78 y.o. with h/o MV and AoV repair in 7/2012. 1. Mitral valve disease    2. Aortic valve disease    3. Mixed hyperlipidemia    4. S/P aortic valve repair      Plan:  -Cont crestor  -Cont aspirin  -Offerred amlodipine (Had K of 5), and they'd like to leave alone  -F/U 8 mo  -Get echo soon    Vicky Ridley.  Xiomara Mcleod MD, [facility] [place], [facility]

## 2021-08-13 ENCOUNTER — PROCEDURE VISIT (OUTPATIENT)
Dept: CARDIOLOGY CLINIC | Age: 81
End: 2021-08-13
Payer: MEDICARE

## 2021-08-13 DIAGNOSIS — I35.9 AORTIC VALVE DISEASE: ICD-10-CM

## 2021-08-13 DIAGNOSIS — Z98.890 S/P AORTIC VALVE REPAIR: ICD-10-CM

## 2021-08-13 DIAGNOSIS — I05.9 MITRAL VALVE DISEASE: ICD-10-CM

## 2021-08-13 LAB
LV EF: 58 %
LVEF MODALITY: NORMAL

## 2021-08-13 PROCEDURE — 93306 TTE W/DOPPLER COMPLETE: CPT | Performed by: INTERNAL MEDICINE

## 2021-08-31 ENCOUNTER — TELEPHONE (OUTPATIENT)
Dept: FAMILY MEDICINE CLINIC | Age: 81
End: 2021-08-31

## 2021-08-31 DIAGNOSIS — Z23 FLU VACCINE NEED: Primary | ICD-10-CM

## 2021-08-31 NOTE — TELEPHONE ENCOUNTER
Please put pt on call back list for Flu Shots when they become available. Pt will be leaving for Vacation to [place] on 10/10.

## 2021-09-09 ENCOUNTER — NURSE ONLY (OUTPATIENT)
Dept: FAMILY MEDICINE CLINIC | Age: 81
End: 2021-09-09
Payer: MEDICARE

## 2021-09-09 DIAGNOSIS — Z23 NEED FOR VACCINATION: Primary | ICD-10-CM

## 2021-09-09 PROCEDURE — 90694 VACC AIIV4 NO PRSRV 0.5ML IM: CPT | Performed by: FAMILY MEDICINE

## 2021-09-09 PROCEDURE — G0008 ADMIN INFLUENZA VIRUS VAC: HCPCS | Performed by: FAMILY MEDICINE

## 2021-09-14 ENCOUNTER — TELEPHONE (OUTPATIENT)
Dept: FAMILY MEDICINE CLINIC | Age: 81
End: 2021-09-14

## 2021-10-28 ENCOUNTER — OFFICE VISIT (OUTPATIENT)
Dept: FAMILY MEDICINE CLINIC | Age: 81
End: 2021-10-28
Payer: MEDICARE

## 2021-10-28 ENCOUNTER — HOSPITAL ENCOUNTER (OUTPATIENT)
Dept: GENERAL RADIOLOGY | Age: 81
Discharge: HOME | End: 2021-10-28
Payer: MEDICARE

## 2021-10-28 VITALS
HEIGHT: 64 IN | DIASTOLIC BLOOD PRESSURE: 84 MMHG | BODY MASS INDEX: 29.53 KG/M2 | OXYGEN SATURATION: 97 % | HEART RATE: 72 BPM | WEIGHT: 173 LBS | SYSTOLIC BLOOD PRESSURE: 140 MMHG

## 2021-10-28 DIAGNOSIS — M54.50 CHRONIC MIDLINE LOW BACK PAIN WITHOUT SCIATICA: ICD-10-CM

## 2021-10-28 DIAGNOSIS — M54.50 CHRONIC MIDLINE LOW BACK PAIN WITHOUT SCIATICA: Primary | ICD-10-CM

## 2021-10-28 DIAGNOSIS — N40.1 BPH WITH OBSTRUCTION/LOWER URINARY TRACT SYMPTOMS: ICD-10-CM

## 2021-10-28 DIAGNOSIS — N13.8 BPH WITH OBSTRUCTION/LOWER URINARY TRACT SYMPTOMS: ICD-10-CM

## 2021-10-28 DIAGNOSIS — G89.29 CHRONIC MIDLINE LOW BACK PAIN WITHOUT SCIATICA: ICD-10-CM

## 2021-10-28 DIAGNOSIS — G89.29 CHRONIC MIDLINE LOW BACK PAIN WITHOUT SCIATICA: Primary | ICD-10-CM

## 2021-10-28 PROCEDURE — 99214 OFFICE O/P EST MOD 30 MIN: CPT | Performed by: FAMILY MEDICINE

## 2021-10-28 PROCEDURE — 72100 X-RAY EXAM L-S SPINE 2/3 VWS: CPT

## 2021-10-28 RX ORDER — DUTASTERIDE 0.5 MG/1
0.5 CAPSULE, LIQUID FILLED ORAL DAILY
Qty: 90 CAPSULE | Refills: 1 | Status: SHIPPED | OUTPATIENT
Start: 2021-10-28 | End: 2022-01-26 | Stop reason: SDUPTHER

## 2021-10-28 NOTE — PROGRESS NOTES
Rebeka Garcia (:  1940) is a 78 y.o. male,Established patient, here for evaluation of the following chief complaint(s):  Lower Back Pain (Patient complains of lower back pain that has been worsening over the past few months, is getting worse.)         ASSESSMENT/PLAN:  Mich Villaseñor was seen today for lower back pain. Diagnoses and all orders for this visit:    Chronic midline low back pain without sciatica  -     External Referral To Physical Therapy  -     XR LUMBAR SPINE (2-3 VIEWS); Future  Not well controlled  PT if xray ok  BPH with obstruction/lower urinary tract symptoms  Not well controlled. Trial of avodart  Medication side affects and adverse reactions reviewed. Other orders  -     dutasteride (AVODART) 0.5 MG capsule; Take 1 capsule by mouth daily         No follow-ups on file. Subjective   SUBJECTIVE/OBJECTIVE:  HPI   Pt is a of 78 y.o. male comes in today with   Chief Complaint   Patient presents with    Lower Back Pain     Patient complains of lower back pain that has been worsening over the past few months, is getting worse. Hurt right knee. Had to get a cane. Saw ortho and everything was ok. Lower back hurting for months before that. Worse in the am.  Loosens up a little. Getting worse over time. Worse after activity as well. Ice helps  No radiculopathy-pain in the middle of the back. LLQ pain. No specific triggers. Vitals:    10/28/21 0911   BP: (!) 140/84   Site: Left Upper Arm   Position: Sitting   Cuff Size: Medium Adult   Pulse: 72   SpO2: 97%   Weight: 173 lb (78.5 kg)   Height: 5' 4\" (1.626 m)        Review of Systems       Objective   Physical Exam  Constitutional:       General: He is not in acute distress. Appearance: He is well-developed. He is not diaphoretic. HENT:      Head: Normocephalic and atraumatic. Eyes:      General: No scleral icterus. Abdominal:      General: There is no distension.    Musculoskeletal:      Lumbar back: Spasms and bony tenderness present. Decreased range of motion. Skin:     General: Skin is warm and dry. Neurological:      Mental Status: He is alert. Cranial Nerves: No cranial nerve deficit. Sensory: No sensory deficit. Deep Tendon Reflexes:      Reflex Scores:       Patellar reflexes are 2+ on the right side and 2+ on the left side. Achilles reflexes are 2+ on the right side and 2+ on the left side. Psychiatric:         Behavior: Behavior normal.         Thought Content: Thought content normal.         Judgment: Judgment normal.              An electronic signature was used to authenticate this note.     --Luana Cuellar MD

## 2021-10-29 ENCOUNTER — TELEPHONE (OUTPATIENT)
Dept: FAMILY MEDICINE CLINIC | Age: 81
End: 2021-10-29

## 2021-11-22 ENCOUNTER — TELEPHONE (OUTPATIENT)
Dept: FAMILY MEDICINE CLINIC | Age: 81
End: 2021-11-22

## 2021-11-22 NOTE — TELEPHONE ENCOUNTER
Yashira Owens requesting a call regarding whether or not a fax was received from 11/18. I checked pt's chart and was unable to find anything.

## 2021-11-22 NOTE — TELEPHONE ENCOUNTER
Left message for Mary Carmen Rider to call back, I do not have anything for the patient - please refax.

## 2021-11-23 NOTE — TELEPHONE ENCOUNTER
Fax received this morning, on providers desk waiting for signature - PCP will be out of the office until Monday (please make Radha Napoles aware when she calls back).

## 2021-12-01 NOTE — TELEPHONE ENCOUNTER
Called back to state the fax did not go all the way through. Only received the 1 and 2 page.      Please refax

## 2021-12-27 RX ORDER — TAMSULOSIN HYDROCHLORIDE 0.4 MG/1
CAPSULE ORAL
Qty: 90 CAPSULE | Refills: 0 | Status: SHIPPED | OUTPATIENT
Start: 2021-12-27 | End: 2022-01-26 | Stop reason: SDUPTHER

## 2021-12-27 NOTE — TELEPHONE ENCOUNTER
Medication:   Requested Prescriptions     Pending Prescriptions Disp Refills    tamsulosin (FLOMAX) 0.4 MG capsule [Pharmacy Med Name: TAMSULOSIN 0.4MG CAPSULES] 90 capsule      Sig: TAKE 1 CAPSULE BY MOUTH DAILY        Last Filled:  07/23/21 # 180    Patient Phone Number: [phone] (home) [phone] (work)    Last appt: 10/28/2021   Next appt: 1/26/2022    Last OARRS: No flowsheet data found.

## 2022-01-21 DIAGNOSIS — E78.2 MIXED HYPERLIPIDEMIA: ICD-10-CM

## 2022-01-21 DIAGNOSIS — R73.03 PREDIABETES: ICD-10-CM

## 2022-01-21 LAB
A/G RATIO: 1.5 (ref 1.1–2.2)
ALBUMIN SERPL-MCNC: 4.3 G/DL (ref 3.4–5)
ALP BLD-CCNC: 113 U/L (ref 40–129)
ALT SERPL-CCNC: 23 U/L (ref 10–40)
ANION GAP SERPL CALCULATED.3IONS-SCNC: 12 MMOL/L (ref 3–16)
AST SERPL-CCNC: 31 U/L (ref 15–37)
BILIRUB SERPL-MCNC: 1 MG/DL (ref 0–1)
BUN BLDV-MCNC: 14 MG/DL (ref 7–20)
CALCIUM SERPL-MCNC: 9.5 MG/DL (ref 8.3–10.6)
CHLORIDE BLD-SCNC: 96 MMOL/L (ref 99–110)
CHOLESTEROL, TOTAL: 140 MG/DL (ref 0–199)
CO2: 26 MMOL/L (ref 21–32)
CREAT SERPL-MCNC: 0.9 MG/DL (ref 0.8–1.3)
GFR AFRICAN AMERICAN: >60
GFR NON-AFRICAN AMERICAN: >60
GLUCOSE BLD-MCNC: 99 MG/DL (ref 70–99)
HDLC SERPL-MCNC: 51 MG/DL (ref 40–60)
LDL CHOLESTEROL CALCULATED: 77 MG/DL
POTASSIUM SERPL-SCNC: 4.5 MMOL/L (ref 3.5–5.1)
SODIUM BLD-SCNC: 134 MMOL/L (ref 136–145)
TOTAL PROTEIN: 7.1 G/DL (ref 6.4–8.2)
TRIGL SERPL-MCNC: 58 MG/DL (ref 0–150)
VLDLC SERPL CALC-MCNC: 12 MG/DL

## 2022-01-22 LAB
ESTIMATED AVERAGE GLUCOSE: 125.5 MG/DL
HBA1C MFR BLD: 6 %

## 2022-01-26 ENCOUNTER — OFFICE VISIT (OUTPATIENT)
Dept: FAMILY MEDICINE CLINIC | Age: 82
End: 2022-01-26
Payer: MEDICARE

## 2022-01-26 VITALS
DIASTOLIC BLOOD PRESSURE: 84 MMHG | WEIGHT: 175 LBS | SYSTOLIC BLOOD PRESSURE: 134 MMHG | OXYGEN SATURATION: 98 % | BODY MASS INDEX: 29.88 KG/M2 | HEART RATE: 74 BPM | HEIGHT: 64 IN

## 2022-01-26 DIAGNOSIS — N13.8 BPH WITH OBSTRUCTION/LOWER URINARY TRACT SYMPTOMS: ICD-10-CM

## 2022-01-26 DIAGNOSIS — R73.03 PREDIABETES: ICD-10-CM

## 2022-01-26 DIAGNOSIS — E78.2 MIXED HYPERLIPIDEMIA: ICD-10-CM

## 2022-01-26 DIAGNOSIS — G89.29 CHRONIC MIDLINE LOW BACK PAIN WITHOUT SCIATICA: ICD-10-CM

## 2022-01-26 DIAGNOSIS — G56.02 LEFT CARPAL TUNNEL SYNDROME: Primary | ICD-10-CM

## 2022-01-26 DIAGNOSIS — M54.50 CHRONIC MIDLINE LOW BACK PAIN WITHOUT SCIATICA: ICD-10-CM

## 2022-01-26 DIAGNOSIS — N40.1 BPH WITH OBSTRUCTION/LOWER URINARY TRACT SYMPTOMS: ICD-10-CM

## 2022-01-26 PROCEDURE — 99214 OFFICE O/P EST MOD 30 MIN: CPT | Performed by: FAMILY MEDICINE

## 2022-01-26 RX ORDER — DUTASTERIDE 0.5 MG/1
0.5 CAPSULE, LIQUID FILLED ORAL DAILY
Qty: 90 CAPSULE | Refills: 1 | Status: SHIPPED | OUTPATIENT
Start: 2022-01-26 | End: 2022-04-20

## 2022-01-26 RX ORDER — TAMSULOSIN HYDROCHLORIDE 0.4 MG/1
0.8 CAPSULE ORAL DAILY
Qty: 180 CAPSULE | Refills: 1 | Status: SHIPPED | OUTPATIENT
Start: 2022-01-26 | End: 2022-05-24

## 2022-01-26 ASSESSMENT — PATIENT HEALTH QUESTIONNAIRE - PHQ9
SUM OF ALL RESPONSES TO PHQ9 QUESTIONS 1 & 2: 0
SUM OF ALL RESPONSES TO PHQ QUESTIONS 1-9: 0
1. LITTLE INTEREST OR PLEASURE IN DOING THINGS: 0
SUM OF ALL RESPONSES TO PHQ QUESTIONS 1-9: 0
5. POOR APPETITE OR OVEREATING: 0
SUM OF ALL RESPONSES TO PHQ QUESTIONS 1-9: 0
9. THOUGHTS THAT YOU WOULD BE BETTER OFF DEAD, OR OF HURTING YOURSELF: 0
3. TROUBLE FALLING OR STAYING ASLEEP: 0
7. TROUBLE CONCENTRATING ON THINGS, SUCH AS READING THE NEWSPAPER OR WATCHING TELEVISION: 0
6. FEELING BAD ABOUT YOURSELF - OR THAT YOU ARE A FAILURE OR HAVE LET YOURSELF OR YOUR FAMILY DOWN: 0
4. FEELING TIRED OR HAVING LITTLE ENERGY: 0
8. MOVING OR SPEAKING SO SLOWLY THAT OTHER PEOPLE COULD HAVE NOTICED. OR THE OPPOSITE, BEING SO FIGETY OR RESTLESS THAT YOU HAVE BEEN MOVING AROUND A LOT MORE THAN USUAL: 0
10. IF YOU CHECKED OFF ANY PROBLEMS, HOW DIFFICULT HAVE THESE PROBLEMS MADE IT FOR YOU TO DO YOUR WORK, TAKE CARE OF THINGS AT HOME, OR GET ALONG WITH OTHER PEOPLE: 0
SUM OF ALL RESPONSES TO PHQ QUESTIONS 1-9: 0
2. FEELING DOWN, DEPRESSED OR HOPELESS: 0

## 2022-01-26 NOTE — PROGRESS NOTES
Monika Burrell (:  1940) is a 78 y.o. male,Established patient, here for evaluation of the following chief complaint(s):  6 Month Follow-Up (Labs completed 22.)         ASSESSMENT/PLAN:  Serafin Briceño was seen today for 6 month follow-up. Diagnoses and all orders for this visit:    Left carpal tunnel syndrome  Will try night splint  BPH with obstruction/lower urinary tract symptoms  Increase to 2 flomax  Continue avodart  follow up with urology if no improvement. Prediabetes  -     Hemoglobin A1C; Future  -     Comprehensive Metabolic Panel; Future  Stable with diet  Mixed hyperlipidemia  -     Lipid Panel; Future  -     Comprehensive Metabolic Panel; Future  Stable on statin  Chronic midline low back pain without sciatica  Continue HEP. Stable but some psoas issues still  Other orders  -     tamsulosin (FLOMAX) 0.4 MG capsule; Take 2 capsules by mouth daily  -     dutasteride (AVODART) 0.5 MG capsule; Take 1 capsule by mouth daily         No follow-ups on file. Subjective   SUBJECTIVE/OBJECTIVE:  HPI   Pt is a of 78 y.o. male comes in today with   Chief Complaint   Patient presents with    6 Month Follow-Up     Labs completed 22. follow up predm and hypertension     still having trouble with urine frequency. On flomax and dutasteride. Numbness left 1st 3 1/2 finger. Worse at night. Low back better after PT   Lower abd discomfort however. Manages bank accounts and bills. No problems with that or error. Vitals:    22 0920   BP: 134/84   Site: Left Upper Arm   Position: Sitting   Cuff Size: Medium Adult   Pulse: 74   SpO2: 98%   Weight: 175 lb (79.4 kg)   Height: 5' 4\" (1.626 m)        Review of Systems       Objective   Physical Exam  Constitutional:       Appearance: Normal appearance. Cardiovascular:      Rate and Rhythm: Normal rate and regular rhythm. Pulmonary:      Effort: Pulmonary effort is normal.      Breath sounds: Normal breath sounds. Musculoskeletal:      Cervical back: No tenderness. Decreased range of motion. Neurological:      Mental Status: He is alert. An electronic signature was used to authenticate this note.     --Christie Manuel MD

## 2022-02-09 ENCOUNTER — OFFICE VISIT (OUTPATIENT)
Dept: FAMILY MEDICINE CLINIC | Age: 82
End: 2022-02-09
Payer: MEDICARE

## 2022-02-09 VITALS
DIASTOLIC BLOOD PRESSURE: 60 MMHG | BODY MASS INDEX: 30.05 KG/M2 | HEIGHT: 64 IN | SYSTOLIC BLOOD PRESSURE: 136 MMHG | HEART RATE: 65 BPM | OXYGEN SATURATION: 99 % | WEIGHT: 176 LBS

## 2022-02-09 DIAGNOSIS — R10.13 EPIGASTRIC PAIN: ICD-10-CM

## 2022-02-09 DIAGNOSIS — R10.13 EPIGASTRIC PAIN: Primary | ICD-10-CM

## 2022-02-09 LAB
A/G RATIO: 1.8 (ref 1.1–2.2)
ALBUMIN SERPL-MCNC: 4.1 G/DL (ref 3.4–5)
ALP BLD-CCNC: 96 U/L (ref 40–129)
ALT SERPL-CCNC: 20 U/L (ref 10–40)
ANION GAP SERPL CALCULATED.3IONS-SCNC: 10 MMOL/L (ref 3–16)
AST SERPL-CCNC: 29 U/L (ref 15–37)
BILIRUB SERPL-MCNC: 0.8 MG/DL (ref 0–1)
BUN BLDV-MCNC: 16 MG/DL (ref 7–20)
CALCIUM SERPL-MCNC: 9.1 MG/DL (ref 8.3–10.6)
CHLORIDE BLD-SCNC: 93 MMOL/L (ref 99–110)
CO2: 25 MMOL/L (ref 21–32)
CREAT SERPL-MCNC: 0.9 MG/DL (ref 0.8–1.3)
GFR AFRICAN AMERICAN: >60
GFR NON-AFRICAN AMERICAN: >60
GLUCOSE BLD-MCNC: 102 MG/DL (ref 70–99)
HCT VFR BLD CALC: 45.4 % (ref 40.5–52.5)
HEMOGLOBIN: 15.1 G/DL (ref 13.5–17.5)
LIPASE: 34 U/L (ref 13–60)
MCH RBC QN AUTO: 29.9 PG (ref 26–34)
MCHC RBC AUTO-ENTMCNC: 33.2 G/DL (ref 31–36)
MCV RBC AUTO: 90 FL (ref 80–100)
PDW BLD-RTO: 14.6 % (ref 12.4–15.4)
PLATELET # BLD: 302 K/UL (ref 135–450)
PMV BLD AUTO: 7.2 FL (ref 5–10.5)
POTASSIUM SERPL-SCNC: 5 MMOL/L (ref 3.5–5.1)
RBC # BLD: 5.04 M/UL (ref 4.2–5.9)
SODIUM BLD-SCNC: 128 MMOL/L (ref 136–145)
TOTAL PROTEIN: 6.4 G/DL (ref 6.4–8.2)
WBC # BLD: 7.2 K/UL (ref 4–11)

## 2022-02-09 PROCEDURE — 99213 OFFICE O/P EST LOW 20 MIN: CPT | Performed by: FAMILY MEDICINE

## 2022-02-09 NOTE — PROGRESS NOTES
Bradly Jaime (:  1940) is a 78 y.o. male,Established patient, here for evaluation of the following chief complaint(s):  Abdominal Cramping (Abdominal discomfort, \"esophagus feels weird,\" and belches but in his throat x 10 days - worse after meals.)         ASSESSMENT/PLAN:  Margo Chaves was seen today for abdominal cramping. Diagnoses and all orders for this visit:    Epigastric pain  -     CBC; Future  -     LIPASE; Future  -     Comprehensive Metabolic Panel; Future  -     US ABDOMEN COMPLETE; Future     blood work and PPI  ultrasound to evaluate  Advised to call back directly if there are further questions, or if these symptoms fail to improve as anticipated or worsen. No follow-ups on file. Subjective   SUBJECTIVE/OBJECTIVE:  HPI   Pt is a of 78 y.o. male comes in today with   Chief Complaint   Patient presents with    Abdominal Cramping     Abdominal discomfort, \"esophagus feels weird,\" and belches but in his throat x 10 days - worse after meals. mild discomfort in his stomach. No relation to po. Tired omeprazole last night and helped a little. No blood in stool or melena. Vitals:    22 0924   BP: 136/60   Site: Left Upper Arm   Position: Sitting   Cuff Size: Small Adult   Pulse: 65   SpO2: 99%   Weight: 176 lb (79.8 kg)   Height: 5' 4\" (1.626 m)        Review of Systems       Objective   Physical Exam  Constitutional:       Appearance: Normal appearance. Cardiovascular:      Rate and Rhythm: Normal rate. Pulmonary:      Effort: Pulmonary effort is normal.      Breath sounds: Normal breath sounds. Abdominal:      Comments: Mild epigastric abd pain   Neurological:      Mental Status: He is alert. An electronic signature was used to authenticate this note.     --Lori Toribio MD

## 2022-02-13 ENCOUNTER — APPOINTMENT (OUTPATIENT)
Dept: CT IMAGING | Age: 82
End: 2022-02-13
Payer: MEDICARE

## 2022-02-13 ENCOUNTER — HOSPITAL ENCOUNTER (EMERGENCY)
Age: 82
Discharge: HOME | End: 2022-02-13
Attending: EMERGENCY MEDICINE
Payer: MEDICARE

## 2022-02-13 ENCOUNTER — APPOINTMENT (OUTPATIENT)
Dept: GENERAL RADIOLOGY | Age: 82
End: 2022-02-13
Payer: MEDICARE

## 2022-02-13 VITALS
RESPIRATION RATE: 16 BRPM | SYSTOLIC BLOOD PRESSURE: 169 MMHG | HEART RATE: 67 BPM | OXYGEN SATURATION: 96 % | TEMPERATURE: 98.3 F | DIASTOLIC BLOOD PRESSURE: 97 MMHG

## 2022-02-13 DIAGNOSIS — S00.81XA ABRASION OF FACE, INITIAL ENCOUNTER: ICD-10-CM
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
## 2022-02-13 DIAGNOSIS — M25.561 ACUTE PAIN OF RIGHT KNEE: ICD-10-CM

## 2022-02-13 PROCEDURE — 6370000000 HC RX 637 (ALT 250 FOR IP): Performed by: PHYSICIAN ASSISTANT

## 2022-02-13 PROCEDURE — 99283 EMERGENCY DEPT VISIT LOW MDM: CPT

## 2022-02-13 PROCEDURE — 73560 X-RAY EXAM OF KNEE 1 OR 2: CPT

## 2022-02-13 PROCEDURE — 73610 X-RAY EXAM OF ANKLE: CPT

## 2022-02-13 PROCEDURE — 70450 CT HEAD/BRAIN W/O DYE: CPT

## 2022-02-13 PROCEDURE — 73590 X-RAY EXAM OF LOWER LEG: CPT

## 2022-02-13 RX ORDER — BACITRACIN ZINC AND POLYMYXIN B SULFATE 500; 1000 [USP'U]/G; [USP'U]/G
OINTMENT TOPICAL ONCE
Status: COMPLETED | OUTPATIENT
Start: 2022-02-13 | End: 2022-02-13

## 2022-02-13 RX ORDER — ACETAMINOPHEN 325 MG/1
650 TABLET ORAL ONCE
Status: COMPLETED | OUTPATIENT
Start: 2022-02-13 | End: 2022-02-13

## 2022-02-13 RX ADMIN — BACITRACIN ZINC AND POLYMYXIN B SULFATE: 500; 10000 OINTMENT TOPICAL at 11:33

## 2022-02-13 RX ADMIN — ACETAMINOPHEN 650 MG: 325 TABLET ORAL at 10:08

## 2022-02-13 ASSESSMENT — ENCOUNTER SYMPTOMS
COLOR CHANGE: 0
SHORTNESS OF BREATH: 0
ABDOMINAL PAIN: 0
CHEST TIGHTNESS: 0
VOMITING: 0
BACK PAIN: 0
NAUSEA: 0

## 2022-02-13 ASSESSMENT — PAIN SCALES - GENERAL: PAINLEVEL_OUTOF10: 1

## 2022-02-13 NOTE — ED PROVIDER NOTES
[address] [facility]          PHYSICIAN ASSISTANT NOTE       Date of evaluation: 2/13/2022    Chief Complaint     Fall (slipped on ice hit face, tip of nose bleeding) and Knee Pain (right knee pain/swelling)      History of Present Illness     Jesus Garcia is a 78 y.o. male past medical history listed below presenting to the emergency department after a fall. Patient states that he was walking in his driveway today when his right leg slipped on ice and went out from under him causing him to fall forward. He struck his face on the ground with no loss of consciousness. He sustained an abrasion to his nose and has had persistent bleeding from the tip of his nose since this accident occurred. No associated headache, vision changes, nausea or vomiting. He is not on blood thinners but does take a daily aspirin. He has no neck or back pain. Currently he does have pain and swelling in his right knee which he states feels unstable, specifically pain in the medial aspect of his knee. In addition he believes that he may have sprained his right ankle. History of multiple surgeries on his right knee following a knee replacement. He has not taken any medication for relief of his symptoms. Tetanus shot is up-to-date. Review of Systems     Review of Systems   Constitutional: Negative for chills, diaphoresis, fatigue and fever. Respiratory: Negative for chest tightness and shortness of breath. Cardiovascular: Negative for chest pain, palpitations and leg swelling. Gastrointestinal: Negative for abdominal pain, nausea and vomiting. Musculoskeletal: Negative for back pain and neck pain. Skin: Positive for wound. Negative for color change. Neurological: Negative for dizziness, tremors, syncope, weakness, light-headedness and headaches. Psychiatric/Behavioral: Negative for confusion. All other systems reviewed and are negative.       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 He has a past surgical history that includes hernia repair; Vasectomy; joint replacement; knee surgery (4/2012); Colonoscopy; Cardiac surgery (6/2012); knee surgery (3/2012); Aortic valve surgery; Mitral valve surgery; Total knee arthroplasty; Knee Prosthesis Removal (Left); and other surgical history (07/03/2016). His family history includes Cancer in his brother; Heart Disease in his father; Stroke in his father and mother. He reports that he has never smoked. He has never used smokeless tobacco. He reports current alcohol use of about 4.0 - 5.0 standard drinks of alcohol per week. He reports that he does not use drugs. Medications     Discharge Medication List as of 2/13/2022 11:52 AM      CONTINUE these medications which have NOT CHANGED    Details   tamsulosin (FLOMAX) 0.4 MG capsule Take 2 capsules by mouth daily, Disp-180 capsule, R-1Normal      dutasteride (AVODART) 0.5 MG capsule Take 1 capsule by mouth daily, Disp-90 capsule, R-1Normal      rosuvastatin (CRESTOR) 10 MG tablet Take 1 tablet by mouth daily, Disp-90 tablet, R-3Normal      aspirin 81 MG tablet Take 81 mg by mouth dailyHistorical Med      Flaxseed, Linseed, 1000 MG CAPS Take 1 capsule by mouth 2 times daily. multivitamin (THERAGRAN) per tablet Take 1 tablet by mouth daily. Psyllium (METAMUCIL PO) Take 2 Units by mouth daily. 2 TSP.              Allergies     He is allergic to demerol, oxycodone hcl, and meperidine. Physical Exam     INITIAL VITALS: BP: (!) 169/97, Temp: 98.3 °F (36.8 °C), Pulse: 67, Resp: 16, SpO2: 96 %  Physical Exam  Vitals and nursing note reviewed. Constitutional:       General: He is not in acute distress. Appearance: Normal appearance. He is normal weight. He is not ill-appearing, toxic-appearing or diaphoretic. HENT:      Head: Normocephalic. Comments: Patient to the anterior surface of the nasal bridge, no septal hematoma, no bony crepitus or asymmetry  Eyes:      Extraocular Movements: Extraocular movements intact. Conjunctiva/sclera: Conjunctivae normal.      Pupils: Pupils are equal, round, and reactive to light. Cardiovascular:      Rate and Rhythm: Normal rate and regular rhythm. Pulses: Normal pulses. Heart sounds: No murmur heard. No friction rub. No gallop. Pulmonary:      Effort: Pulmonary effort is normal. No respiratory distress. Breath sounds: Normal breath sounds. Abdominal:      Tenderness: There is no abdominal tenderness. Musculoskeletal:         General: Normal range of motion. Cervical back: Normal range of motion. Comments: No midline spinal tenderness of the cervical, thoracic or lumbosacral spine  Moving all extremities spontaneously, negative logroll bilaterally  Swelling with palpable joint effusion of the right knee, extensor mechanism is intact, no patellar apprehension, pain in the knee with valgus strain with no laxity  No tenderness to palpation overlying the lateral malleolus of the right ankle, range of motion of the ankle normal, negative squeeze test overlying the proximal\fibula, skin is well-perfused with intact distal pulses   Neurological:      General: No focal deficit present. Mental Status: He is alert and oriented to person, place, and time. Cranial Nerves: No cranial nerve deficit.    Psychiatric:         Mood and Affect: Mood normal. Behavior: Behavior normal.       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 This is stable from MRI November 29, 2013. No evidence for acute intracranial process. No bleed or shift. XR ANKLE RIGHT (MIN 3 VIEWS)   Final Result   No evidence for acute osseous process status post right total knee replacement            2 views right knee      FINDINGS:      Status post right total knee replacement. Suggestion of fluid within the pseudo joint space. Slight fragmentation patella identified that articulates with the anterior prosthesis. IMPRESSION: Status post right total knee replacement with a slight fragmentation of the patella, recommend correlation with direct palpation. There is evidence of fluid within the pseudo joint space. 3 views of the right ankle      FINDINGS:      Ossific density identified within the plantar fascia. No cortical disruption or periosteal elevation. IMPRESSION:      Likely chronic plantar fasciitis               XR TIBIA FIBULA RIGHT (2 VIEWS)   Final Result   No evidence for acute osseous process status post right total knee replacement            2 views right knee      FINDINGS:      Status post right total knee replacement. Suggestion of fluid within the pseudo joint space. Slight fragmentation patella identified that articulates with the anterior prosthesis. IMPRESSION: Status post right total knee replacement with a slight fragmentation of the patella, recommend correlation with direct palpation. There is evidence of fluid within the pseudo joint space. 3 views of the right ankle      FINDINGS:      Ossific density identified within the plantar fascia. No cortical disruption or periosteal elevation.       IMPRESSION:      Likely chronic plantar fasciitis               XR KNEE RIGHT (1-2 VIEWS)   Final Result   No evidence for acute osseous process status post right total knee replacement            2 views right knee      FINDINGS:      Status post right total knee replacement. Suggestion of fluid within the pseudo joint space. Slight fragmentation patella identified that articulates with the anterior prosthesis. IMPRESSION: Status post right total knee replacement with a slight fragmentation of the patella, recommend correlation with direct palpation. There is evidence of fluid within the pseudo joint space. 3 views of the right ankle      FINDINGS:      Ossific density identified within the plantar fascia. No cortical disruption or periosteal elevation. IMPRESSION:      Likely chronic plantar fasciitis                 LABS:   No results found for this visit on 02/13/22. ED BEDSIDE ULTRASOUND:  None    RECENT VITALS:  BP: (!) 169/97, Temp: 98.3 °F (36.8 °C), Pulse: 67, Resp: 16, SpO2: 96 %     Procedures   None    ED Course     Nursing Notes, Past Medical Hx,Past Surgical Hx, Social Hx, Allergies, and Family Hx were reviewed. The patient was given the following medications:  Orders Placed This Encounter   Medications    acetaminophen (TYLENOL) tablet 650 mg    bacitracin-polymyxin b (POLYSPORIN) ointment     CONSULTS:  None    MEDICAL DECISION MAKING / ASSESSMENT / Conchis Reilly is a 78 y.o. male presenting to the emergency department for evaluation after mechanical fall this morning. Patient slipped on ice and sustained trauma to his right knee with an abrasion to the nasal bridge. Patient was well-appearing upon arrival, no acute distress. He is not on blood thinners, does take a daily aspirin, based off of age as well as history and mechanism of injury CT head was completed for further evaluation. Imaging study showed no traumatic abnormalities. He had a normal neuro exam, with no midline neck pain or back pain.   Only other traumatic findings noted clued his right knee with palpable joint

## 2022-02-16 ENCOUNTER — OFFICE VISIT (OUTPATIENT)
Dept: FAMILY MEDICINE CLINIC | Age: 82
End: 2022-02-16
Payer: MEDICARE

## 2022-02-16 VITALS
DIASTOLIC BLOOD PRESSURE: 76 MMHG | WEIGHT: 173.8 LBS | HEART RATE: 70 BPM | HEIGHT: 64 IN | SYSTOLIC BLOOD PRESSURE: 126 MMHG | OXYGEN SATURATION: 97 % | BODY MASS INDEX: 29.67 KG/M2

## 2022-02-16 DIAGNOSIS — S93.491D SPRAIN OF ANTERIOR TALOFIBULAR LIGAMENT OF RIGHT ANKLE, SUBSEQUENT ENCOUNTER: Primary | ICD-10-CM

## 2022-02-16 DIAGNOSIS — S00.81XA ABRASION, FACE W/O INFECTION: ICD-10-CM

## 2022-02-16 PROCEDURE — 99213 OFFICE O/P EST LOW 20 MIN: CPT | Performed by: FAMILY MEDICINE

## 2022-02-16 NOTE — PROGRESS NOTES
Vladimir Dewitt (:  1940) is a 78 y.o. male,Established patient, here for evaluation of the following chief complaint(s):  Follow-Up from [facility] ED  for fall, hit head - fell forward. Injured right knee and ankle, would like to discuss options for ankle injury.)         ASSESSMENT/PLAN:  Tanesha Bruno was seen today for follow-up from hospital.    Diagnoses and all orders for this visit:    Sprain of anterior talofibular ligament of right ankle, subsequent encounter  Ice, protection. PT as scheduled  Abrasion, face w/o infection  Wound care instructions reviewed  Call back if not healing as expected       No follow-ups on file. Subjective   SUBJECTIVE/OBJECTIVE:  HPI   Pt is a of 78 y.o. male comes in today with   Chief Complaint   Patient presents with    Follow-Up from [facility] ED  for fall, hit head - fell forward. Injured right knee and ankle, would like to discuss options for ankle injury. Jeffery Crenshaw . Hit nose, right knee and ankle. No preceding symptoms. No headache   No LOC. ED did xrays-negative  Saw ortho y/d  PT scheduled. Still having epigastric pain. No change with PPI    Review of Systems       Objective   Physical Exam     Abrasions on bride of nose  Edema and tenderness medial ankle    An electronic signature was used to authenticate this note.     --Jesus Ward MD

## 2022-02-17 ENCOUNTER — HOSPITAL ENCOUNTER (OUTPATIENT)
Dept: ULTRASOUND IMAGING | Age: 82
Discharge: HOME | End: 2022-02-17
Payer: MEDICARE

## 2022-02-17 DIAGNOSIS — R10.13 EPIGASTRIC PAIN: ICD-10-CM

## 2022-02-17 PROCEDURE — 76700 US EXAM ABDOM COMPLETE: CPT

## 2022-02-22 NOTE — ED PROVIDER NOTES
ED Attending Attestation Note     Date of evaluation: 2/13/2022    This patient was seen by the YULY. I have seen and examined the patient, agree with the workup, evaluation, management and diagnosis. The care plan has been discussed. I was present for any procedures performed in the YULY's note and have made edits to the note where appropriate. My assessment reveals 78 y.o. male presenting for a trip and fall on the ice in his driveway. He has an abrasion to his nose without gross deformity. Some mild tenderness of the right knee without gross deformity and small effusion. No tenderness of logroll of hips bilaterally.        Bebe Marie MD  02/22/22 [id number]

## 2022-03-09 RX ORDER — OSELTAMIVIR PHOSPHATE 75 MG/1
75 CAPSULE ORAL DAILY
Qty: 10 CAPSULE | Refills: 0 | Status: SHIPPED | OUTPATIENT
Start: 2022-03-09 | End: 2022-03-19

## 2022-04-20 RX ORDER — DUTASTERIDE 0.5 MG/1
0.5 CAPSULE, LIQUID FILLED ORAL DAILY
Qty: 90 CAPSULE | Refills: 1 | Status: SHIPPED | OUTPATIENT
Start: 2022-04-20 | End: 2022-10-10 | Stop reason: SDUPTHER

## 2022-04-20 NOTE — TELEPHONE ENCOUNTER
Medication:   Requested Prescriptions     Pending Prescriptions Disp Refills    dutasteride (AVODART) 0.5 MG capsule [Pharmacy Med Name: DUTASTERIDE 0.5MG CAPSULES] 90 capsule 1     Sig: TAKE 1 CAPSULE BY MOUTH DAILY        Last Filled:  01/26/2022    Patient Phone Number: [phone] (home) [phone] (work)    Last appt: 2/16/2022   Next appt: 5/9/2022    Last OARRS: No flowsheet data found.

## 2022-04-27 DIAGNOSIS — R73.03 PREDIABETES: ICD-10-CM

## 2022-04-27 DIAGNOSIS — E78.2 MIXED HYPERLIPIDEMIA: ICD-10-CM

## 2022-04-27 LAB
A/G RATIO: 1.7 (ref 1.1–2.2)
ALBUMIN SERPL-MCNC: 4.2 G/DL (ref 3.4–5)
ALP BLD-CCNC: 121 U/L (ref 40–129)
ALT SERPL-CCNC: 20 U/L (ref 10–40)
ANION GAP SERPL CALCULATED.3IONS-SCNC: 11 MMOL/L (ref 3–16)
AST SERPL-CCNC: 32 U/L (ref 15–37)
BILIRUB SERPL-MCNC: 0.8 MG/DL (ref 0–1)
BUN BLDV-MCNC: 10 MG/DL (ref 7–20)
CALCIUM SERPL-MCNC: 9.2 MG/DL (ref 8.3–10.6)
CHLORIDE BLD-SCNC: 96 MMOL/L (ref 99–110)
CHOLESTEROL, TOTAL: 138 MG/DL (ref 0–199)
CO2: 26 MMOL/L (ref 21–32)
CREAT SERPL-MCNC: 0.9 MG/DL (ref 0.8–1.3)
GFR AFRICAN AMERICAN: >60
GFR NON-AFRICAN AMERICAN: >60
GLUCOSE BLD-MCNC: 93 MG/DL (ref 70–99)
HDLC SERPL-MCNC: 49 MG/DL (ref 40–60)
LDL CHOLESTEROL CALCULATED: 79 MG/DL
POTASSIUM SERPL-SCNC: 5.1 MMOL/L (ref 3.5–5.1)
SODIUM BLD-SCNC: 133 MMOL/L (ref 136–145)
TOTAL PROTEIN: 6.7 G/DL (ref 6.4–8.2)
TRIGL SERPL-MCNC: 51 MG/DL (ref 0–150)
VLDLC SERPL CALC-MCNC: 10 MG/DL

## 2022-04-28 LAB
ESTIMATED AVERAGE GLUCOSE: 131.2 MG/DL
HBA1C MFR BLD: 6.2 %

## 2022-05-02 SDOH — HEALTH STABILITY: PHYSICAL HEALTH: ON AVERAGE, HOW MANY DAYS PER WEEK DO YOU ENGAGE IN MODERATE TO STRENUOUS EXERCISE (LIKE A BRISK WALK)?: 4 DAYS

## 2022-05-02 SDOH — HEALTH STABILITY: PHYSICAL HEALTH: ON AVERAGE, HOW MANY MINUTES DO YOU ENGAGE IN EXERCISE AT THIS LEVEL?: 30 MIN

## 2022-05-02 ASSESSMENT — LIFESTYLE VARIABLES
HAS A RELATIVE, FRIEND, DOCTOR, OR ANOTHER HEALTH PROFESSIONAL EXPRESSED CONCERN ABOUT YOUR DRINKING OR SUGGESTED YOU CUT DOWN: NO
HOW MANY STANDARD DRINKS CONTAINING ALCOHOL DO YOU HAVE ON A TYPICAL DAY: 1 OR 2
HOW OFTEN DO YOU HAVE A DRINK CONTAINING ALCOHOL: 4 OR MORE TIMES A WEEK
HAVE YOU OR SOMEONE ELSE BEEN INJURED AS A RESULT OF YOUR DRINKING: NO
HOW OFTEN DURING THE LAST YEAR HAVE YOU FAILED TO DO WHAT WAS NORMALLY EXPECTED FROM YOU BECAUSE OF DRINKING: NEVER
HOW OFTEN DURING THE LAST YEAR HAVE YOU FOUND THAT YOU WERE NOT ABLE TO STOP DRINKING ONCE YOU HAD STARTED: NEVER
HAS A RELATIVE, FRIEND, DOCTOR, OR ANOTHER HEALTH PROFESSIONAL EXPRESSED CONCERN ABOUT YOUR DRINKING OR SUGGESTED YOU CUT DOWN: 0
HOW OFTEN DURING THE LAST YEAR HAVE YOU FOUND THAT YOU WERE NOT ABLE TO STOP DRINKING ONCE YOU HAD STARTED: 0
HOW OFTEN DURING THE LAST YEAR HAVE YOU HAD A FEELING OF GUILT OR REMORSE AFTER DRINKING: NEVER
HOW OFTEN DURING THE LAST YEAR HAVE YOU BEEN UNABLE TO REMEMBER WHAT HAPPENED THE NIGHT BEFORE BECAUSE YOU HAD BEEN DRINKING: 0
HOW OFTEN DURING THE LAST YEAR HAVE YOU FAILED TO DO WHAT WAS NORMALLY EXPECTED FROM YOU BECAUSE OF DRINKING: 0
HOW OFTEN DURING THE LAST YEAR HAVE YOU BEEN UNABLE TO REMEMBER WHAT HAPPENED THE NIGHT BEFORE BECAUSE YOU HAD BEEN DRINKING: NEVER
HOW OFTEN DO YOU HAVE A DRINK CONTAINING ALCOHOL: 5
HOW OFTEN DURING THE LAST YEAR HAVE YOU HAD A FEELING OF GUILT OR REMORSE AFTER DRINKING: 0
HOW OFTEN DURING THE LAST YEAR HAVE YOU NEEDED AN ALCOHOLIC DRINK FIRST THING IN THE MORNING TO GET YOURSELF GOING AFTER A NIGHT OF HEAVY DRINKING: 0
HOW MANY STANDARD DRINKS CONTAINING ALCOHOL DO YOU HAVE ON A TYPICAL DAY: 1
HOW OFTEN DO YOU HAVE SIX OR MORE DRINKS ON ONE OCCASION: 1
HAVE YOU OR SOMEONE ELSE BEEN INJURED AS A RESULT OF YOUR DRINKING: 0
HOW OFTEN DURING THE LAST YEAR HAVE YOU NEEDED AN ALCOHOLIC DRINK FIRST THING IN THE MORNING TO GET YOURSELF GOING AFTER A NIGHT OF HEAVY DRINKING: NEVER

## 2022-05-02 ASSESSMENT — PATIENT HEALTH QUESTIONNAIRE - PHQ9
SUM OF ALL RESPONSES TO PHQ QUESTIONS 1-9: 0
SUM OF ALL RESPONSES TO PHQ QUESTIONS 1-9: 0
1. LITTLE INTEREST OR PLEASURE IN DOING THINGS: 0
SUM OF ALL RESPONSES TO PHQ QUESTIONS 1-9: 0
SUM OF ALL RESPONSES TO PHQ9 QUESTIONS 1 & 2: 0
SUM OF ALL RESPONSES TO PHQ QUESTIONS 1-9: 0
2. FEELING DOWN, DEPRESSED OR HOPELESS: 0

## 2022-05-09 ENCOUNTER — OFFICE VISIT (OUTPATIENT)
Dept: FAMILY MEDICINE CLINIC | Age: 82
End: 2022-05-09
Payer: MEDICARE

## 2022-05-09 VITALS
DIASTOLIC BLOOD PRESSURE: 80 MMHG | WEIGHT: 171 LBS | HEART RATE: 104 BPM | SYSTOLIC BLOOD PRESSURE: 128 MMHG | OXYGEN SATURATION: 96 % | BODY MASS INDEX: 29.19 KG/M2 | HEIGHT: 64 IN

## 2022-05-09 DIAGNOSIS — Z00.00 MEDICARE ANNUAL WELLNESS VISIT, SUBSEQUENT: Primary | ICD-10-CM

## 2022-05-09 PROCEDURE — G0439 PPPS, SUBSEQ VISIT: HCPCS | Performed by: FAMILY MEDICINE

## 2022-05-09 NOTE — PROGRESS NOTES
Medicare Annual Wellness Visit    Radhika Hussein is here for Medicare AWV (Labs completed 4/27/22, here for AWV.)    Assessment & Plan   Medicare annual wellness visit, subsequent      Recommendations for Preventive Services Due: see orders and patient instructions/AVS.  Recommended screening schedule for the next 5-10 years is provided to the patient in written form: see Patient Instructions/AVS.     Return for Medicare Annual Wellness Visit in 1 year. Subjective       Patient's complete Health Risk Assessment and screening values have been reviewed and are found in Flowsheets. The following problems were reviewed today and where indicated follow up appointments were made and/or referrals ordered. Some urgency with urine   On meds  Declined urology referral.     Positive Risk Factor Screenings with Interventions:  No opioid use    Fall Risk:  Do you feel unsteady or are you worried about falling? : no  2 or more falls in past year?: no  Fall with injury in past year?: (!) yes     Fall Risk Interventions:    · Home safety tips provided    Cognitive: Words recalled: 2 Words Recalled  Total Score Interpretation: Abnormal Mini-Cog  Did the patient refuse to take the cognition test?: No    Cognitive Impairment Interventions:  · Patient declines any further evaluation/treatment for cognitive impairment                      Objective   Vitals:    05/09/22 0924   BP: 128/80   Site: Left Upper Arm   Position: Sitting   Cuff Size: Small Adult   Pulse: 104   SpO2: 96%   Weight: 171 lb (77.6 kg)   Height: 5' 4\" (1.626 m)      Body mass index is 29.35 kg/m².         General Appearance: alert and oriented to person, place and time, well-developed and well-nourished, in no acute distress  Skin: warm and dry, no rash or erythema  Pulmonary/Chest: clear to auscultation bilaterally- no wheezes, rales or rhonchi, normal air movement, no respiratory distress  Cardiovascular: normal rate, normal S1 and S2, no gallops, intact distal pulses and no carotid bruits       Allergies   Allergen Reactions    Demerol     Oxycodone Hcl     Meperidine Nausea And Vomiting     Prior to Visit Medications    Medication Sig Taking? Authorizing Provider   dutasteride (AVODART) 0.5 MG capsule TAKE 1 CAPSULE BY MOUTH DAILY Yes ANUM Richardson CNP   tamsulosin (FLOMAX) 0.4 MG capsule Take 2 capsules by mouth daily Yes Wendy Reyes MD   rosuvastatin (CRESTOR) 10 MG tablet Take 1 tablet by mouth daily Yes Wendy Reyes MD   aspirin 81 MG tablet Take 81 mg by mouth daily Yes Historical Provider, MD   Flaxseed Linseed, 1000 MG CAPS Take 1 capsule by mouth 2 times daily. Yes Historical Provider, MD   multivitamin [facility]) per tablet Take 1 tablet by mouth daily. Yes Historical Provider, MD   Psyllium (METAMUCIL PO) Take 2 Units by mouth daily. 2 TSP.  Yes Historical Provider, MD       CareTeam (Including outside providers/suppliers regularly involved in providing care):   Patient Care Team:  Wendy Reyes MD as PCP - General (Family Medicine)  Wendy Reyes MD as PCP - [facility] Empaneled Provider  Griffin Mares PSYD (Psychology)  Nusrat Son MD as Consulting Physician (Cardiology)    Reviewed and updated this visit:  Tobacco  Allergies  Meds  Med Hx  Surg Hx  Soc Hx  Fam Hx

## 2022-05-09 NOTE — PATIENT INSTRUCTIONS
Personalized Preventive Plan for Espinoza Mariano - 5/9/2022  Medicare offers a range of preventive health benefits. Some of the tests and screenings are paid in full while other may be subject to a deductible, co-insurance, and/or copay. Some of these benefits include a comprehensive review of your medical history including lifestyle, illnesses that may run in your family, and various assessments and screenings as appropriate. After reviewing your medical record and screening and assessments performed today your provider may have ordered immunizations, labs, imaging, and/or referrals for you. A list of these orders (if applicable) as well as your Preventive Care list are included within your After Visit Summary for your review. Other Preventive Recommendations:    · A preventive eye exam performed by an eye specialist is recommended every 1-2 years to screen for glaucoma; cataracts, macular degeneration, and other eye disorders. · A preventive dental visit is recommended every 6 months. · Try to get at least 150 minutes of exercise per week or 10,000 steps per day on a pedometer . · Order or download the FREE \"Exercise & Physical Activity: Your Everyday Guide\" from [employer]. Call 2-766.414.8129 or search [employer] online. · You need 9711-1469 mg of calcium and 3455-8633 IU of vitamin D per day. It is possible to meet your calcium requirement with diet alone, but a vitamin D supplement is usually necessary to meet this goal.  · When exposed to the sun, use a sunscreen that protects against both UVA and UVB radiation with an SPF of 30 or greater. Reapply every 2 to 3 hours or after sweating, drying off with a towel, or swimming. · Always wear a seat belt when traveling in a car. Always wear a helmet when riding a bicycle or motorcycle.

## 2022-05-24 RX ORDER — TAMSULOSIN HYDROCHLORIDE 0.4 MG/1
0.8 CAPSULE ORAL DAILY
Qty: 180 CAPSULE | Refills: 1 | Status: SHIPPED | OUTPATIENT
Start: 2022-05-24 | End: 2022-08-19 | Stop reason: SDUPTHER

## 2022-05-24 NOTE — TELEPHONE ENCOUNTER
Medication:   Requested Prescriptions     Pending Prescriptions Disp Refills    tamsulosin (FLOMAX) 0.4 MG capsule [Pharmacy Med Name: TAMSULOSIN 0.4MG CAPSULES] 180 capsule 1     Sig: TAKE 2 CAPSULES BY MOUTH DAILY        Last Filled:  1/26/22    Patient Phone Number: [phone] (home) [phone] (work)    Last appt: 5/9/2022   Next appt: 8/12/2022    Last OARRS: No flowsheet data found.

## 2022-08-19 ENCOUNTER — OFFICE VISIT (OUTPATIENT)
Dept: FAMILY MEDICINE CLINIC | Age: 82
End: 2022-08-19
Payer: MEDICARE

## 2022-08-19 VITALS
HEIGHT: 64 IN | SYSTOLIC BLOOD PRESSURE: 110 MMHG | OXYGEN SATURATION: 98 % | HEART RATE: 62 BPM | WEIGHT: 167 LBS | BODY MASS INDEX: 28.51 KG/M2 | DIASTOLIC BLOOD PRESSURE: 80 MMHG

## 2022-08-19 DIAGNOSIS — E78.2 MIXED HYPERLIPIDEMIA: Primary | ICD-10-CM

## 2022-08-19 DIAGNOSIS — N13.8 BPH WITH OBSTRUCTION/LOWER URINARY TRACT SYMPTOMS: ICD-10-CM

## 2022-08-19 DIAGNOSIS — R73.03 PREDIABETES: ICD-10-CM

## 2022-08-19 DIAGNOSIS — N40.1 BPH WITH OBSTRUCTION/LOWER URINARY TRACT SYMPTOMS: ICD-10-CM

## 2022-08-19 PROCEDURE — 99214 OFFICE O/P EST MOD 30 MIN: CPT | Performed by: FAMILY MEDICINE

## 2022-08-19 PROCEDURE — 1123F ACP DISCUSS/DSCN MKR DOCD: CPT | Performed by: FAMILY MEDICINE

## 2022-08-19 RX ORDER — TAMSULOSIN HYDROCHLORIDE 0.4 MG/1
0.8 CAPSULE ORAL DAILY
Qty: 180 CAPSULE | Refills: 3 | Status: SHIPPED | OUTPATIENT
Start: 2022-08-19

## 2022-08-19 SDOH — ECONOMIC STABILITY: FOOD INSECURITY: WITHIN THE PAST 12 MONTHS, YOU WORRIED THAT YOUR FOOD WOULD RUN OUT BEFORE YOU GOT MONEY TO BUY MORE.: NEVER TRUE

## 2022-08-19 SDOH — ECONOMIC STABILITY: FOOD INSECURITY: WITHIN THE PAST 12 MONTHS, THE FOOD YOU BOUGHT JUST DIDN'T LAST AND YOU DIDN'T HAVE MONEY TO GET MORE.: NEVER TRUE

## 2022-08-19 ASSESSMENT — SOCIAL DETERMINANTS OF HEALTH (SDOH): HOW HARD IS IT FOR YOU TO PAY FOR THE VERY BASICS LIKE FOOD, HOUSING, MEDICAL CARE, AND HEATING?: NOT HARD AT ALL

## 2022-08-19 NOTE — PROGRESS NOTES
Dayna Liao (:  1940) is a 78 y.o. male,Established patient, here for evaluation of the following chief complaint(s):  Follow-up         ASSESSMENT/PLAN:  Marissa Herrera was seen today for follow-up. Diagnoses and all orders for this visit:    Mixed hyperlipidemia  Stable on statin  Prediabetes  Stable with diet  BPH with obstruction/lower urinary tract symptoms  Stable with flomax and avodart  Other orders  -     tamsulosin (FLOMAX) 0.4 MG capsule; Take 2 capsules by mouth daily       No follow-ups on file. Subjective   SUBJECTIVE/OBJECTIVE:  HPI  Pt is a of 78 y.o. male comes in today with   Chief Complaint   Patient presents with    Follow-up   follow up bph, hyperlipidemia, predm  Stable on current meds. Urine symptoms stable. Vitals:    22 0934   BP: 110/80   Site: Left Upper Arm   Position: Sitting   Cuff Size: Medium Adult   Pulse: 62   SpO2: 98%   Weight: 167 lb (75.8 kg)   Height: 5' 4\" (1.626 m)      Review of Systems       Objective   Physical Exam  Constitutional:       Appearance: Normal appearance. He is well-developed. HENT:      Head: Normocephalic and atraumatic. Mouth/Throat:      Pharynx: No oropharyngeal exudate. Eyes:      Conjunctiva/sclera: Conjunctivae normal.   Neck:      Thyroid: No thyromegaly. Vascular: No carotid bruit. Cardiovascular:      Rate and Rhythm: Normal rate and regular rhythm. Heart sounds: Normal heart sounds. No murmur heard. Pulmonary:      Effort: Pulmonary effort is normal.      Breath sounds: Normal breath sounds. No rales. Musculoskeletal:      Cervical back: Neck supple. Lymphadenopathy:      Cervical: No cervical adenopathy. Skin:     General: Skin is warm and dry. Nails: There is no clubbing. Neurological:      Mental Status: He is alert and oriented to person, place, and time. Cranial Nerves: No cranial nerve deficit. An electronic signature was used to authenticate this note.     --Sheldon Mckeon Prieto Valadez MD

## 2022-08-22 NOTE — TELEPHONE ENCOUNTER
Medication:   Requested Prescriptions     Pending Prescriptions Disp Refills    rosuvastatin (CRESTOR) 10 MG tablet [Pharmacy Med Name: ROSUVASTATIN 10MG TABLETS] 90 tablet 3     Sig: TAKE 1 TABLET BY MOUTH EVERY DAY        Last Filled: 7/23/2021   Last appt: 8/19/2022   Next appt: 2/17/2023

## 2022-08-23 RX ORDER — ROSUVASTATIN CALCIUM 10 MG/1
TABLET, COATED ORAL
Qty: 90 TABLET | Refills: 3 | Status: SHIPPED | OUTPATIENT
Start: 2022-08-23

## 2022-08-30 ENCOUNTER — ANESTHESIA EVENT (OUTPATIENT)
Dept: ENDOSCOPY | Age: 82
End: 2022-08-30
Payer: MEDICARE

## 2022-08-30 NOTE — PROGRESS NOTES
ENDOSCOPY PREOP INSTRUCTIONS      You are scheduled for a procedure at [facility] on 8-31 @ 1015. You will need to arrival by: 845 (at least an hour & a half prior to planned start time)  Report to the MAIN entrance on [address] and register at the surgery center on the left-hand side of the lobby  You will need your insurance card and photo id    For your procedure:     PLEASE FOLLOW ALL INSTRUCTIONS & PREPS GIVEN TO YOU BY YOUR DOCTOR'S OFFICE. If you have not received these instructions yet, please call the office immediately. Make sure to read them as soon as received. Bring an accurate list of any medications, including the dose/ frequency, with you on the day of the procedure. Make sure to include over the counter medications. If you are taking blood thinners, Aspirin or diabetic medication, make sure to call your doctor as soon as possible for instructions prior to your procedure. Please dress comfortably and do not wear any lotion, powders or jewelry  Arrange for someone to be with you and sign you out & drive you home after your procedure. We allow 2 adults visitors with you in the hospital & everyone is required to wear a mask.     WOMEN ONLY OF CHILDBEARING AGE: Please make sure to be able to give a urine sample on arrival      If you have further questions, you may contact your Endoscopist's office or Pre Admission Testing staff at [phone]

## 2022-08-31 ENCOUNTER — ANESTHESIA (OUTPATIENT)
Dept: ENDOSCOPY | Age: 82
End: 2022-08-31
Payer: MEDICARE

## 2022-08-31 ENCOUNTER — HOSPITAL ENCOUNTER (OUTPATIENT)
Age: 82
Setting detail: OUTPATIENT SURGERY
Discharge: HOME | End: 2022-08-31
Attending: INTERNAL MEDICINE | Admitting: INTERNAL MEDICINE
Payer: MEDICARE

## 2022-08-31 VITALS
RESPIRATION RATE: 18 BRPM | TEMPERATURE: 97.2 F | HEART RATE: 60 BPM | BODY MASS INDEX: 28.17 KG/M2 | HEIGHT: 64 IN | OXYGEN SATURATION: 96 % | SYSTOLIC BLOOD PRESSURE: 110 MMHG | WEIGHT: 165 LBS | DIASTOLIC BLOOD PRESSURE: 74 MMHG

## 2022-08-31 DIAGNOSIS — R10.30 LOWER ABDOMINAL PAIN: ICD-10-CM

## 2022-08-31 DIAGNOSIS — R10.13 DYSPEPSIA: ICD-10-CM

## 2022-08-31 PROCEDURE — 3609012400 HC EGD TRANSORAL BIOPSY SINGLE/MULTIPLE: Performed by: INTERNAL MEDICINE

## 2022-08-31 PROCEDURE — 3609010600 HC COLONOSCOPY POLYPECTOMY SNARE/COLD BIOPSY: Performed by: INTERNAL MEDICINE

## 2022-08-31 PROCEDURE — 7100000010 HC PHASE II RECOVERY - FIRST 15 MIN: Performed by: INTERNAL MEDICINE

## 2022-08-31 PROCEDURE — 3700000000 HC ANESTHESIA ATTENDED CARE: Performed by: INTERNAL MEDICINE

## 2022-08-31 PROCEDURE — 88305 TISSUE EXAM BY PATHOLOGIST: CPT

## 2022-08-31 PROCEDURE — 7100000011 HC PHASE II RECOVERY - ADDTL 15 MIN: Performed by: INTERNAL MEDICINE

## 2022-08-31 PROCEDURE — 3609019800 HC COLONOSCOPY WITH SUBMUCOSAL INJECTION: Performed by: INTERNAL MEDICINE

## 2022-08-31 PROCEDURE — 2580000003 HC RX 258: Performed by: FAMILY MEDICINE

## 2022-08-31 PROCEDURE — 2709999900 HC NON-CHARGEABLE SUPPLY: Performed by: INTERNAL MEDICINE

## 2022-08-31 PROCEDURE — 6360000002 HC RX W HCPCS: Performed by: NURSE ANESTHETIST, CERTIFIED REGISTERED

## 2022-08-31 PROCEDURE — 3609010300 HC COLONOSCOPY W/BIOPSY SINGLE/MULTIPLE: Performed by: INTERNAL MEDICINE

## 2022-08-31 PROCEDURE — 3700000001 HC ADD 15 MINUTES (ANESTHESIA): Performed by: INTERNAL MEDICINE

## 2022-08-31 RX ORDER — SODIUM CHLORIDE, SODIUM LACTATE, POTASSIUM CHLORIDE, CALCIUM CHLORIDE 600; 310; 30; 20 MG/100ML; MG/100ML; MG/100ML; MG/100ML
INJECTION, SOLUTION INTRAVENOUS CONTINUOUS
Status: DISCONTINUED | OUTPATIENT
Start: 2022-08-31 | End: 2022-08-31 | Stop reason: HOSPADM

## 2022-08-31 RX ORDER — PROPOFOL 10 MG/ML
INJECTION, EMULSION INTRAVENOUS CONTINUOUS PRN
Status: DISCONTINUED | OUTPATIENT
Start: 2022-08-31 | End: 2022-08-31 | Stop reason: SDUPTHER

## 2022-08-31 RX ORDER — SODIUM CHLORIDE 9 MG/ML
INJECTION, SOLUTION INTRAVENOUS PRN
Status: DISCONTINUED | OUTPATIENT
Start: 2022-08-31 | End: 2022-08-31 | Stop reason: HOSPADM

## 2022-08-31 RX ORDER — SODIUM CHLORIDE 0.9 % (FLUSH) 0.9 %
5-40 SYRINGE (ML) INJECTION EVERY 12 HOURS SCHEDULED
Status: DISCONTINUED | OUTPATIENT
Start: 2022-08-31 | End: 2022-08-31 | Stop reason: HOSPADM

## 2022-08-31 RX ORDER — SODIUM CHLORIDE 0.9 % (FLUSH) 0.9 %
5-40 SYRINGE (ML) INJECTION PRN
Status: DISCONTINUED | OUTPATIENT
Start: 2022-08-31 | End: 2022-08-31 | Stop reason: HOSPADM

## 2022-08-31 RX ADMIN — PROPOFOL 150 MCG/KG/MIN: 10 INJECTION, EMULSION INTRAVENOUS at 11:05

## 2022-08-31 RX ADMIN — SODIUM CHLORIDE, POTASSIUM CHLORIDE, SODIUM LACTATE AND CALCIUM CHLORIDE: 600; 310; 30; 20 INJECTION, SOLUTION INTRAVENOUS at 10:00

## 2022-08-31 ASSESSMENT — PAIN SCALES - WONG BAKER: WONGBAKER_NUMERICALRESPONSE: 0

## 2022-08-31 ASSESSMENT — PAIN SCALES - GENERAL
PAINLEVEL_OUTOF10: 0
PAINLEVEL_OUTOF10: 0

## 2022-08-31 ASSESSMENT — PAIN - FUNCTIONAL ASSESSMENT: PAIN_FUNCTIONAL_ASSESSMENT: NONE - DENIES PAIN

## 2022-08-31 NOTE — PROCEDURES
EGD/COLONOSCOPY PROCEDURE NOTE:        Patient: Logan Brooke  : 1940  Acct#:     Procedure:   Esophagogastroduodenoscopy with biopsy  Colonoscopy with biopsy, polypectomy (cold snare)        Date:  2022     Surgeon:  Dudley Salmeron MD,     Referring Physician:  Anju Mckinley MD      Preoperative Diagnosis:    Chronic reflux, dyspepsia, generalized abdominal pain  Screening colonoscopy with history of colon polyps      Anesthesia: MAC anesthesia      Consent:  The patient or their legal guardian has signed an informed consent, and is aware of the potential risks, benefits, alternatives, and potential complications of this procedure. These include, but are not limited to hemorrhage, bleeding, post procedural pain, perforation, phlebitis, aspiration, hypotension, hypoxia, cardiovascular events such as arryhthmia, and possibly death. EGD PROCEDURE NOTE      Description of Procedure: The patient was then taken to the endoscopy suite, placed in the left lateral decubitus position and the above IV sedation was administrered. The Olympus video endoscope was placed through the patient's oropharynx without difficulty to the extent of the 2nd portion of the duodenum. The patient tolerated the procedure well and was taken to the post anesthesia care unit in good condition. Complications: None  EBL: none      Findings:  Esophagus:  Visualization of the esophagus demonstrated normal appearing mucosa. GE junction at 40 cm. Stomach: The scope was then advanced into the stomach. Both forward and retroflexed views of the stomach were obtained. Visualization of the gastric body and antrum demonstrated mild erythema, biopsies obtained. A retroflexed exam of the gastric cardia and fundus demonstrated normal mucosa. The pylorus was patent and the scope was advanced into the duodenum.   Duodenum: Visualization of the duodenal bulb and the second portion of the duodenum demonstrated normal mucosa. The scope was then withdrawn back into the stomach, it was decompressed, and the scope was completely withdrawn. Impression:    Antral gastritis            COLONOSCOPY PROCEDURE NOTE      Procedure description:   An informed consent was obtained from the patient after explanation of indications, benefits, possible risks and complications of the procedure. The patient was then taken to the endoscopy suite, placed in the left lateral decubitus position, and the above IV anesthesia was administered. A digital rectal examination was performed and revealed negative without mass, lesions or tenderness. The Olympus video colonoscope was placed in the patient's rectum under digital direction and advanced to the cecum. The cecum was identified by IC valve and appendiceal orifice. The prep was fair. The ileocecal valve was identified and  intubated. The scope was then withdrawn back through the cecum, ascending, transverse, descending and sigmoid colons. Careful circumferential examination of the mucosa was performed. The scope was then withdrawn into the rectum and retroflexed. The scope was straightened, the colon was decompressed and the scope was withdrawn from the patient. The patient tolerated the procedure well and was taken to the recovery room in good condition. Complications: none  EBL: None    Findings:  The prep was suboptimal at few places which was washed and suctioned. Severe pandiverticulosis seen, especially in the left colon. 5 mm polyp in the ascending colon, removed by cold snare polypectomy. 5 mm polyp in the descending colon near a previously placed tattoo nicole, removed by cold snare polypectomy. A large 4 to 5 cm circumferential villous appearing polypoid mass seen starting at 12 cm in the proximal rectum extending into the distal sigmoid/rectosigmoid junction. Multiple biopsies were obtained. [place] ink tattoo was injected distal to the lesion.      Retroflexion in the rectum revealed small internal hemorrhoids. Impression:    A large 4 to 5 cm circumferential villous appearing polypoid mass seen starting at 12 cm in the proximal rectum extending into the distal sigmoid/rectosigmoid junction. Multiple biopsies were obtained. [place] ink tattoo was injected distal to the lesion. The prep was suboptimal at few places which was washed and suctioned. Severe pandiverticulosis seen, especially in the left colon. 5 mm polyp in the ascending colon, removed by cold snare polypectomy. 5 mm polyp in the descending colon near a previously placed tattoo nicole, removed by cold snare polypectomy. Recommendations:     Follow pathology results  Recommend CT scan abdomen pelvis with contrast  Recommend surgery referral after review of pathology results  Follow-up with us in the office in 2 to 3 weeks to discuss further          Sadi Eason, [address] [place] [address]  [phone]    8/31/2022

## 2022-08-31 NOTE — ANESTHESIA POSTPROCEDURE EVALUATION
Department of Anesthesiology  Postprocedure Note    Patient: Aaron Aschoff  MRN: [id number]  YOB: 1940  Date of evaluation: 8/31/2022      Procedure Summary     Date: 08/31/22 Room / Location: [facility] [place] [address] / [facility]    Anesthesia Start: 1101 Anesthesia Stop: 1140    Procedures:       EGD BIOPSY      COLONOSCOPY WITH BIOPSY      COLONOSCOPY POLYPECTOMY SNARE/COLD BIOPSY      COLONOSCOPY SUBMUCOSAL/ SPOT  INJECTION Diagnosis:       Lower abdominal pain      Dyspepsia      (Lower abdominal pain [R10.30])      (Dyspepsia [R10.13])    Surgeons: Chicho Johnson MD Responsible Provider: Vicenta Emery DO    Anesthesia Type: MAC ASA Status: 3          Anesthesia Type: No value filed.     Glo Phase I: Glo Score: 10    Glo Phase II: Glo Score: 9      Anesthesia Post Evaluation    Patient location during evaluation: PACU  Patient participation: complete - patient participated  Level of consciousness: awake  Pain score: 0  Airway patency: patent  Nausea & Vomiting: no nausea and no vomiting  Complications: no  Cardiovascular status: blood pressure returned to baseline  Respiratory status: acceptable  Hydration status: euvolemic  Multimodal analgesia pain management approach

## 2022-08-31 NOTE — DISCHARGE INSTRUCTIONS
ENDOSCOPY DISCHARGE INSTRUCTIONS:    Call the physician that did your procedure for any questions or concern:    [facility]: [phone]  DR. SHAH [facility]        ACTIVITY:    There are potential side effects to the medications used for sedation and anesthesia during your procedure. These include:  Dizziness or light-headedness, confusion or memory loss, delayed reaction times, loss of coordination, nausea and vomiting. Because of your increased risk for injury, we ask that you observe the following precautions: For the next 24 hours,  DO NOT operate an automobile, bicycle, motorcycle, , power tools or large equipment of any kind. Do not drink alcohol, sign any legal documents or make any legal decisions for 24 hours. Do not bend your head over lower than your heart. DO sit on the side of bed/couch awhile before getting up. Plan on bedrest or quiet relaxation today. You may resume normal activities in 24 hours. DIET:    Your first meal today should be light, avoiding spicy and fatty foods. If you tolerate this first meal, then you may advance to your regular diet unless otherwise advised by your physician. NORMAL SYMPTOMS:  -Mild sore throat if youve had an EGD   -Gaseous discomfort    NOTIFY YOUR PHYSICIAN IF THESE SYMPTOMS OCCUR:  1. Fever (greater than 100)  5. Increased abdominal bloating  2. Severe pain    6. Excessive bleeding  3. Nausea and vomiting  7. Chest pain                                                                    4. Chills    8. Shortness of breath    ADDITIONAL INSTRUCTIONS:    Biopsy results: Call [address],[facility] for biopsy results in 1 week    Educational Information:          Please review these discharge instructions this evening or tomorrow for  information you may have forgotten. We want to thank you for choosing the [facility] as your health care provider. We always strive to provide you with excellent care while you are here.  You may receive a survey in the mail regarding your care. We would appreciate you taking a few minutes of your time to complete this survey. Per Dr. Rajani Nicole,       Impression:    A large 4 to 5 cm circumferential villous appearing polypoid mass seen starting at 12 cm in the proximal rectum extending into the distal sigmoid/rectosigmoid junction. Multiple biopsies were obtained. [place] ink tattoo was injected distal to the lesion. The prep was suboptimal at few places which was washed and suctioned. Severe pandiverticulosis seen, especially in the left colon. 5 mm polyp in the ascending colon, removed by cold snare polypectomy. 5 mm polyp in the descending colon near a previously placed tattoo nicole, removed by cold snare polypectomy. Recommendations:     Follow pathology results  Recommend CT scan abdomen pelvis with contrast  Recommend surgery referral after review of pathology results  Follow-up with us in the office in 2 to 3 weeks to discuss further              Lakeshia Swenson, [address] [place] [address]  [phone]     8/31/2022

## 2022-08-31 NOTE — PROGRESS NOTES
Ambulatory Surgery/Procedure Discharge Note    Vitals:    08/31/22 1210   BP: 110/74   Pulse: 60   Resp: 18   Temp:    SpO2: 96%   Dr. Louann Abbott spoke with the patient prior to discharge. In: 290 [P.O.:240; I.V.:50]  Out: -     Restroom use offered before discharge. Yes    Pain assessment:  level of pain (1-10, 10 severe)  Pain Level: 0        Patient discharged to home/self care.  Patient discharged via wheel chair by transporter to waiting family/S.O.       8/31/2022 12:14 PM

## 2022-08-31 NOTE — ANESTHESIA PRE PROCEDURE
Department of Anesthesiology  Preprocedure Note       Name:  Sharmila Sterling   Age:  78 y.o.  :  1940                                          MRN:  [id number]         Date:  2022      Surgeon: Theresa Josue):  Josselyn Alcala MD    Procedure: Procedure(s):  ESOPHAGOGASTRODUODENOSCOPY  COLONOSCOPY    Medications prior to admission:   Prior to Admission medications    Medication Sig Start Date End Date Taking? Authorizing Provider   rosuvastatin (CRESTOR) 10 MG tablet TAKE 1 TABLET BY MOUTH EVERY DAY 22   Martha Langston MD   tamsulosin [facility]) 0.4 MG capsule Take 2 capsules by mouth daily 22   Martha Langston MD   dutasteride (AVODART) 0.5 MG capsule TAKE 1 CAPSULE BY MOUTH DAILY 22   ANUM Mcbride CNP   aspirin 81 MG tablet Take 81 mg by mouth daily    Historical Provider, MD   Flaxseed Linseed, 1000 MG CAPS Take 1 capsule by mouth 2 times daily. Historical Provider, MD   multivitamin [facility]) per tablet Take 1 tablet by mouth daily. Historical Provider, MD   Psyllium (METAMUCIL PO) Take 2 Units by mouth daily. 2 TSP. 5/15/10   Historical Provider, MD       Current medications:    Current Facility-Administered Medications   Medication Dose Route Frequency Provider Last Rate Last Admin    lactated ringers infusion   IntraVENous Continuous Jenn Sheriff MD        sodium chloride flush 0.9 % injection 5-40 mL  5-40 mL IntraVENous 2 times per day Jenn Sheriff MD        sodium chloride flush 0.9 % injection 5-40 mL  5-40 mL IntraVENous PRN Jenn Sheriff MD        0.9 % sodium chloride infusion   IntraVENous PRN Jenn Sheriff MD           Allergies:     Allergies   Allergen Reactions    Demerol     Oxycodone Hcl     Meperidine Nausea And Vomiting       Problem List:    Patient Active Problem List   Diagnosis Code    Osteoarthrosis involving lower leg [id number]    Actinic keratosis L57.0    Hearing loss H91.90    BPH with obstruction/lower urinary tract symptoms N40.1, N13.8    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    TIA (transient ischemic attack) G45.9    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    Joint prosthesis infection or inflammation (HCC) T84.50XA    Mitral valve disease I05.9    Aortic valve disease I35.9    H/O endocarditis Z86.79    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    Diverticulosis of colon     Diverticulosis of large intestine 5/15/2010    Essential hypertension 1/17/2013    History of GI bleed 4/2012    Hydronephrosis, left 7/2/2016    Hyperlipidemia     Left carpal tunnel syndrome 1/26/2022    Mitral valve disorder     MSSA (methicillin susceptible Staphylococcus aureus) septicemia ([address] 3/2012    Osteoarthritis     neck, back, knees    Partial small bowel obstruction ([address]     Staph aureus infection 3/10/12    blood    TIA (transient ischemic attack) 5/2012       Past Surgical History:        Procedure Laterality Date    AORTIC VALVE SURGERY      CARDIAC SURGERY  6/2012    angiogram-no blockage    COLONOSCOPY      HERNIA REPAIR      LEFT INGUINAL.     JOINT REPLACEMENT      RIGHT KNEE TOTAL 11/05, left knee 2006    KNEE PROSTHESIS REMOVAL Left     KNEE SURGERY  4/2012    to remove blood clot on right knee    KNEE SURGERY  3/2012    for MSSA infection bilat. knees    MITRAL VALVE SURGERY      OTHER SURGICAL HISTORY  07/03/2016    CYSTOSCOPY, LEFT URETEROSCOPY STONE MANIPULATION WITH LASER,    TOTAL KNEE ARTHROPLASTY      bilat    VASECTOMY         Social History:    Social History     Tobacco Use    Smoking status: Never    Smokeless tobacco: Never   Substance Use Topics    Alcohol use: Yes     Alcohol/week: 4.0 - 5.0 standard drinks     Types: 2 - 3 Glasses of wine, 2 Cans of beer per week     Comment: drinks every other day                                Counseling given: Not Answered      Vital Signs (Current):   Vitals:    08/31/22 0936   BP: (!) 147/100   Pulse: 70   Resp: 16   Temp: (!) 96.6 °F (35.9 °C)   TempSrc: Temporal   SpO2: 97%   Weight: 165 lb (74.8 kg)   Height: 5' 3.5\" (1.613 m)                                              BP Readings from Last 3 Encounters:   08/31/22 (!) 147/100   08/19/22 110/80   05/09/22 128/80       NPO Status: Time of last liquid consumption: 0600                        Time of last solid consumption: 1900                        Date of last liquid consumption: 08/31/22                        Date of last solid food consumption: 08/30/22    BMI:   Wt Readings from Last 3 Encounters:   08/31/22 165 lb (74.8 kg)   08/19/22 167 lb (75.8 kg)   05/09/22 171 lb (77.6 kg)     Body mass index is 28.77 kg/m².     CBC:   Lab Results   Component Value Date/Time    WBC 7.2 02/09/2022 10:53 AM    RBC 5.04 02/09/2022 10:53 AM    HGB 15.1 02/09/2022 10:53 AM    HCT 45.4 02/09/2022 10:53 AM    MCV 90.0 02/09/2022 10:53 AM    RDW 14.6 02/09/2022 10:53 AM     02/09/2022 10:53 AM       CMP:   Lab Results   Component Value Date/Time     04/27/2022 09:44 AM    K 5.1 04/27/2022 09:44 AM    K 4.6 06/12/2019 05:53 PM    CL 96 04/27/2022 09:44 AM    CO2 26 04/27/2022 09:44 AM    BUN 10 04/27/2022 09:44 AM    CREATININE 0.9 04/27/2022 09:44 AM    GFRAA >60 04/27/2022 09:44 AM    GFRAA >60 01/11/2013 11:26 AM    AGRATIO 1.7 04/27/2022 09:44 AM    LABGLOM >60 04/27/2022 09:44 AM    LABGLOM 50 04/22/2013 01:30 PM    GLUCOSE 93 04/27/2022 09:44 AM    GLUCOSE 97 12/13/2011 08:14 AM    PROT 6.7 04/27/2022 09:44 AM    PROT 7.9 01/11/2013 11:26 AM    CALCIUM 9.2 04/27/2022 09:44 AM    BILITOT 0.8 04/27/2022 09:44 AM    ALKPHOS 121 04/27/2022 09:44 AM    AST 32 04/27/2022 09:44 AM    ALT 20 04/27/2022 09:44 AM       POC Tests: No results for input(s): POCGLU, POCNA, POCK, POCCL, POCBUN, POCHEMO, POCHCT in the last 72 hours. Coags:   Lab Results   Component Value Date/Time    PROTIME 12.6 01/11/2013 11:26 AM    INR 1.11 01/11/2013 11:26 AM    APTT 30.8 07/13/2012 01:20 PM       HCG (If Applicable): No results found for: PREGTESTUR, PREGSERUM, HCG, HCGQUANT     ABGs: No results found for: PHART, PO2ART, QNI5PVQ, FIL1YKD, BEART, K0VLEBCG     Type & Screen (If Applicable):  Lab Results   Component Value Date    LABABO A 02/21/2013    [address] Negative 07/13/2012       Drug/Infectious Status (If Applicable):  No results found for: HIV, HEPCAB    COVID-19 Screening (If Applicable): No results found for: COVID19        Anesthesia Evaluation  Patient summary reviewed and Nursing notes reviewed no history of anesthetic complications:   Airway: Mallampati: III  TM distance: >3 FB   Neck ROM: full  Mouth opening: > = 3 FB   Dental: normal exam         Pulmonary:normal exam    (+) sleep apnea:                             Cardiovascular:  Exercise tolerance: no interval change,   (+) hypertension:, valvular problems/murmurs (mitral repair): AI,     (-)  angina, orthopnea and PND    ECG reviewed  Rhythm: regular  Rate: normal  Echocardiogram reviewed         Beta Blocker:  Not on Beta Blocker         Neuro/Psych:   (+) neuromuscular disease:, TIA,             GI/Hepatic/Renal:             Endo/Other:                     Abdominal:             Vascular: Other Findings:           Anesthesia Plan      MAC     ASA 3       Induction: intravenous. Anesthetic plan and risks discussed with patient.       Plan discussed with CRNA.                     Shady Garcia DO   8/31/2022

## 2022-09-02 NOTE — H&P
Pre-operative History and Physical    Patient: Ariana Franklin  : 1940  Acct#:     History Obtained From:  patient    HISTORY OF PRESENT ILLNESS:    The patient is a 78 y.o. male  who presents with screening, dyspepsia, GERD    Past Medical History:        Diagnosis Date    Aortic valve disorder     Arthritis     Blood transfusion     BPH (benign prostatic hypertrophy)     Cancer ([address]     SKIN. 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    PCR positive    Diverticulosis of colon     Diverticulosis of large intestine 5/15/2010    Essential hypertension 2013    History of GI bleed 2012    Hydronephrosis, left 2016    Hyperlipidemia     Left carpal tunnel syndrome 2022    Mitral valve disorder     MSSA (methicillin susceptible Staphylococcus aureus) septicemia ([address] 3/2012    Osteoarthritis     neck, back, knees    Partial small bowel obstruction ([address]     Staph aureus infection 3/10/12    blood    TIA (transient ischemic attack) 2012     Past Surgical History:        Procedure Laterality Date    AORTIC VALVE SURGERY      CARDIAC SURGERY  2012    angiogram-no blockage    COLONOSCOPY      COLONOSCOPY N/A 2022    COLONOSCOPY WITH BIOPSY performed by Lila Werner MD at [facility] N/A 2022    COLONOSCOPY POLYPECTOMY SNARE/COLD BIOPSY performed by Lila Werner MD at [facility] N/A 2022    COLONOSCOPY SUBMUCOSAL/ SPOT  INJECTION performed by Lila Werner MD at [facility].     JOINT REPLACEMENT      RIGHT KNEE TOTAL , left knee 2006    KNEE PROSTHESIS REMOVAL Left     KNEE SURGERY  2012    to remove blood clot on right knee    KNEE SURGERY  3/2012    for MSSA infection bilat. knees    MITRAL VALVE SURGERY      OTHER SURGICAL HISTORY  2016    CYSTOSCOPY, LEFT URETEROSCOPY STONE MANIPULATION WITH LASER,    TOTAL KNEE ARTHROPLASTY      bilat    UPPER GASTROINTESTINAL ENDOSCOPY N/A 8/31/2022    EGD BIOPSY performed by Janice Harvey MD at [address]       Medications Prior to Admission:   No current facility-administered medications on file prior to encounter. Current Outpatient Medications on File Prior to Encounter   Medication Sig Dispense Refill    dutasteride (AVODART) 0.5 MG capsule TAKE 1 CAPSULE BY MOUTH DAILY 90 capsule 1    aspirin 81 MG tablet Take 81 mg by mouth daily      Flaxseed, Linseed, 1000 MG CAPS Take 1 capsule by mouth 2 times daily. multivitamin (THERAGRAN) per tablet Take 1 tablet by mouth daily. Psyllium (METAMUCIL PO) Take 2 Units by mouth daily. 2 TSP. Allergies:  Demerol, Oxycodone hcl, and Meperidine    History of allergic reaction to anesthesia:  No    PHYSICAL EXAM:      /74   Pulse 60   Temp 97.2 °F (36.2 °C) (Temporal)   Resp 18   Ht 5' 3.5\" (1.613 m)   Wt 165 lb (74.8 kg)   SpO2 96%   BMI 28.77 kg/m²  I        Heart:  Normal apical impulse, regular rate and rhythm, normal S1 and S2, no S3 or S4, and no murmur noted    Lungs:  No increased work of breathing, good air exchange, clear to auscultation bilaterally, no crackles or wheezing    Abdomen:  No scars, normal bowel sounds, soft, non-distended, non-tender, no masses palpated, no hepatosplenomegally      ASA Grade:  ASA 3 - Patient with moderate systemic disease with functional limitations      ASSESSMENT AND PLAN:    1. Patient is a 78 y.o. male here for EGD/colonoscopy with deep sedation  2. Procedure options, risks and benefits reviewed with patient. Patient expresses understanding.             Don Andersen MD  [facility]  [phone]

## 2022-09-09 DIAGNOSIS — D12.5 ADENOMATOUS POLYP OF SIGMOID COLON: ICD-10-CM

## 2022-09-09 DIAGNOSIS — R10.84 GENERALIZED ABDOMINAL PAIN: Primary | ICD-10-CM

## 2022-09-30 ENCOUNTER — HOSPITAL ENCOUNTER (OUTPATIENT)
Dept: CT IMAGING | Age: 82
Discharge: HOME | End: 2022-09-30
Payer: MEDICARE

## 2022-09-30 DIAGNOSIS — R10.84 GENERALIZED ABDOMINAL PAIN: ICD-10-CM

## 2022-09-30 LAB
GFR AFRICAN AMERICAN: >60
GFR NON-AFRICAN AMERICAN: >60
PERFORMED ON: NORMAL
POC CREATININE: 0.8 MG/DL (ref 0.8–1.3)
POC SAMPLE TYPE: NORMAL

## 2022-09-30 PROCEDURE — 82565 ASSAY OF CREATININE: CPT

## 2022-09-30 PROCEDURE — 6360000004 HC RX CONTRAST MEDICATION: Performed by: FAMILY MEDICINE

## 2022-09-30 PROCEDURE — 74177 CT ABD & PELVIS W/CONTRAST: CPT

## 2022-09-30 RX ADMIN — IOPAMIDOL 75 ML: 755 INJECTION, SOLUTION INTRAVENOUS at 11:12

## 2022-10-04 ENCOUNTER — OFFICE VISIT (OUTPATIENT)
Dept: SURGERY | Age: 82
End: 2022-10-04
Payer: MEDICARE

## 2022-10-04 VITALS
DIASTOLIC BLOOD PRESSURE: 89 MMHG | SYSTOLIC BLOOD PRESSURE: 141 MMHG | WEIGHT: 171 LBS | OXYGEN SATURATION: 98 % | HEIGHT: 64 IN | HEART RATE: 71 BPM | TEMPERATURE: 97.7 F | BODY MASS INDEX: 29.19 KG/M2

## 2022-10-04 DIAGNOSIS — I05.9 MITRAL VALVE DISEASE: ICD-10-CM

## 2022-10-04 DIAGNOSIS — D12.8 ADENOMATOUS RECTAL POLYP: Primary | ICD-10-CM

## 2022-10-04 PROCEDURE — 99205 OFFICE O/P NEW HI 60 MIN: CPT | Performed by: SURGERY

## 2022-10-04 PROCEDURE — 1123F ACP DISCUSS/DSCN MKR DOCD: CPT | Performed by: SURGERY

## 2022-10-04 RX ORDER — SODIUM CHLORIDE 0.9 % (FLUSH) 0.9 %
5-40 SYRINGE (ML) INJECTION PRN
OUTPATIENT
Start: 2022-10-04

## 2022-10-04 RX ORDER — ACETAMINOPHEN 325 MG/1
1000 TABLET ORAL ONCE
OUTPATIENT
Start: 2022-10-04 | End: 2022-10-04

## 2022-10-04 RX ORDER — ENOXAPARIN SODIUM 100 MG/ML
40 INJECTION SUBCUTANEOUS ONCE
OUTPATIENT
Start: 2022-10-04 | End: 2022-10-04

## 2022-10-04 RX ORDER — SODIUM CHLORIDE 9 MG/ML
INJECTION, SOLUTION INTRAVENOUS PRN
OUTPATIENT
Start: 2022-10-04

## 2022-10-04 RX ORDER — SODIUM CHLORIDE 0.9 % (FLUSH) 0.9 %
5-40 SYRINGE (ML) INJECTION EVERY 12 HOURS SCHEDULED
OUTPATIENT
Start: 2022-10-04

## 2022-10-04 NOTE — LETTER
[facility] - Sur[facility] of [address] [place] [address] [phone]  f [phone]    Allison Chawla MD                        SURGERY ORDER   -- Time of order -- 10/4/22    9:03 AM    Facility:   [facility] # _________________                                                                                    Scheduled By:____________                  Surgery Date & Time: 23 7:15AM                                       Pt arrival: 5:30AM                                                                                      Patient Name:  Larinda Crigler     :  1940     PCP:  Ana Gresham MD      Home Ph:    [phone] (home) [phone] (work)                                                    PROCEDURE:  Robotic low anterior resection [address]    DIAGNOSIS:  Rectal mass D12.8    Anesthesia: _General  + exparel TAP block  Time Needed:  4 hours    Pt Position:  lithotomy    Ureteral Stents: no  Ostomy Marking: no          Admit _x__                  Pre-Op clearance to be done by: _PCP____    Cardiac Clearance Done by: __N/A______    Medications to be stopped 7 days before surgery: Aspirin                                                                                                                     Allison Chawla MD  Insurance:                                ID #                                        Ph #     (secondary ?)       Date called ______        Jamie Oiler to: _____ @ _____           Precert Needed?  Yes  /  No    PreAuth # & Details _______________________________________________________                        ______ [facility] _[phone]_      Post Op_________              ____Inst given                 _____ Jamie Oiler to Pt/Spouse                          COLON SURGERY CHECKLIST    -- Pre-op clearance: PCP  -- Anticoagulation/lovenox, ASA, plavix, etc  -- Surgery order faxed, date/time obtained, placed on calendar  -- Prep and oral antibiotics (#2) ordered, information given to patient  -- Phone call day before procedure to confirm  -- Post op appt: 2 weeks  -- Other: plan surg in January per wife request

## 2022-10-04 NOTE — PROGRESS NOTES
[facility] COLORECTAL SURGERY  [address]  Dept: [phone]  Dept Fax: [phone]  Loc: [phone]    Visit Date: 10/4/2022    Carlos Manuel Arguello is a 78 y.o. male who presents today for: New Patient (TVA at rectosigmoid junction)      HPI:       Carlos Manuel Arguello is a 78 y.o. male referred to me for further evaluation regarding adenomatous polyp of the rectosigmoid junction. Donna Damon is accompanied by his wife today in the office. No previous abdominal surgeries. Does have previous mitral valve replacement. States he is fairly healthy. Underwent recent routine colonoscopy on 8/31/2022 by Dr. Ming Aguirre of GI. He was found to have a few small polyps throughout the colon, as well as a large tubulovillous appearing mass starting at 12 cm from the anal verge in the proximal rectum extending to the distal sigmoid. Biopsies showed adenomatous tissue without malignancy. In September he underwent a CT scan which showed no acute abnormalities, but did have diverticulosis. Did have a previous colonoscopy 10 years ago during a GI bleed. No abdominal pain or rectal bleeding currently. Patient's problem list, medications, past medical, surgical, family, and social histories were reviewed and updated in the chart as indicated today. Past Medical History:   Diagnosis Date    Aortic valve disorder     Arthritis     Blood transfusion     BPH (benign prostatic hypertrophy)     Cancer ([facility])     SKIN.     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    Diverticulosis of colon     Diverticulosis of large intestine 5/15/2010    Essential hypertension 1/17/2013    History of GI bleed 4/2012    Hydronephrosis, left 7/2/2016    Hyperlipidemia     Left carpal tunnel syndrome 1/26/2022    Mitral valve disorder     MSSA (methicillin susceptible Staphylococcus aureus) septicemia ([facility] 3/2012    Osteoarthritis     neck, back, knees    Partial small bowel obstruction ([address]     Staph aureus infection 3/10/12    blood    TIA (transient ischemic attack) 5/2012       Past Surgical History:   Procedure Laterality Date    AORTIC VALVE SURGERY      CARDIAC SURGERY  6/2012    angiogram-no blockage    COLONOSCOPY      COLONOSCOPY N/A 8/31/2022    COLONOSCOPY WITH BIOPSY performed by Valentino Oto, MD at [place] [address] N/A 8/31/2022    COLONOSCOPY POLYPECTOMY SNARE/COLD BIOPSY performed by Valentino Oto, MD at [facility] 8/31/2022    COLONOSCOPY SUBMUCOSAL/ SPOT  INJECTION performed by Valentino Oto, MD at [place] [address]. JOINT REPLACEMENT      RIGHT KNEE TOTAL 11/05, left knee 2006    KNEE PROSTHESIS REMOVAL Left     KNEE SURGERY  4/2012    to remove blood clot on right knee    KNEE SURGERY  3/2012    for MSSA infection bilat. knees    MITRAL VALVE SURGERY      OTHER SURGICAL HISTORY  07/03/2016    CYSTOSCOPY, LEFT URETEROSCOPY STONE MANIPULATION WITH LASER,    TOTAL KNEE ARTHROPLASTY      bilat    UPPER GASTROINTESTINAL ENDOSCOPY N/A 8/31/2022    EGD BIOPSY performed by Valentino Oto, MD at [address]         Cancer-related family history includes Cancer in his brother. Social History:   Social History     Tobacco Use    Smoking status: Never    Smokeless tobacco: Never   Substance Use Topics    Alcohol use: Yes     Alcohol/week: 4.0 - 5.0 standard drinks     Types: 2 - 3 Glasses of wine, 2 Cans of beer per week     Comment: drinks every other day      Tobacco cessation counseling provided as appropriate. REVIEW OF SYSTEMS:    Pertinent positives and negatives are mentioned in the HPI above. Otherwise, all other systems were reviewed and negative.       Objective:     Physical Exam   BP (!) 141/89   Pulse 71   Temp 97.7 °F (36.5 °C) (Infrared)   Ht 5' 3.5\" (1.613 m)   Wt 171 lb (77.6 kg)   SpO2 98%   BMI 29.82 kg/m² Constitutional: Appears well-developed and well-nourished. Grooming appropriate. No gross deformities. Body mass index is 29.82 kg/m². Eyes: No scleral icterus. Conjunctiva/lids normal. Vision intact grossly. Pupils equal/symmetric, reactive bilaterally. ENT: External ears/nose without defect, scars, or masses. Hearing grossly intact. No facial deformity. Lips normal, normal dentition. Neck: No masses. Trachea midline. No crepitus. Thyroid not enlarged. Cardiovascular: Normal rate. No peripheral edema. Abdominal aorta normal size to palpation. Pulmonary/Chest: Effort normal. No respiratory distress. No wheezes. No use of accessory muscles. Musculoskeletal: Normal range of motion x all 4 extremities and head/neck, without deformity, pain, or crepitus, with normal strength and tone. Normal gait. Nails without clubbing or cyanosis. Neurological: Alert and oriented to person, place, and time. No gross deficits. Sensation intact. Skin: Skin is dry. No rashes noted. No pallor. No induration of nodules. Psychiatric: Normal mood and affect. Behavior normal. Oriented to person, place, and time. Judgment and insight reasonable. Abdominal/wound: Soft, nontender, obese, reducible umbilical hernia    Labs reviewed: None  Radiology reviewed: CT scan reviewed, including personal interpretation, no evidence of mass or metastatic disease. Last colonoscopy: DAWSON Faye, as described above    Review of pathology report from colonoscopy. Discussion and coordination of care with GI. Assessment/Plan:     A/P:  New problem(s) with uncertain prognosis: Tubulovillous adenoma of proximal rectum  Established problem(s): Obesity, umbilical hernia, mitral valve replacement  Additional workup/treatment planned: Low anterior resection  Risk of complications/morbidity: High    Had a long discussion with Gianna Butts and his wife today in the office.   We discussed the pathophysiology, etiology, work-up, natural street, treatment options regarding precancerous polyps of the colon and rectum. Given the large nature of it and precancerous pathology, I do recommend definitive surgical resection. Discussed that this would require low anterior versus anterior resection. Discussed robotic and laparoscopic techniques. There is no urgency to this, but I do recommend we do it within the next 3 to 4 months. The patient's wife would like to wait until early January as she has her own medical issues that she would like to have addressed before the surgery. Discussed need for PCP clearance. Discussed that if they decide they would like to have surgery sooner, that is perfectly reasonable as well. I had a discussion with the patient and wife regarding the risks, benefits, and alternatives of the procedure, including, but not limited to: bleeding, infection, anastomotic leak, poor wound healing or cosmetic result, hernia, bowel obstruction, fistula, need for reoperation or additional procedures, temporary or permanent ostomy, damage to other structures, such as bowel, bladder, ureter, stomach, liver, and neurovascular structures. Need for, and risks of general anesthesia discussed. Postoperative typical bowel function, urinary function, and sexual function were discussed. The likelihood of open operation was discussed as well. Prep, preoperative testing, typical hospital stay, and perioperative expectations were addressed, as well as typical expectations regarding pain control and time away from work and daily activities. All questions were answered to patient's satisfaction. They understand that even in technically successfully operations and even in healthy patients, complications can occur, including possibility of death. I provided and encouraged patients and family members to review AVS (after visit summary) information that I have provided, that contains even more information regarding surgical risks and complications.  They were encouraged to reach out to my office if they have any questions before or after surgery. Patient understands higher risk of perioperative morbidity and mortality given: age    Continue with current medications    I provided written information in the After Visit Summary AVS Regarding: Colectomy surgery    DISPOSITION: Plan for surgery in January. My findings will be relayed to consulting practitioner or PCP via Epic    Note completed using dictation software, please excuse any errors.     Electronically signed by Jaz Chaparro MD on 10/4/2022 at 8:58 AM

## 2022-10-04 NOTE — PATIENT INSTRUCTIONS
COLON SURGERY    There are a number of reasons that patients may be recommended to have surgery on their colon (large intestine). Common reasons include cancers, large polyps (abnormal precancerous growths), bleeding, diverticulitis, fistula (tunneling into other organs), Crohn's disease, ulcerative colitis, strictures (decreased caliber of colon). WHAT IS THE COLON? The colon and rectum are parts of the digestive system. They form a long, muscular tube called the large intestine (also called the large bowel). The colon is the first 4 to 5 feet of the large intestine, and the rectum is the last six inches. Partially digested food enters the colon from the small intestine. The colon removes water from the food and turns the rest into waste (stool). The waste passes from the colon into the rectum and then out of the body through the anus (opening). HOW IS COLON SURGERY PERFORMED    Laparoscopy/Robotic: The colon may be removed with the aid of a thin, lighted tube (a laparoscope) connected to a video screen. Three or four tiny cuts are made into your abdomen. The surgeon sees inside your abdomen with the laparoscope and is able to use small instruments to free the colon up from its attachments in the body. The colon and nearby lymph nodes are then removed along with part of the healthy colon. Typically a 2 inch incision is used to remove the colon from the body and to connect the intestine back together (anastomosis). The laparoscope may also be used with new approaches to cancer removal such as robotic surgery. Robotic surgery is very similarly performed with similar results, but can provide technical advantages on a case-by-case basis. Open surgery: The surgeon makes a larger incision on your abdomen to remove the colon or rectum. Nearby lymph nodes are also removed.  In up to 20% of operations for the colon, the surgery cannot safely be accomplished with laparoscopy, so an open operation is performed or a laparoscopic surgery is converted to an open surgery at the same time as the laparoscopic operation. Risk factors for requiring conversion to an open surgery include scar tissue from previous abdominal surgeries, large hernias, bleeding, or large tumors. Both techniques require general anesthesia (completely asleep) for 2-6 hours (depending on the specifics of the operation being performed), and approximately 2 - 5 days on average spent recovering in the hospital. Several large studies have demonstrated that both approaches produce equivalent cancer outcomes when performed by surgeons with sufficient training in colorectal surgery. The surgeon removes the colon and the same amount of normal colon and lymph nodes with either approach. There are specific indications for choosing laparoscopy, robotic, or open surgery; your surgeon will discuss these features with you prior to the operation. When a section of your colon or rectum is removed, the surgeon can usually reconnect the healthy ends of your intestine. This is called an anastomosis. However, sometimes reconnection is not possible. In this case, the surgeon creates a new path for waste to leave your body. The surgeon makes an opening (stoma) in the wall of the abdomen, connects the intestine to the skin, and closes the other end. The operation to create the stoma is called a colostomy. A flat bag fits over the stoma to collect waste, and a special adhesive holds it in place. Less commonly, the small intestine may be used to create a stoma (an ileostomy). Ostomies can be temporary or permanent. For many people, a temporary stoma can be reversed 2-6 months later, depending on the situation. BEFORE SURGERY    Be sure to discuss all of your medications with your surgeon. If you take any blood thinners, a plan will need to be in place for stopping these at a certain time before surgery.   This will often be coordinated with your primary care provider or another specialist, such as a cardiologist, if needed. Be sure to be as active as possible in the weeks before surgery, and to maintain a healthy lifestyle and diet. You will receive information regarding the bowel prep from the office. Be sure to  your prescriptions and other bowel prep at least 2 to 3 days before your surgery so that you are prepared to take the prep the day before surgery. Follow the instructions on the bowel prep information. You should not be eating any solid food the day before surgery. Be sure to alert all of your physicians regarding the plan for surgery. You may need to obtain a preoperative history and physical or clearance from your primary care provider, or other specialists, such as cardiology or pulmonology. Be sure you know what time to arrive to the hospital, and arrange someone to drive you. Typically you will arrive at least 1.5 to 2 hours before the scheduled surgery time. During this time, you will receive an IV, and meet the other members of the surgical team, have your questions answered, and be registered for hospital admission. Your surgeon will be in to see you before surgery as well to ensure all of your questions are answered. RECOVERY AFTER SURGERY    On average, after laparoscopic and robotic surgery, patients are typically discharged in 2 to 4 days. Open surgery usually requires a longer hospital stay, average 3 to 6 days. During your hospital stay, you will be encouraged to be active, spend most of the day out of bed, and walk the halls (with assistance as needed). You will also be encouraged to use a breathing device called an incentive spirometer, which can help maintain lung expansion and prevent pneumonia. Most patients will have a catheter in the bladder for at least 1 day. The time it takes to heal after surgery is different for each person. You may be uncomfortable for the first few days.  Medicine can help control your pain; you should be comfortable enough to stand and walk with assistance. Before surgery, you should discuss the plan for pain relief with your doctor or nurse. After surgery, your doctor can adjust the plan if you need more pain relief. It is common to feel tired or weak for awhile. Surgery can also cause postoperative constipation or diarrhea. Your surgeon can provide instructions on the management of these conditions. In addition, your health care team monitors you for signs of bleeding, infection, or other problems requiring immediate treatment. Recovery after leaving the hospital can vary from person to person. You should resume light activity such as walking and personal care. Most people take about 2 weeks off from work after a colon operation, depending on the specifics of your job. You will be restricted on heavy lifting (over 10 pounds) for about 4-6 weeks after the operation to help lower the risk of abnormal healing, which can lead to hernias. Generally, you may resume a normal diet, and adequate fluid intake is especially important. Ask your surgeon or nurse prior to discharge about resuming your prior medications in addition to any new medicine you may be taking. At home, most patients can control pain adequately using Tylenol and ibuprofen (or Motrin/Advil). Ice packs and heating pads can also be used. You will also be given a prescription for a narcotic medication such as Percocet or oxycodone, which you should only use as needed. Narcotic medications can have severe side effects of nausea and constipation, and some patients may develop dependence. You should not drive, operate any machinery, drink alcohol, or make major decisions while taking narcotics. You will receive a phone call from the surgeon regarding your pathology results in cases of cancer or precancerous tissue. Pathology takes at least 4-5 business days before a final report is generated.     After discharge, please call the office to schedule a postoperative appointment in about 2 weeks. During this appointment, Dr. Efrain Cummings will examine your wounds, and see how you are recovering from surgery. If you have any paperwork that needs to be filled out for work, please bring this to the office or fax this to the office. RISKS OF COLON SURGERY    Colon surgery is major surgery, and has risks. Even in healthy patients, complications can happen. Some of these risks can include (but are not limited to): bleeding, wound infections, deeper infections, hernias (especially with open surgery), scar tissue, missed lesions, unexpected findings, need to go back to the operating room or have another procedure, need for temporary or permanent stoma (see above), damage to other structures (such as intestine, blood vessels, other organs, and nerves), blood clots, pneumonia, heart attack, kidney failure, urinary infections, anastomotic leak, and even death. Anastomotic leak occurs if the bowel reconnection does not heal properly. Even in healthy patient with no medical problems, this can occur. Symptoms can include rectal bleeding, fevers, bloating, change in appetite, change in heart rate and generally not feeling well. Sometimes it can be difficult to diagnose a leak given the big overall with normal recovery and other problems that occur with surgery. Leaks occur most commonly within the first 1-2 weeks after surgery, but can be found much later as well. If a leak does happen, there is a wide range of possible effects, depending on the severity. With small/minor leaks, the leak may seal itself off, and some patients may not notice any changes. Some may have low grade fevers or change in appetite.  In some leaks, a fluid collection (abscess) may develop that can typically be treated with antibiotics and sometimes a drainage tube placed by a radiologist. Fluid collections can sometimes heal spontaneously, or sometimes develop into a long term fistula, or abnormal connection to the skin, or another organ. Occasionally these can require a second surgery if it does resolve over a reasonable amount of time (typically 3-6 months). Another long term problem with an imperfect healing anastomosis is a stricture, or over-tightening of the colon, that can cause difficulties eliminating stool. In patients with a substantial or large leak, this can be quite dangerous, and can cause sepsis. Emergency reoperation may be necessary to control the stool spillage in this situation. This last scenario is especially worrisome for need for a stoma, long term issues with bowel function, prolonged recovery, or even death. Many of these complications are increased in those with risk factors such as: current and previous smokers, poorly controlled diabetics, alcohol drinkers, patients with previous abdominal surgery, patients with history of chemotherapy or radiation, those who take steroids or blood thinners, patients with increased disposition to heart, lung, or kidney problems and those who are obese. You will be under general anesthesia (completely asleep) during the operation. There are risks of anesthesia, especially with longer operations, that will be discussed in greater detail with you by the anesthesiologist on the day of surgery. Some of these risks include changes in breathing or circulation, allergic reactions to medications, need to be on the ventilator after surgery. Please reach out to your surgeon if you have any questions about these risks and complications. WHAT IS A COLON AND RECTAL SURGEON? Colon and rectal surgeons are experts in the surgical and non-surgical treatment of diseases of the colon, rectum and anus. They have completed advanced surgical training in the treatment of these diseases as well as full general surgical training.  Board-certified colon and rectal surgeons complete residencies in general surgery and colon and rectal surgery, and pass intensive examinations conducted by the [employer] of [employer] and the American Board of Colon and Rectal Surgery. They are well-versed in the treatment of both benign and malignant diseases of the colon, rectum, anus, and small intestine, and are able to perform routine screening examinations and surgically treat conditions if indicated to do so. If you have any questions before your surgery, please call Dr Cristina Martell office at [phone].

## 2022-10-04 NOTE — Clinical Note
We will plan for surgery. His wife requests that we do it in early January due to her own medical issues, which is reasonable. Thanks!  Latasha Mcgee

## 2022-10-10 ENCOUNTER — NURSE ONLY (OUTPATIENT)
Dept: FAMILY MEDICINE CLINIC | Age: 82
End: 2022-10-10
Payer: MEDICARE

## 2022-10-10 DIAGNOSIS — Z23 FLU VACCINE NEED: Primary | ICD-10-CM

## 2022-10-10 PROCEDURE — 90694 VACC AIIV4 NO PRSRV 0.5ML IM: CPT | Performed by: FAMILY MEDICINE

## 2022-10-10 PROCEDURE — G0008 ADMIN INFLUENZA VIRUS VAC: HCPCS | Performed by: FAMILY MEDICINE

## 2022-10-10 RX ORDER — DUTASTERIDE 0.5 MG/1
0.5 CAPSULE, LIQUID FILLED ORAL DAILY
Qty: 90 CAPSULE | Refills: 1 | Status: SHIPPED | OUTPATIENT
Start: 2022-10-10

## 2022-11-21 ENCOUNTER — TELEPHONE (OUTPATIENT)
Dept: SURGERY | Age: 82
End: 2022-11-21

## 2022-11-21 DIAGNOSIS — D12.8 ADENOMATOUS RECTAL POLYP: Primary | ICD-10-CM

## 2022-11-21 RX ORDER — ONDANSETRON 4 MG/1
4 TABLET, ORALLY DISINTEGRATING ORAL EVERY 8 HOURS PRN
Qty: 3 TABLET | Refills: 0 | Status: SHIPPED | OUTPATIENT
Start: 2022-11-21

## 2022-11-21 RX ORDER — NEOMYCIN SULFATE 500 MG/1
TABLET ORAL
Qty: 6 TABLET | Refills: 0 | Status: SHIPPED | OUTPATIENT
Start: 2022-11-21

## 2022-11-21 RX ORDER — METRONIDAZOLE 500 MG/1
TABLET ORAL
Qty: 3 TABLET | Refills: 0 | Status: SHIPPED | OUTPATIENT
Start: 2022-11-21

## 2022-11-21 NOTE — TELEPHONE ENCOUNTER
Patient has been scheduled for:    Procedure: Robo LAR  Date: 1/9/23  Time: 7:15AM  Arrival: 5:30AM  [facility]  [facility] Aspir[facility] 7 days   Prep? #2 e-mailed and my-chart    Pre-op? pcp    Post-op Appt? 1/24/23 at 10:45AM     Patient advised they will be admitted. Orders routed to surgery scheduling. Instructions have been mailed/emailed to:  [email or web address]

## 2022-12-14 ENCOUNTER — OFFICE VISIT (OUTPATIENT)
Dept: FAMILY MEDICINE CLINIC | Age: 82
End: 2022-12-14
Payer: MEDICARE

## 2022-12-14 VITALS
WEIGHT: 170.4 LBS | OXYGEN SATURATION: 95 % | SYSTOLIC BLOOD PRESSURE: 124 MMHG | DIASTOLIC BLOOD PRESSURE: 78 MMHG | HEIGHT: 64 IN | HEART RATE: 61 BPM | BODY MASS INDEX: 29.09 KG/M2

## 2022-12-14 DIAGNOSIS — E78.2 MIXED HYPERLIPIDEMIA: ICD-10-CM

## 2022-12-14 DIAGNOSIS — G31.84 MILD COGNITIVE IMPAIRMENT: ICD-10-CM

## 2022-12-14 DIAGNOSIS — R73.03 PREDIABETES: ICD-10-CM

## 2022-12-14 DIAGNOSIS — Z01.818 PREOP EXAMINATION: Primary | ICD-10-CM

## 2022-12-14 DIAGNOSIS — Z99.89 OSA ON CPAP: ICD-10-CM

## 2022-12-14 DIAGNOSIS — G47.33 OSA ON CPAP: ICD-10-CM

## 2022-12-14 PROCEDURE — 1123F ACP DISCUSS/DSCN MKR DOCD: CPT | Performed by: FAMILY MEDICINE

## 2022-12-14 PROCEDURE — 99213 OFFICE O/P EST LOW 20 MIN: CPT | Performed by: FAMILY MEDICINE

## 2022-12-14 ASSESSMENT — ENCOUNTER SYMPTOMS: RESPIRATORY NEGATIVE: 1

## 2022-12-14 NOTE — PROGRESS NOTES
Sheeba Dunn (:  1940) is a 78 y.o. male,Established patient, here for evaluation of the following chief complaint(s):  Pre-op Exam (Patient is scheduled for robotic low anterior resection on 23 with Dr. Chucky Pandey at [place] [address]. EKG completed at cardiologist yesterday.)         ASSESSMENT/PLAN:  Mandy Norton was seen today for pre-op exam.    Diagnoses and all orders for this visit:    Preop examination  Medically patient is at acceptable risk to undergo planned surgery. Prediabetes  Stable with diet  Mild cognitive impairment  Has been stable  Have discussed meds but would like to hold  Wife aware to watch for changes post op  Mixed hyperlipidemia  Stable on crestor  SHAINA on CPAP   Compliant with cpap    No follow-ups on file. Subjective   SUBJECTIVE/OBJECTIVE:  VADIM  Pt is a of 78 y.o. male comes in today with   Chief Complaint   Patient presents with    Pre-op Exam     Patient is scheduled for robotic low anterior resection on 23 with Dr. Chucky Pandey at [place] [address]. EKG completed at cardiologist yesterday. No personal or family history of adverse reaction to anesthesia or bleeding tendency. No change in exercise tolerance. Cleared by cardiology y/d but will have zio monitor placed to r/o afib    Vitals:    22 0901   BP: 124/78   Site: Right Upper Arm   Position: Sitting   Cuff Size: Medium Adult   Pulse: 61   SpO2: 95%   Weight: 170 lb 6.4 oz (77.3 kg)   Height: 5' 3.5\" (1.613 m)      Allergies   Allergen Reactions    Demerol     Oxycodone Hcl     Meperidine Nausea And Vomiting       Current Outpatient Medications on File Prior to Visit   Medication Sig Dispense Refill    metroNIDAZOLE (FLAGYL) 500 MG tablet Take one tablet by mouth 3 times on the day prior to surgery. Take one tablet at 1pm, 2pm and 9pm. (Patient not taking: Reported on 2022) 3 tablet 0    neomycin (MYCIFRADIN) 500 MG tablet Take two tablets 3 times the day before surgery.  Take two tablets at 1pm, two tablets at 2pm and two tablets at 9pm. (Patient not taking: Reported on 12/14/2022) 6 tablet 0    ondansetron (ZOFRAN ODT) 4 MG disintegrating tablet Place 1 tablet under the tongue every 8 hours as needed for Nausea or Vomiting (Patient not taking: Reported on 12/14/2022) 3 tablet 0    dutasteride (AVODART) 0.5 MG capsule Take 1 capsule by mouth daily 90 capsule 1    rosuvastatin (CRESTOR) 10 MG tablet TAKE 1 TABLET BY MOUTH EVERY DAY 90 tablet 3    tamsulosin (FLOMAX) 0.4 MG capsule Take 2 capsules by mouth daily 180 capsule 3    aspirin 81 MG tablet Take 81 mg by mouth daily      Flaxseed, Linseed, 1000 MG CAPS Take 1 capsule by mouth 2 times daily. multivitamin (THERAGRAN) per tablet Take 1 tablet by mouth daily. Psyllium (METAMUCIL PO) Take 2 Units by mouth daily. 2 TSP. No current facility-administered medications on file prior to visit. Past Medical History:   Diagnosis Date    Aortic valve disorder     Arthritis     Blood transfusion     BPH (benign prostatic hypertrophy)     Cancer (HCC)     SKIN.     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    Diverticulosis of colon     Diverticulosis of large intestine 5/15/2010    Essential hypertension 1/17/2013    History of GI bleed 4/2012    Hydronephrosis, left 7/2/2016    Hyperlipidemia     Left carpal tunnel syndrome 1/26/2022    Mitral valve disorder     MSSA (methicillin susceptible Staphylococcus aureus) septicemia ([address] 3/2012    Osteoarthritis     neck, back, knees    Partial small bowel obstruction ([address]     Staph aureus infection 3/10/12    blood    TIA (transient ischemic attack) 5/2012       Past Surgical History:   Procedure Laterality Date    AORTIC VALVE SURGERY      CARDIAC SURGERY  6/2012    angiogram-no blockage    COLONOSCOPY      COLONOSCOPY N/A 8/31/2022    COLONOSCOPY WITH BIOPSY performed by Breanna Ortiz MD at [place] [address] N/A 8/31/2022    COLONOSCOPY POLYPECTOMY SNARE/COLD BIOPSY [facility] N/A 8/31/2022    COLONOSCOPY SUBMUCOSAL/ SPOT  INJECTION performed by Joi Jay MD at [facility]. JOINT REPLACEMENT      RIGHT KNEE TOTAL 11/05, left knee 2006    KNEE PROSTHESIS REMOVAL Left     KNEE SURGERY  4/2012    to remove blood clot on right knee    KNEE SURGERY  3/2012    for MSSA infection bilat. knees    MITRAL VALVE SURGERY      OTHER SURGICAL HISTORY  07/03/2016    CYSTOSCOPY, LEFT URETEROSCOPY STONE MANIPULATION WITH LASER,    TOTAL KNEE ARTHROPLASTY      bilat    UPPER GASTROINTESTINAL ENDOSCOPY N/A 8/31/2022    EGD BIOPSY performed by Joi Jay MD at [facility] History     Socioeconomic History    Marital status:      Spouse name: None    Number of children: None    Years of education: None    Highest education level: None   Tobacco Use    Smoking status: Never    Smokeless tobacco: Never   Substance and Sexual Activity    Alcohol use: Yes     Alcohol/week: 4.0 - 5.0 standard drinks     Types: 2 - 3 Glasses of wine, 2 Cans of beer per week     Comment: drinks every other day    Drug use: No     Social Determinants of Health     Financial Resource Strain: Low Risk     Difficulty of Paying Living Expenses: Not hard at all   Food Insecurity: No Food Insecurity    Worried About Running Out of Food in the Last Year: Never true    Ran Out of Food in the Last Year: Never true   Physical Activity: Insufficiently Active    Days of Exercise per Week: 4 days    Minutes of Exercise per Session: 30 min       Social History     Substance and Sexual Activity   Drug Use No       Family History   Problem Relation Age of Onset    Stroke Mother     Stroke Father     Heart Disease Father     Cancer Brother         skin      Review of Systems   Constitutional: Negative. Respiratory: Negative. Cardiovascular: Negative. Hematological:  Does not bruise/bleed easily. Objective   Physical Exam  Constitutional:       General: He is not in acute distress. Appearance: Normal appearance. He is well-developed. He is not diaphoretic. HENT:      Head: Normocephalic and atraumatic. Mouth/Throat:      Mouth: Mucous membranes are moist.      Pharynx: Oropharynx is clear. Eyes:      General: No scleral icterus. Neck:      Thyroid: No thyromegaly. Trachea: No tracheal deviation. Cardiovascular:      Rate and Rhythm: Normal rate and regular rhythm. Heart sounds: Normal heart sounds. No murmur heard. Pulmonary:      Effort: Pulmonary effort is normal.      Breath sounds: Normal breath sounds. No wheezing or rales. Musculoskeletal:      Cervical back: Normal range of motion and neck supple. Lymphadenopathy:      Head:      Right side of head: No submental or submandibular adenopathy. Left side of head: No submental or submandibular adenopathy. Cervical: No cervical adenopathy. Skin:     General: Skin is warm and dry. Neurological:      Mental Status: He is alert and oriented to person, place, and time. Cranial Nerves: No cranial nerve deficit. Psychiatric:         Behavior: Behavior normal.         Thought Content: Thought content normal.         Judgment: Judgment normal.            An electronic signature was used to authenticate this note.     --Ana Gresham MD

## 2023-01-03 ENCOUNTER — TELEPHONE (OUTPATIENT)
Dept: SURGERY | Age: 83
End: 2023-01-03

## 2023-01-04 NOTE — TELEPHONE ENCOUNTER
Patient's wife, Gaby Goldberg, called Aleksandr Arevalo back to let her know that they spoke with the surgeon today, Dr. Shlomo Connor. Gaby Goldberg did not leave any specifics.      Gaby Goldberg would like Aleksandr rAevalo to call her back at [phone]

## 2023-01-04 NOTE — PROGRESS NOTES
[facility] PRE-SURGICAL TESTING INSTRUCTIONS                      PRIOR TO PROCEDURE DATE:    1. PLEASE FOLLOW ANY INSTRUCTIONS GIVEN TO YOU PER YOUR SURGEON. 2. Arrange for someone to drive you home and be with you for the first 24 hours after discharge for your safety after your procedure for which you received sedation. Ensure it is someone we can share information with regarding your discharge. NOTE: At this time ONLY 2 ADULTS may accompany you   One person ENCOURAGED to stay at hospital entire time if outpatient surgery      3. You must contact your surgeon for instructions IF:  You are taking any blood thinners, aspirin, anti-inflammatory or vitamins. There is a change in your physical condition such as a cold, fever, rash, cuts, sores, or any other infection, especially near your surgical site. 4. Do not drink alcohol the day before or day of your procedure. Do not use any recreational marijuana at least 24 hours or street drugs (heroin, cocaine) at minimum 5 days prior to your procedure. 5. A Pre-Surgical History and Physical MUST be completed WITHIN 30 DAYS OR LESS prior to your procedure. by your Physician or an Urgent Care        THE DAY OF YOUR PROCEDURE:  1. Follow instructions for ARRIVAL TIME as DIRECTED BY YOUR SURGEON. 2. Enter the MAIN entrance from [address] and follow the signs to the free Parking [address] [employer] or [employer] (offered free of charge 7 am-5pm). 3. Enter the Main Entrance of the hospital (do not enter from the lower level of the parking garage). Upon entrance, check in with the  at the surgical information desk on your LEFT. Bring your insurance card and photo ID to register      4. DO NOT EAT ANYTHING 8 hours prior to arrival for surgery. You may have up to 8 ounces of water 4 hours prior to your arrival for surgery.    NOTE: ALL Gastric, Bariatric & Bowel surgery patients - you MUST follow your surgeon's instructions regarding eating/ drinking as you will have very specific instructions to follow. If you did not receive these, call your surgeon's office immediately. 5. MEDICATIONS:  Take the following medications with a SMALL sip of water: PER SURGEON  Use your usual dose of inhalers the morning of surgery. BRING your rescue inhaler with you to hospital.   Anesthesia does NOT want you to take insulin the morning of surgery. They will control your blood sugar while you are at the hospital. Please contact your ordering physician for instructions regarding your insulin the night before your procedure. If you have an insulin pump, please keep it set on basal rate. Bariatric patient's call your surgeon if on diabetic medications as some may need to be stopped 1 week prior to surgery    6. Do not swallow additional water when brushing teeth. No gum, candy, mints, or ice chips. Refrain from smoking or at least decrease the amount on day of surgery. 7. Morning of surgery:   Take a shower with an antibacterial soap (i.e., Safeguard or Dial) OR your physician may have instructed you to use Hibiclens. Dress in loose, comfortable clothing appropriate for redressing after your procedure. Do not wear jewelry (including body piercings), make-up (especially NO eye make-up), fingernail polish (NO toenail polish if foot/leg surgery), lotion, powders, or metal hairclips. Do not shave or wax for 72 hours prior to procedure near your operative site. Shaving with a razor can irritate your skin and make it easier to develop an infection. On the day of your procedure, any hair that needs to be removed near the surgical site will be 'clipped' by a healthcare worker using a special clipper designed to avoid skin irritation. 8. Dentures, glasses, or contacts will need to be removed before your procedure. Bring cases for your glasses, contacts, dentures, or hearing aids to protect them while you are in surgery.       9. If you use a CPAP, please bring it with you on the day of your procedure. 10. We recommend that valuable personal belongings such as cash, cell phones, e-tablets, or jewelry, be left at home during your stay. The hospital will not be responsible for valuables that are not secured in the hospital safe. However, if your insurance requires a co-pay, you may want to bring a method of payment, i.e., Check or credit card, if you wish to pay your co-pay the day of surgery. 11. If you are to stay overnight, you may bring a bag with personal items. Please have any large items you may need brought in by your family after your arrival to your hospital room. 12. If you have a Living Will or Durable Power of , please bring a copy on the day of your procedure. How we keep you safe and work to prevent surgical site infections:   1. Health care workers should always check your ID bracelet to verify your name and birth date. You will be asked many times to state your name, date of birth, and allergies. 2. Health care workers should always clean their hands with soap or alcohol gel before providing care to you. It is okay to ask anyone if they cleaned their hands before they touch you. 3. You will be actively involved in verifying the type of procedure you are having and ensuring the correct surgical site. This will be confirmed multiple times prior to your procedure. Do NOT nicole your surgery site UNLESS instructed to by your surgeon. 4. When you are in the operating room, your surgical site will be cleansed with a special soap, and in most cases, you will be given an antibiotic before the surgery begins. What to expect AFTER your procedure? 1. Immediately following your procedure, your will be taken to the PACU for the first phase of your recovery. Your nurse will help you recover from any potential side effects of anesthesia, such as extreme drowsiness, changes in your vital signs or breathing patterns.  Nausea, headache, muscle aches, or sore throat may also occur after anesthesia. Your nurse will help you manage these potential side effects. 2. For comfort and safety, arrange to have someone at home with you for the first 24 hours after discharge. 3. You and your family will be given written instructions about your diet, activity, dressing care, medications, and return visits. 4. Once at home, should issues with nausea, pain, or bleeding occur, or should you notice any signs of infection, you should call your surgeon. 5. Always clean your hands before and after caring for your wound. Do not let your family touch your surgery site without cleaning their hands. 6. Narcotic pain medications can cause significant constipation. You may want to add a stool softener to your postoperative medication schedule or speak to your surgeon on how best to manage this SIDE EFFECT. SPECIAL INSTRUCTIONS     Thank you for allowing us to care for you. We strive to exceed your expectations in the delivery of care and service provided to you and your family. If you need to contact the [place] [address] staff for any reason, please call us at [phone]    Instructions reviewed with patient during preadmission testing phone interview.   Meño Giles RN.1/4/2023 .10:17 AM      ADDITIONAL EDUCATIONAL INFORMATION REVIEWED PER PHONE WITH YOU AND/OR YOUR FAMILY:  No Hibiclens® Bathing Instructions   Yes Antibacterial Soap

## 2023-01-04 NOTE — TELEPHONE ENCOUNTER
Spoke with wife, advised her we have him down for 1/9 and his case was moved to 2:00 pm.    Per his cardiologist, he had to wear a heart monitor and they found an irregular rhythm. They are seeing a heart surgeon today and will let us know if he can proceed with surgery on 1/9/23.

## 2023-01-04 NOTE — PROGRESS NOTES
Place patient label inside box (if no patient label, complete below)  Name:  :  MR#:   [address] / PROCEDURE  I (we)KRYSTLE (Patient Name) authorize Arelis Alamo (Provider / Claudeen Meter) and/or such assistants as may be selected by him/her, to perform the following operation/procedure(s): ROBOTIC LOW ANTERIOR RESECTION       Note: If unable to obtain consent prior to an emergent procedure, document the emergent reason in the medical record. This procedure has been explained to my (our) satisfaction and included in the explanation was: The intended benefit, nature, and extent of the procedure to be performed; The significant risks involved and the probability of success; Alternative procedures and methods of treatment; The dangers and probable consequences of such alternatives (including no procedure or treatment); The expected consequences of the procedure on my future health; Whether other qualified individuals would be performing important surgical tasks and/or whether  would be present to advise or support the procedure. I (we) understand that there are other risks of infection and other serious complications in the pre-operative/procedural and postoperative/procedural stages of my (our) care. I (we) have asked all of the questions which I (we) thought were important in deciding whether or not to undergo treatment or diagnosis. These questions have been answered to my (our) satisfaction. I (we) understand that no assurance can be given that the procedure will be a success, and no guarantee or warranty of success has been given to me (us). It has been explained to me (us) that during the course of the operation/procedure, unforeseen conditions may be revealed that necessitate extension of the original procedure(s) or different procedure(s) than those set forth in Paragraph 1.  I (we) authorize and request that the above-named physician, his/her assistants or his/her designees, perform procedures as necessary and desirable if deemed to be in my (our) best interest.     Revised 8/2/2021                                                                          Page 1 of 2         I acknowledge that health care personnel may be observing this procedure for the purpose of medical education or other specified purposes as may be necessary as requested and/or approved by my (our) physician. I (we) consent to the disposal by the hospital Pathologist of the removed tissue, parts or organs in accordance with hospital policy. I do ____ do not ____ consent to the use of a local infiltration pain blocking agent that will be used by my provider/surgical provider to help alleviate pain during my procedure. I do ____ do not ____ consent to an emergent blood transfusion in the case of a life-threatening situation that requires blood components to be administered. This consent is valid for 24 hours from the beginning of the procedure. This patient does ____ or does not ____ currently have a DNR status/order. If DNR order is in place, obtain Addendum to the Surgical Consent for ALL Patients with a DNR Order to address jeff-operative status for limited intervention or DNR suspension.      I have read and fully understand the above Consent for Operation/Procedure and that all blanks were completed before I signed the consent.   _____________________________       _____________________      ____/____am/pm  Signature of Patient or legal representative      Printed Name / Relationship            Date / Time   ____________________________       _____________________      ____/____am/pm  Witness to Signature                                    Printed Name                    Date / Time    If patient is unable to sign or is a minor, complete the following)  Patient is a minor, ____ years of age, or unable to sign because: ______________________________________________________________________________________________    If a phone consent is obtained, consent will be documented by using two health care professionals, each affirming that the consenting party has no questions and gives consent for the procedure discussed with the physician/provider.   _____________________          ____________________       _____/_____am/pm   2nd witness to phone consent        Printed name           Date / Time    Informed Consent:  I have provided the explanation described above in section 1 to the patient and/or legal representative.  I have provided the patient and/or legal representative with an opportunity to ask any questions about the proposed operation/procedure.   ___________________________          ____________________         ____/____am/pm  Provider / Proceduralist                            Printed name            Date / Time  Revised 8/2/2021                                                                      Page 2 of 2

## 2023-01-04 NOTE — TELEPHONE ENCOUNTER
Pt wife called again with questions about the patients upcoming surgery.      Please call: [phone]

## 2023-01-05 ENCOUNTER — TELEPHONE (OUTPATIENT)
Dept: SURGERY | Age: 83
End: 2023-01-05

## 2023-01-05 NOTE — TELEPHONE ENCOUNTER
I have placed a reminder call to patient for upcoming procedure. Did you speak directly to patient or leave a voicemail? Spoke to wife    Prep?      NPO 12AM  Prep #2    Patient will be admitted    Arrive at the main entrance [facility] at 11:00AM

## 2023-01-06 ENCOUNTER — TELEPHONE (OUTPATIENT)
Dept: SURGERY | Age: 83
End: 2023-01-06

## 2023-01-06 ENCOUNTER — ANESTHESIA (OUTPATIENT)
Dept: OPERATING ROOM | Age: 83
End: 2023-01-06
Payer: MEDICARE

## 2023-01-06 ENCOUNTER — ANESTHESIA EVENT (OUTPATIENT)
Dept: OPERATING ROOM | Age: 83
End: 2023-01-06
Payer: MEDICARE

## 2023-01-06 ENCOUNTER — HOSPITAL ENCOUNTER (OUTPATIENT)
Age: 83
Setting detail: SURGERY ADMIT
Discharge: HOME | End: 2023-01-06
Attending: SURGERY | Admitting: SURGERY
Payer: MEDICARE

## 2023-01-06 VITALS
SYSTOLIC BLOOD PRESSURE: 161 MMHG | HEIGHT: 64 IN | TEMPERATURE: 96.9 F | BODY MASS INDEX: 28.65 KG/M2 | WEIGHT: 167.8 LBS | OXYGEN SATURATION: 98 % | DIASTOLIC BLOOD PRESSURE: 91 MMHG | HEART RATE: 62 BPM | RESPIRATION RATE: 18 BRPM

## 2023-01-06 LAB
ABO/RH: NORMAL
ANTIBODY SCREEN: NORMAL

## 2023-01-06 PROCEDURE — C1729 CATH, DRAINAGE: HCPCS | Performed by: SURGERY

## 2023-01-06 PROCEDURE — 2709999900 HC NON-CHARGEABLE SUPPLY: Performed by: SURGERY

## 2023-01-06 PROCEDURE — 2720000010 HC SURG SUPPLY STERILE: Performed by: SURGERY

## 2023-01-06 PROCEDURE — 2580000003 HC RX 258: Performed by: ANESTHESIOLOGY

## 2023-01-06 PROCEDURE — 86850 RBC ANTIBODY SCREEN: CPT

## 2023-01-06 PROCEDURE — 86900 BLOOD TYPING SEROLOGIC ABO: CPT

## 2023-01-06 PROCEDURE — 86901 BLOOD TYPING SEROLOGIC RH(D): CPT

## 2023-01-06 RX ORDER — SODIUM CHLORIDE 0.9 % (FLUSH) 0.9 %
5-40 SYRINGE (ML) INJECTION EVERY 12 HOURS SCHEDULED
Status: DISCONTINUED | OUTPATIENT
Start: 2023-01-06 | End: 2023-01-06 | Stop reason: HOSPADM

## 2023-01-06 RX ORDER — CIPROFLOXACIN 2 MG/ML
200 INJECTION, SOLUTION INTRAVENOUS EVERY 12 HOURS
Status: DISCONTINUED | OUTPATIENT
Start: 2023-01-06 | End: 2023-01-06

## 2023-01-06 RX ORDER — SODIUM CHLORIDE, SODIUM LACTATE, POTASSIUM CHLORIDE, CALCIUM CHLORIDE 600; 310; 30; 20 MG/100ML; MG/100ML; MG/100ML; MG/100ML
INJECTION, SOLUTION INTRAVENOUS CONTINUOUS
Status: DISCONTINUED | OUTPATIENT
Start: 2023-01-06 | End: 2023-01-06 | Stop reason: HOSPADM

## 2023-01-06 RX ORDER — METRONIDAZOLE 500 MG/100ML
500 INJECTION, SOLUTION INTRAVENOUS EVERY 6 HOURS
Status: DISCONTINUED | OUTPATIENT
Start: 2023-01-06 | End: 2023-01-06

## 2023-01-06 RX ORDER — ENOXAPARIN SODIUM 100 MG/ML
40 INJECTION SUBCUTANEOUS ONCE
Status: DISCONTINUED | OUTPATIENT
Start: 2023-01-06 | End: 2023-01-06 | Stop reason: HOSPADM

## 2023-01-06 RX ORDER — CIPROFLOXACIN 2 MG/ML
400 INJECTION, SOLUTION INTRAVENOUS EVERY 12 HOURS
Status: DISCONTINUED | OUTPATIENT
Start: 2023-01-06 | End: 2023-01-06 | Stop reason: HOSPADM

## 2023-01-06 RX ORDER — SODIUM CHLORIDE 0.9 % (FLUSH) 0.9 %
5-40 SYRINGE (ML) INJECTION PRN
Status: DISCONTINUED | OUTPATIENT
Start: 2023-01-06 | End: 2023-01-06 | Stop reason: HOSPADM

## 2023-01-06 RX ORDER — LIDOCAINE HYDROCHLORIDE 10 MG/ML
1 INJECTION, SOLUTION EPIDURAL; INFILTRATION; INTRACAUDAL; PERINEURAL
Status: DISCONTINUED | OUTPATIENT
Start: 2023-01-06 | End: 2023-01-06 | Stop reason: HOSPADM

## 2023-01-06 RX ORDER — METRONIDAZOLE 500 MG/100ML
500 INJECTION, SOLUTION INTRAVENOUS EVERY 6 HOURS
Status: DISCONTINUED | OUTPATIENT
Start: 2023-01-06 | End: 2023-01-06 | Stop reason: HOSPADM

## 2023-01-06 RX ADMIN — SODIUM CHLORIDE, POTASSIUM CHLORIDE, SODIUM LACTATE AND CALCIUM CHLORIDE: 600; 310; 30; 20 INJECTION, SOLUTION INTRAVENOUS at 11:53

## 2023-01-06 ASSESSMENT — PAIN - FUNCTIONAL ASSESSMENT: PAIN_FUNCTIONAL_ASSESSMENT: 0-10

## 2023-01-06 NOTE — DISCHARGE INSTRUCTIONS
Your surgery was cancelled today per your request for another cardiology consult. We have provided a phone number for Dr. Janet Dave, [facility] Wang Cardiologist, for you to call and make appointment for consult.

## 2023-01-06 NOTE — TELEPHONE ENCOUNTER
Spoke with Jeromy Sanches [facility] nurse. Patient has been approved for surgery, they are trying to get the documentation asap from [address] [place] [address] and fax it to [facility]     I called [facility] same day to let them know. Gave them the office # and fax # for same day surgery. His office marked this as a high urgent priority.

## 2023-01-06 NOTE — PROGRESS NOTES
Pt here for surgery today, pt stated that he recently was ordered a holter monitor for cardiac clearance, pt was then called and told he needed to go into see Chandrakant Mayorga for arrhythmia changes, pt was not comfortable with opinion and would like a second opinion from a [facility] cardiologist for clearance, we have not received cardiac clearance from Dr. Ronaldo Hawthorne office and he is not available until [phone] to send clearance, Dr. Musa Go made aware and in agreement to cancel procedure and reschedule.

## 2023-01-06 NOTE — PROGRESS NOTES
This RN took over care of pt from [facility]. Dr. Abida Thomas came and spoke with pt and family and family decided to seek another cardiology consult before proceeding with surgery. Case cancelled for today. IV removed, and dressing applied. This RN provided the phone number for Dr. Krupa Scott, [id number] [facility] cardiologist, for new consult after Dr. Abida Thomas discussed this with pt and family after they stated they wanted to return to [facility] cardiology. Provided pt a snack of clinton crackers and ice water prior to discharge. OR Charge Gail is aware of cancellation, as well as Clinical Coordinator Leonora and anesthesiologist Dr. Yordy Shah. Pt and family ambulated to entrance to discharge.

## 2023-01-06 NOTE — H&P
PRE-OP/PRE-PROCEDURE H&P    Visit Date: 1/6/2023    History:     Wan Martinez is a 78 y.o. male who presents today for procedure. See my/PCP/oncologist office notes for indications and details. 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     Mixed hyperlipidemia     Allergic rhinitis due to pollen     Mild cognitive impairment     SHAINA on CPAP     Prediabetes     Left carpal tunnel syndrome         Current Facility-Administered Medications:     lidocaine PF 1 % injection 1 mL, 1 mL, IntraDERmal, Once PRN, Abigail Hart MD    lactated ringers infusion, , IntraVENous, Continuous, Abigail Hart MD    sodium chloride flush 0.9 % injection 5-40 mL, 5-40 mL, IntraVENous, 2 times per day, Abigail Hart MD    sodium chloride flush 0.9 % injection 5-40 mL, 5-40 mL, IntraVENous, PRN, Abigail Hart MD    ciprofloxacin (CIPRO) IVPB 200 mg, 200 mg, IntraVENous, Q12H **AND** metronidazole (FLAGYL) 500 mg in 0.9% NaCl 100 mL IVPB premix, 500 mg, IntraVENous, Q6H, Joseph Crespo MD    enoxaparin (LOVENOX) injection 40 mg, 40 mg, SubCUTAneous, Once, Joseph Crespo MD  Prior to Admission medications    Medication Sig Start Date End Date Taking?  Authorizing Provider   dutasteride (AVODART) 0.5 MG capsule Take 1 capsule by mouth daily 10/10/22   Jessica Robb MD   rosuvastatin (CRESTOR) 10 MG tablet TAKE 1 TABLET BY MOUTH EVERY DAY 8/23/22   Jessica Robb MD   tamsulosin (FLOMAX) 0.4 MG capsule Take 2 capsules by mouth daily 8/19/22   Cait Leach MD   aspirin 81 MG tablet Take 81 mg by mouth daily    Historical Provider, MD   Flaxseed, Linseed, 1000 MG CAPS Take 1 capsule by mouth 2 times daily. Historical Provider, MD   multivitamin [facility]) per tablet Take 1 tablet by mouth daily. Historical Provider, MD   Psyllium (METAMUCIL PO) Take 2 Units by mouth daily. 2 TSP. 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    Diverticulosis of colon     Diverticulosis of large intestine 05/15/2010    Essential hypertension 01/17/2013    History of blood transfusion     History of GI bleed 04/2012    Hydronephrosis, left 07/02/2016    Hyperlipidemia     Left carpal tunnel syndrome 01/26/2022    Mitral valve disorder     MSSA (methicillin susceptible Staphylococcus aureus) septicemia ([facility] [address] 03/2012    Osteoarthritis     neck, back, knees    Partial small bowel obstruction (HCC)     Sleep apnea     Staph aureus infection 03/10/2012    blood    TIA (transient ischemic attack) 05/2012     Past Surgical History:   Procedure Laterality Date    AORTIC VALVE SURGERY      REPAIR    CARDIAC SURGERY  06/2012    angiogram-no blockage    COLONOSCOPY      COLONOSCOPY N/A 08/31/2022    COLONOSCOPY WITH BIOPSY performed by Shyam Borjas MD at [facility] N/A 08/31/2022    COLONOSCOPY POLYPECTOMY SNARE/COLD BIOPSY performed by Shyam Borjas MD at [facility] N/A 08/31/2022    COLONOSCOPY SUBMUCOSAL/ SPOT  INJECTION performed by Shyam Borjas MD at [place] [address].     JOINT REPLACEMENT      RIGHT KNEE TOTAL 11/05, left knee 2006    KNEE PROSTHESIS REMOVAL Left     KNEE SURGERY  04/2012    to remove blood clot on right knee    KNEE SURGERY  03/2012    for MSSA infection bilat. knees    MITRAL VALVE SURGERY      REPAIR    OTHER SURGICAL HISTORY  07/03/2016    CYSTOSCOPY, LEFT URETEROSCOPY STONE MANIPULATION WITH LASER,    TOTAL KNEE ARTHROPLASTY      bilat    UPPER GASTROINTESTINAL ENDOSCOPY N/A 08/31/2022    EGD BIOPSY performed by Awa Pacheco MD at [address]           Physical Exam:     BP (!) 161/91   Pulse 62   Temp 96.9 °F (36.1 °C) (Temporal)   Resp 18   Ht 5' 3.75\" (1.619 m)   Wt 167 lb 12.8 oz (76.1 kg)   SpO2 98%   BMI 29.03 kg/m²  Body mass index is 29.03 kg/m². Constitutional: Appears well-developed and well-nourished. Head: Normocephalic, atraumatic. Eyes: No scleral icterus. Vision intact grossly. ENT: Hearing grossly intact. No facial deformity. Neck: Normal range of motion. No tracheal deviation. Cardiovascular: Normal rate. No peripheral edema. Pulmonary/Chest: Effort normal. No respiratory distress. No wheezes. No use of accessory muscles. Musculoskeletal: No gross deformity. Neurological: Alert and oriented to person, place, and time. No gross deficits. Skin: Skin is dry. No rash noted. No pallor. Psychiatric: Normal mood and affect. Behavior normal. Oriented to person, place, and time. Abdomen: soft, NTTP, non distended    Recent labs and imaging reviewed as necessary. Assessment/Plan:       Proceed as planned for robotic LAR    Risks/benefits/alternatives of procedure/surgery discussed with patient and any present family members (or appropriate guardian) and understanding verbalized. All questions answered. Patient wishes to proceed.     Electronically signed by Jesusa Felty, MD on 1/6/2023 at 11:28 AM

## 2023-01-06 NOTE — ANESTHESIA PRE PROCEDURE
Department of Anesthesiology  Preprocedure Note       Name:  Jeff Berman   Age:  82 y.o.  :  1940                                          MRN:  [id number]         Date:  2023      Surgeon: Surgeon(s):  Jeferson Cavazos MD    Procedure: Procedure(s):  ROBOTIC LOW ANTERIOR RESECTION    Medications prior to admission:   Prior to Admission medications    Medication Sig Start Date End Date Taking? Authorizing Provider   dutasteride (AVODART) 0.5 MG capsule Take 1 capsule by mouth daily 10/10/22   Blas Arzate MD   rosuvastatin (CRESTOR) 10 MG tablet TAKE 1 TABLET BY MOUTH EVERY DAY 22   Blas Arzate MD   tamsulosin (FLOMAX) 0.4 MG capsule Take 2 capsules by mouth daily 22   Blas Arzate MD   aspirin 81 MG tablet Take 81 mg by mouth daily    Historical Provider, MD   Flaxseed Linseed, 1000 MG CAPS Take 1 capsule by mouth 2 times daily.    Historical Provider, MD   multivitamin (THERAGRAN) per tablet Take 1 tablet by mouth daily.    Historical Provider, MD   Psyllium (METAMUCIL PO) Take 2 Units by mouth daily. 2 TSP. 5/15/10   Historical Provider, MD       Current medications:    No current facility-administered medications for this visit.     No current outpatient medications on file.     Facility-Administered Medications Ordered in Other Visits   Medication Dose Route Frequency Provider Last Rate Last Admin   • lidocaine PF 1 % injection 1 mL  1 mL IntraDERmal Once PRN Shad Storey MD       • lactated ringers infusion   IntraVENous Continuous Shad Storey MD       • sodium chloride flush 0.9 % injection 5-40 mL  5-40 mL IntraVENous 2 times per day Shad Storey MD       • sodium chloride flush 0.9 % injection 5-40 mL  5-40 mL IntraVENous PRN Shad Sotrey MD       • ciprofloxacin (CIPRO) IVPB 200 mg  200 mg IntraVENous Q12H Jeferson Cavazos MD        And   • metronidazole (FLAGYL) 500 mg in 0.9% NaCl 100 mL IVPB premix  500 mg IntraVENous Q6H Jeferson Cavazos MD       •  enoxaparin (LOVENOX) injection 40 mg  40 mg SubCUTAneous Once Len Paris MD           Allergies: Allergies   Allergen Reactions    Demerol Nausea And Vomiting    Oxycodone Hcl Nausea And Vomiting    Meperidine Nausea And Vomiting       Problem List:    Patient Active Problem List   Diagnosis Code    Osteoarthrosis involving lower leg [id number]    Actinic keratosis L57.0    Hearing loss H91.90    BPH with obstruction/lower urinary tract symptoms N40.1, N13.8    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    TIA (transient ischemic attack) G45.9    Degenerative joint disease of knee M17.9    Joint prosthesis infection or inflammation ([address] T84.50XA    Mitral valve disease I05.9    Aortic valve disease I35.9    H/O endocarditis Z86.79    Infection and inflammatory reaction due to internal joint prosthesis ([address] T84.50XA    History of total knee replacement Z96.659    Squamous cell cancer of scalp and skin of neck C44.42    Mixed hyperlipidemia E78.2    Allergic rhinitis due to pollen J30.1    Mild cognitive impairment G31.84    SHAINA on CPAP G47.33, Z99.89    Prediabetes R73.03    Left carpal tunnel syndrome G56.02       Past Medical History:        Diagnosis Date    Aortic valve disorder     Arthritis     Blood transfusion     BPH (benign prostatic hypertrophy)     Cancer (HCC)     SKIN.    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    Diverticulosis of colon     Diverticulosis of large intestine 05/15/2010    Essential hypertension 01/17/2013    History of blood transfusion     History of GI bleed 04/2012    Hydronephrosis, left 07/02/2016    Hyperlipidemia     Left carpal tunnel syndrome 01/26/2022    Mitral valve disorder     MSSA (methicillin susceptible Staphylococcus aureus) septicemia ([address] 03/2012    Osteoarthritis     neck, back, knees    Partial small bowel obstruction (HCC)     Sleep apnea     Staph aureus infection 03/10/2012    blood    TIA (transient ischemic attack) 05/2012       Past Surgical History:        Procedure Laterality Date    AORTIC VALVE SURGERY      REPAIR    CARDIAC SURGERY  06/2012    angiogram-no blockage    COLONOSCOPY      COLONOSCOPY N/A 08/31/2022    COLONOSCOPY WITH BIOPSY performed by Carlene Winchester MD at [address] [place] [address] N/A 08/31/2022    COLONOSCOPY POLYPECTOMY SNARE/COLD BIOPSY performed by Carlene Winchester MD at [facility] COLONOSCOPY N/A 08/31/2022    COLONOSCOPY SUBMUCOSAL/ SPOT  INJECTION performed by [facility]ene Winchester MD at [facility].  JOINT REPLACEMENT      RIGHT KNEE TOTAL 11/05, left knee 2006    KNEE PROSTHESIS REMOVAL Left     KNEE SURGERY  04/2012    to remove blood clot on right knee    KNEE SURGERY  03/2012    for MSSA infection bilat. knees    MITRAL VALVE SURGERY      REPAIR    OTHER SURGICAL HISTORY  07/03/2016    CYSTOSCOPY, LEFT URETEROSCOPY STONE MANIPULATION WITH LASER,    TOTAL KNEE ARTHROPLASTY      bilat    UPPER GASTROINTESTINAL ENDOSCOPY N/A 08/31/2022    EGD BIOPSY performed by Carlene Winchester MD at [address] [place] [address] History:    Social History     Tobacco Use    Smoking status: Never    Smokeless tobacco: Never   Substance Use Topics    Alcohol use: Not Currently     Comment: DAILY 1                                Counseling given: Not Answered      Vital Signs (Current): There were no vitals filed for this visit.                                            BP Readings from Last 3 Encounters:   01/06/23 (!) 161/91   12/14/22 124/78   10/04/22 (!) 141/89       NPO Status:                                                                                 BMI:   Wt Readings from Last 3 Encounters:   01/06/23 167 lb 12.8 oz (76.1 kg)   12/14/22 170 lb 6.4 oz (77.3 kg)   10/04/22 171 lb (77.6 kg)     There is no height or weight on file to calculate BMI.    CBC:   Lab Results Component Value Date/Time    WBC 7.2 02/09/2022 10:53 AM    RBC 5.04 02/09/2022 10:53 AM    HGB 15.1 02/09/2022 10:53 AM    HCT 45.4 02/09/2022 10:53 AM    MCV 90.0 02/09/2022 10:53 AM    RDW 14.6 02/09/2022 10:53 AM     02/09/2022 10:53 AM       CMP:   Lab Results   Component Value Date/Time     04/27/2022 09:44 AM    K 5.1 04/27/2022 09:44 AM    K 4.6 06/12/2019 05:53 PM    CL 96 04/27/2022 09:44 AM    CO2 26 04/27/2022 09:44 AM    BUN 10 04/27/2022 09:44 AM    CREATININE 0.8 09/30/2022 11:09 AM    CREATININE 0.9 04/27/2022 09:44 AM    GFRAA >60 09/30/2022 11:09 AM    GFRAA >60 01/11/2013 11:26 AM    AGRATIO 1.7 04/27/2022 09:44 AM    LABGLOM >60 09/30/2022 11:09 AM    LABGLOM 50 04/22/2013 01:30 PM    GLUCOSE 93 04/27/2022 09:44 AM    GLUCOSE 97 12/13/2011 08:14 AM    PROT 6.7 04/27/2022 09:44 AM    PROT 7.9 01/11/2013 11:26 AM    CALCIUM 9.2 04/27/2022 09:44 AM    BILITOT 0.8 04/27/2022 09:44 AM    ALKPHOS 121 04/27/2022 09:44 AM    AST 32 04/27/2022 09:44 AM    ALT 20 04/27/2022 09:44 AM       POC Tests: No results for input(s): POCGLU, POCNA, POCK, POCCL, POCBUN, POCHEMO, POCHCT in the last 72 hours.     Coags:   Lab Results   Component Value Date/Time    PROTIME 12.6 01/11/2013 11:26 AM    INR 1.11 01/11/2013 11:26 AM    APTT 30.8 07/13/2012 01:20 PM       HCG (If Applicable): No results found for: PREGTESTUR, PREGSERUM, HCG, HCGQUANT     ABGs: No results found for: PHART, PO2ART, QAO2CGD, MUS8MOZ, BEART, U1MQVEAO     Type & Screen (If Applicable):  Lab Results   Component Value Date    LABABO A 02/21/2013    [address] Negative 07/13/2012       Drug/Infectious Status (If Applicable):  No results found for: HIV, HEPCAB    COVID-19 Screening (If Applicable): No results found for: COVID19        Anesthesia Evaluation  Patient summary reviewed and Nursing notes reviewed no history of anesthetic complications:   Airway: Mallampati: III  TM distance: >3 FB   Neck ROM: full  Mouth opening: > = 3 FB Dental: normal exam         Pulmonary:normal exam    (+) sleep apnea:                             Cardiovascular:  Exercise tolerance: no interval change,   (+) hypertension:, valvular problems/murmurs (mitral repair): AI,     (-)  angina, orthopnea and PND    ECG reviewed  Rhythm: regular  Rate: normal  Echocardiogram reviewed         Beta Blocker:  Not on Beta Blocker         Neuro/Psych:   (+) neuromuscular disease:, TIA,             GI/Hepatic/Renal:             Endo/Other:                     Abdominal:             Vascular: Other Findings:             Anesthesia Plan      general     ASA 3       Induction: intravenous. Anesthetic plan and risks discussed with patient. Plan discussed with CRNA.                     Neal Duke DO   1/6/2023

## 2023-01-10 ENCOUNTER — NURSE ONLY (OUTPATIENT)
Dept: CARDIOLOGY CLINIC | Age: 83
End: 2023-01-10

## 2023-01-10 ENCOUNTER — OFFICE VISIT (OUTPATIENT)
Dept: CARDIOLOGY CLINIC | Age: 83
End: 2023-01-10
Payer: MEDICARE

## 2023-01-10 VITALS
WEIGHT: 171.6 LBS | BODY MASS INDEX: 29.69 KG/M2 | HEART RATE: 56 BPM | DIASTOLIC BLOOD PRESSURE: 76 MMHG | SYSTOLIC BLOOD PRESSURE: 124 MMHG

## 2023-01-10 DIAGNOSIS — E78.2 MIXED HYPERLIPIDEMIA: ICD-10-CM

## 2023-01-10 DIAGNOSIS — I35.9 AORTIC VALVE DISEASE: ICD-10-CM

## 2023-01-10 DIAGNOSIS — I45.5 SINUS PAUSE: ICD-10-CM

## 2023-01-10 DIAGNOSIS — G45.9 TIA (TRANSIENT ISCHEMIC ATTACK): Primary | ICD-10-CM

## 2023-01-10 DIAGNOSIS — I05.9 MITRAL VALVE DISEASE: ICD-10-CM

## 2023-01-10 PROCEDURE — 99214 OFFICE O/P EST MOD 30 MIN: CPT | Performed by: INTERNAL MEDICINE

## 2023-01-10 PROCEDURE — 93000 ELECTROCARDIOGRAM COMPLETE: CPT | Performed by: INTERNAL MEDICINE

## 2023-01-10 PROCEDURE — 1124F ACP DISCUSS-NO DSCNMKR DOCD: CPT | Performed by: INTERNAL MEDICINE

## 2023-01-10 PROCEDURE — 93242 EXT ECG>48HR<7D RECORDING: CPT | Performed by: INTERNAL MEDICINE

## 2023-01-10 ASSESSMENT — ENCOUNTER SYMPTOMS
ABDOMINAL DISTENTION: 0
EYE DISCHARGE: 0
COUGH: 0
VOMITING: 0
SHORTNESS OF BREATH: 0
BACK PAIN: 0
FACIAL SWELLING: 0
CHEST TIGHTNESS: 0
COLOR CHANGE: 0
WHEEZING: 0
ABDOMINAL PAIN: 0
BLOOD IN STOOL: 0

## 2023-01-10 NOTE — PROGRESS NOTES
[facility]     Outpatient Cardiology         Patient Name:  Melissa Portillo  Requesting Physician: No admitting provider for patient encounter. Primary Care Physician: Johnny Connelly MD    Reason for Consultation/Chief Complaint:   Chief Complaint   Patient presents with    Hyperlipidemia         History of Present Illness:    HPI     Marcia Benito a 78 y.o. male with PMH of sleep apnea, HLD, S/p MVR and AVR in 7/2012, HTN, PACs, GI bleed, TIA. Here to establish care and CV risk assessment for colon mass surgery. Had previous cardiac monitor that showed multiple pauses for a total of 22 with back-to-back episodes. Overall he was referred to EP although wanted to transfer care to our institution. He is completely asymptomatic from a cardiovascular perspective. No symptoms are new for angina or heart failure. HLD, on Crestor. No side effects  HTN, not on BP meds. PMH  Past Medical History:   Diagnosis Date    Aortic valve disorder     Arthritis     Blood transfusion     BPH (benign prostatic hypertrophy)     Cancer (HCC)     SKIN.     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    Diverticulosis of colon     Diverticulosis of large intestine 05/15/2010    Essential hypertension 01/17/2013    History of blood transfusion     History of GI bleed 04/2012    Hydronephrosis, left 07/02/2016    Hyperlipidemia     Left carpal tunnel syndrome 01/26/2022    Mitral valve disorder     MSSA (methicillin susceptible Staphylococcus aureus) septicemia ([facility] 03/2012    Osteoarthritis     neck, back, knees    Partial small bowel obstruction (HCC)     Sleep apnea     Staph aureus infection 03/10/2012    blood    TIA (transient ischemic attack) 05/2012       PSH  Past Surgical History:   Procedure Laterality Date    AORTIC VALVE SURGERY      REPAIR    CARDIAC SURGERY  06/2012    angiogram-no blockage    COLONOSCOPY      COLONOSCOPY N/A 08/31/2022    COLONOSCOPY WITH BIOPSY performed by Abdirizak Littlejohn MD at [facility] N/A 08/31/2022    COLONOSCOPY POLYPECTOMY SNARE/COLD BIOPSY performed by Abdirizak Littlejohn MD at [facility] N/A 08/31/2022    COLONOSCOPY SUBMUCOSAL/ SPOT  INJECTION performed by Abdirizak Littlejohn MD at [facility]. JOINT REPLACEMENT      RIGHT KNEE TOTAL 11/05, left knee 2006    KNEE PROSTHESIS REMOVAL Left     KNEE SURGERY  04/2012    to remove blood clot on right knee    KNEE SURGERY  03/2012    for MSSA infection bilat. knees    MITRAL VALVE SURGERY      REPAIR    OTHER SURGICAL HISTORY  07/03/2016    CYSTOSCOPY, LEFT URETEROSCOPY STONE MANIPULATION WITH LASER,    TOTAL KNEE ARTHROPLASTY      bilat    UPPER GASTROINTESTINAL ENDOSCOPY N/A 08/31/2022    EGD BIOPSY performed by Abdirizak Littlejohn MD at [address] [place] [address] Drive [facility]  Social History     Tobacco Use    Smoking status: Never    Smokeless tobacco: Never   Substance Use Topics    Alcohol use: Yes     Comment: DAILY 1 martini    Drug use: No       Family History  Family History   Problem Relation Age of Onset    Stroke Mother     Stroke Father     Heart Disease Father     Cancer Brother         skin       Allergies   Allergies   Allergen Reactions    Demerol Nausea And Vomiting    Oxycodone Hcl Nausea And Vomiting    Meperidine Nausea And Vomiting       Medications:     Home Medications:  Were reviewed and are listed in nursing record. and/or listed below    Prior to Admission medications    Medication Sig Start Date End Date Taking?  Authorizing Provider   dutasteride (AVODART) 0.5 MG capsule Take 1 capsule by mouth daily 10/10/22  Yes Liyah Hays MD   rosuvastatin (CRESTOR) 10 MG tablet TAKE 1 TABLET BY MOUTH EVERY DAY 8/23/22  Yes Liyah Hays MD   tamsulosin [facility]) 0.4 MG capsule Take 2 capsules by mouth daily 8/19/22  Yes Liyah Hays MD   aspirin 81 MG tablet Take 81 mg by mouth daily Yes Historical Provider, MD   Flaxseed, Linseed, 1000 MG CAPS Take 1 capsule by mouth 2 times daily. Yes Historical Provider, MD   multivitamin [facility]) per tablet Take 1 tablet by mouth daily. Yes Historical Provider, MD   Psyllium (METAMUCIL PO) Take 2 Units by mouth daily. 2 TSP. 5/15/10  Yes Historical Provider, MD        Review of Systems   Constitutional:  Negative for activity change, appetite change, diaphoresis, fatigue, fever and unexpected weight change. HENT:  Negative for congestion, facial swelling, mouth sores and nosebleeds. Eyes:  Negative for discharge and visual disturbance. Respiratory:  Negative for cough, chest tightness, shortness of breath and wheezing. Cardiovascular:  Negative for chest pain, palpitations and leg swelling. Gastrointestinal:  Negative for abdominal distention, abdominal pain, blood in stool and vomiting. Endocrine: Negative for cold intolerance, heat intolerance and polyuria. Genitourinary:  Negative for difficulty urinating, dysuria, frequency and hematuria. Musculoskeletal:  Negative for back pain, joint swelling, myalgias and neck pain. Skin:  Negative for color change, pallor and rash. Allergic/Immunologic: Negative for immunocompromised state. Neurological:  Negative for dizziness, syncope, weakness, light-headedness, numbness and headaches. Hematological:  Negative for adenopathy. Does not bruise/bleed easily. Psychiatric/Behavioral:  Negative for behavioral problems, confusion, decreased concentration and suicidal ideas. The patient is not nervous/anxious.       Vitals:    01/10/23 1214   BP: 124/76   Pulse: 56    Weight: 171 lb 9.6 oz (77.8 kg)       Vitals:    01/10/23 1214   BP: 124/76   Pulse: 56   Weight: 171 lb 9.6 oz (77.8 kg)       BP Readings from Last 3 Encounters:   01/10/23 124/76   01/06/23 (!) 161/91   12/14/22 124/78       Wt Readings from Last 3 Encounters:   01/10/23 171 lb 9.6 oz (77.8 kg)   01/06/23 167 lb 12.8 oz (76.1 kg)   12/14/22 170 lb 6.4 oz (77.3 kg)       Physical Exam  Constitutional:       General: He is not in acute distress. Appearance: He is well-developed. He is not diaphoretic. HENT:      Head: Normocephalic and atraumatic. Eyes:      Pupils: Pupils are equal, round, and reactive to light. Neck:      Thyroid: No thyromegaly. Vascular: No JVD. Cardiovascular:      Rate and Rhythm: Normal rate and regular rhythm. Chest Wall: PMI is not displaced. Heart sounds: Normal heart sounds, S1 normal and S2 normal. No murmur heard. No friction rub. No gallop. Pulmonary:      Effort: Pulmonary effort is normal. No respiratory distress. Breath sounds: Normal breath sounds. No stridor. No wheezing or rales. Chest:      Chest wall: No tenderness. Abdominal:      General: Bowel sounds are normal. There is no distension. Palpations: Abdomen is soft. Tenderness: There is no abdominal tenderness. There is no guarding or rebound. Musculoskeletal:         General: No tenderness. Normal range of motion. Cervical back: Normal range of motion. Lymphadenopathy:      Cervical: No cervical adenopathy. Skin:     General: Skin is warm and dry. Findings: No erythema or rash. Neurological:      Mental Status: He is alert and oriented to person, place, and time. Coordination: Coordination normal.   Psychiatric:         Behavior: Behavior normal.         Thought Content:  Thought content normal.         Judgment: Judgment normal.       Labs:       Lab Results   Component Value Date    WBC 7.2 02/09/2022    HGB 15.1 02/09/2022    HCT 45.4 02/09/2022    MCV 90.0 02/09/2022     02/09/2022     Lab Results   Component Value Date     (L) 04/27/2022    K 5.1 04/27/2022    CL 96 (L) 04/27/2022    CO2 26 04/27/2022    BUN 10 04/27/2022    CREATININE 0.8 09/30/2022    GLUCOSE 93 04/27/2022    CALCIUM 9.2 04/27/2022    PROT 6.7 04/27/2022    LABALBU 4.2 04/27/2022 BILITOT 0.8 04/27/2022    ALKPHOS 121 04/27/2022    AST 32 04/27/2022    ALT 20 04/27/2022    LABGLOM >60 09/30/2022    GFRAA >60 09/30/2022    AGRATIO 1.7 04/27/2022    GLOB 2.9 07/14/2021         Lab Results   Component Value Date    CHOL 138 04/27/2022    CHOL 140 01/21/2022    CHOL 148 07/14/2021     Lab Results   Component Value Date    TRIG 51 04/27/2022    TRIG 58 01/21/2022    TRIG 52 07/14/2021     Lab Results   Component Value Date    HDL 49 04/27/2022    HDL 51 01/21/2022    HDL 49 07/14/2021     Lab Results   Component Value Date    LDLCALC 79 04/27/2022    LDLCALC 77 01/21/2022    LDLCALC 89 07/14/2021     Lab Results   Component Value Date    LABVLDL 10 04/27/2022    LABVLDL 12 01/21/2022    LABVLDL 10 07/14/2021     No results found for: [facility]    Lab Results   Component Value Date    INR 1.11 01/11/2013    INR 1.2 (H) 07/14/2012    INR 1.3 (H) 07/13/2012    PROTIME 12.6 01/11/2013    PROTIME 15.2 (H) 07/14/2012    PROTIME 16.0 (H) 07/13/2012       The ASCVD Risk score (Richie DK, et al., 2019) failed to calculate for the following reasons:     The 2019 ASCVD risk score is only valid for ages 36 to 78      Imaging:       Last ECG (if available, Personally interpreted):  Normal sinus rhythm, first-degree block, old septal infarct    Last Monitor/Holter (if available): 12/15/22  Analysis time: 330 hr   Mean HR: 75 bpm   Maximum HR: 125 bpm   Minimum HR: 62 bpm     Ventricular prematurities - total: 1561 Ventricular prematurities - pairs: 14   Ventricular prematurities - triplets: 3 Ventricular prematurities - runs: 1 VT longest run:4 beats   VT fastest run: 154 beats or   Supraventricular prematurities - PPOUJ:[id number] Supraventricular prematurities - pairs: [phone] Supraventricular prematurities - runs: 55     Supraventricular longest run:13 beats or 6.5 sec   Supraventricular fastest run:128 beats at or 2 sec   Longest pause: 3.4 sec   Symptom diary not returned    Last Stress (if available): 1/9/19  Conclusions        Summary    Overall findings represent a low risk scan. There is normal isotope uptake at stress and rest. There is no evidence of    myocardial ischemia or scar. Normal LV size and systolic function. Non-diagnostic EKG response due to failure to reach target heart rate. Last Cath (if available): 6/28/12  Angiographic Findings:  Right dominant system  Left Main:  Normal     Left Anterior Descending:  Minimal mid luminal irregularities     Circumflex:  Mild luminal irregularities     Right Coronary:  Normal     Left Ventriculogram:  Not performed. Pressures (mm Hg):   Right Atrium:  3/1 (1)      Right Ventricle:  23/0, 2     Pulmonary Artery:  25/3 (13)      Pulmonary Artery Wedge:  9/5 (2)      Cardiac Output and Index: Thermodilution C.O.:  6.0 L/min  Thermodilution C. I.:   3.5 L/min/m2     Conclusions/Recommendations:   -Non-obstructive coronary disease  -Normal cardiac output  -Normal right-sided pressures     Last TTE/ELAYNE(if available): 8/13/21  Summary   Left ventricular cavity size is normal. There is slight asymmetric   hypertrophy of the basal septum. Left ventricular function is normal with   ejection fraction estimated at 55-60%. Normal diastolic function. Mitral valve repair noted. Trivial aortic regurgitation is present. The right ventricle is mildly enlarged. Right ventricular systolic function   visually appears normal.   Estimated pulmonary artery systolic pressure is at 33 mmHg assuming a right   atrial pressure of 3 mmHg. Last CMR  (if available):    Last Coronary Artery Calcium Score: Ankle-brachial index:    Carotid ultrasound screening:    Abdominal aortic aneurysm screening:       Assessment / Plan:     Mitral valve disease   Had repair. Working properly    Aortic valve disease  Had repair. Working properly    Mixed hyperlipidemia  On Crestor. No side effects    Sinus pause  Likely will need a pacemaker. Follow with EP.   Plan for 1 week monitor and obtain records. From a cardiovascular perspective I believe he is okay to proceed with surgery prior to pacemaker implantation. Will discuss with EP and colorectal surgery first.    Follow up in 1 months. I had the opportunity to review the clinical symptoms and presentation of Larinda Crigler. Patient's allergies and medications were reviewed and updated. Patient's past medical, surgical, social and family history were reviewed and updated. Patient's testing including laboratory, ECGs, monitor, imaging (TTE,ELAYNE,CMR,cath) were reviewed. Tobacco use was discussed with the patient and educated on the negative effects. I have asked the patient to not utilize these agents. All questions and concerns were addressed to the patient/family. Alternatives to my treatment were discussed. The note was completed using EMR. Every effort wasmade to ensure accuracy; however, inadvertent computerized transcription errors may be present. Thank you for allowing me to participate in thecare or [address] [place] [address].  Janet Dave MD, [facility] [address]ne [address]

## 2023-01-23 PROCEDURE — 93244 EXT ECG>48HR<7D REV&INTERPJ: CPT | Performed by: INTERNAL MEDICINE

## 2023-01-24 ENCOUNTER — TELEPHONE (OUTPATIENT)
Dept: CARDIOLOGY CLINIC | Age: 83
End: 2023-01-24

## 2023-01-24 NOTE — TELEPHONE ENCOUNTER
Spoke with pt's wife. Reviewed results of monitor with her. Scheduled OV with [facility] on 1/26 as pt needs colon resection per Dr. Susana Templeton ASA (originally scheduled on 1/20, but canceled due to needing CC).

## 2023-01-24 NOTE — TELEPHONE ENCOUNTER
Pt would like a call back regarding monitor , and  to discuss upcoming procedures colonoscopy/pacemaker.

## 2023-01-25 NOTE — H&P (VIEW-ONLY)
[address]   Cardiac Electrophysiology Consultation   Date: 1/26/2023  Reason for Consultation:   Consult Requesting Physician: No att. providers found     Chief Complaint:   Chief Complaint   Patient presents with    New Patient     High grade AVB      HPI: Chris Doyle is a 78 y.o. male, referred by Dr. Star Barrios for cardiac clearance. PMH significant for s/p AV repair (2012), s/p MV repair, HTN, HLD, TIA, follows Dr. Lady Garzon [place] [facility] [place]. Pt was diagnosed with precancerous colon CA, requiring colon resection. Echo (12/2022) showed EF 50% with concern for AF due to irregular rhythm during exam. Zio monitor (12/2022) showed 278 episodes of high grade AVB. He consulted with Dr. Meseret Wang and a PPM was recommended, but the pt would like a second opinion. His surgery was cancelled. Pt wore a repeat CAM (1/2023) that showed episodes of Mobitz II AVB. No AF was seen on either monitors. Interval History: Today, he is here for recommendation following monitors. He really doesn't feel poorly. Denies complaints of palpitations, lightheadedness, dizziness, CP, SOB, orthopnea, presyncope, or syncope. Assessment:  High grade AVB  PAT, nonsustained  NSVT  S/p AV and MV repair  HTN, stable    HLD, on statin  TIA, on ASA    Plan:  Patient have nonsustained ventricular tachycardia, atrial tachycardia and high-grade AV block. He also have colon cancer and pending colon resection. His colon surgery has been delayed secondary to his monitor findings.   Schedule pt for implantation of dual chamber PPM urgently as his colon resection is on hold due to needing PPM  2 weeks following implant of PPM, he may proceed with colon resection with low cardiovascular risk  Follow up in 1 week with device tech for wound and device check  Follow up in 1 month and 3 months with EP NP with device check    The risks and benefits of a PPM implantation were discussed at length with the patient, Much time was spent with the patient in a shared-decision making approach, discussing the pathophysiology of the patient's diagnosis of high grade AVB, the risk of presyncope or syncope, the utility of a PPM, the benefit and risks of the implantation of a PPM, the benefits and risks of living with a PPM, the lifestyle changes that come along with having a PPM, and the logistics of a PPM care and generator changes that go along with having a PPM.    The risks including, but not limited to, the risks of vascular injury, bleeding, infection, device malfunction, lead dislodgement, radiation exposure, injury to cardiac and surrounding structures (including pneumothorax), stroke, myocardial infarction and death were discussed in detail. All of the topics above were covered with the patient. A literature packet from our office was also given to the patient to provide supplementary information for the patient as it pertains to PPM implantation, lifestyle changes, and the benefits and risks of undergoing the implantation and living with a PPM.     The patient meets a Class I indication for a PPM implantation for the diagnosis of high grade AVB. Will proceed with scheduling a Medtronic 2-chamber PPM implantation for high grade AVB. Past Medical History:   Diagnosis Date    Aortic valve disorder     Arthritis     Blood transfusion     BPH (benign prostatic hypertrophy)     Cancer (HCC)     SKIN.     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    Diverticulosis of colon     Diverticulosis of large intestine 05/15/2010    Essential hypertension 01/17/2013    History of blood transfusion     History of GI bleed 04/2012    Hydronephrosis, left 07/02/2016    Hyperlipidemia     Left carpal tunnel syndrome 01/26/2022    Mitral valve disorder     MSSA (methicillin susceptible Staphylococcus aureus) septicemia ([facility] [address] 03/2012    Osteoarthritis     neck, back, knees    Partial small bowel obstruction (HCC)     Sleep apnea     Staph aureus infection 03/10/2012 blood    TIA (transient ischemic attack) 05/2012        Past Surgical History:   Procedure Laterality Date    AORTIC VALVE SURGERY      REPAIR    CARDIAC SURGERY  06/2012    angiogram-no blockage    COLONOSCOPY      COLONOSCOPY N/A 08/31/2022    COLONOSCOPY WITH BIOPSY performed by Glendy Rocha MD at [facility] 103 N/A 08/31/2022    COLONOSCOPY POLYPECTOMY SNARE/COLD BIOPSY performed by Glendy Rocha MD at [facility] 103 N/A 08/31/2022    COLONOSCOPY SUBMUCOSAL/ SPOT  INJECTION performed by Glendy Rocha MD at [facility] 429. JOINT REPLACEMENT      RIGHT KNEE TOTAL 11/05, left knee 2006    KNEE PROSTHESIS REMOVAL Left     KNEE SURGERY  04/2012    to remove blood clot on right knee    KNEE SURGERY  03/2012    for MSSA infection bilat. knees    MITRAL VALVE SURGERY      REPAIR    OTHER SURGICAL HISTORY  07/03/2016    CYSTOSCOPY, LEFT URETEROSCOPY STONE MANIPULATION WITH LASER,    TOTAL KNEE ARTHROPLASTY      bilat    UPPER GASTROINTESTINAL ENDOSCOPY N/A 08/31/2022    EGD BIOPSY performed by Glendy Rocha MD at [address]         Allergies: Allergies   Allergen Reactions    Demerol Nausea And Vomiting    Oxycodone Hcl Nausea And Vomiting    Meperidine Nausea And Vomiting       Medication:   Prior to Admission medications    Medication Sig Start Date End Date Taking? Authorizing Provider   dutasteride (AVODART) 0.5 MG capsule Take 1 capsule by mouth daily 10/10/22  Yes Iman Brown MD   rosuvastatin (CRESTOR) 10 MG tablet TAKE 1 TABLET BY MOUTH EVERY DAY 8/23/22  Yes Iman Brown MD   tamsulosin [facility]) 0.4 MG capsule Take 2 capsules by mouth daily 8/19/22  Yes Iman Brown MD   aspirin 81 MG tablet Take 81 mg by mouth daily   Yes Historical Provider, MD   Fldorene Linseed, 1000 MG CAPS Take 1 capsule by mouth 2 times daily.    Yes Historical Provider, MD   multivitamin [facility]) per tablet Take 1 tablet by mouth daily. Yes Historical Provider, MD   Psyllium (METAMUCIL PO) Take 2 Units by mouth daily. 2 TSP. 5/15/10  Yes Historical Provider, MD       Social History:   reports that he has never smoked. He has never used smokeless tobacco. He reports current alcohol use. He reports that he does not use drugs. Family History:  family history includes Cancer in his brother; Heart Disease in his father; Stroke in his father and mother. Reviewed. Denies family history of sudden cardiac death, arrhythmia, premature CAD    Review of System:    General ROS: negative for - chills, fever   Psychological ROS: negative for - anxiety or depression  Ophthalmic ROS: negative for - eye pain or loss of vision  ENT ROS: negative for - epistaxis, headaches, nasal discharge, sore throat   Allergy and Immunology ROS: negative for - hives, nasal congestion   Hematological and Lymphatic ROS: negative for - bleeding problems, blood clots, bruising or jaundice  Endocrine ROS: negative for - skin changes, temperature intolerance or unexpected weight changes  Respiratory ROS: negative for - cough, hemoptysis, pleuritic pain, SOB, sputum changes or wheezing  Cardiovascular ROS: Per HPI. Gastrointestinal ROS: negative for - abdominal pain, blood in stools, diarrhea, hematemesis, melena, nausea/vomiting or swallowing difficulty/pain  Genito-Urinary ROS: negative for - dysuria or incontinence  Musculoskeletal ROS: negative for - joint swelling or muscle pain  Neurological ROS: negative for - confusion, dizziness, gait disturbance, headaches, numbness/tingling, seizures, speech problems, tremors, visual changes or weakness  Dermatological ROS: negative for - rash    Physical Examination:  Vitals:    01/26/23 1038   BP: (!) 140/90   Pulse: 69       Constitutional: Oriented. No distress. Head: Normocephalic and atraumatic.    Mouth/Throat: Oropharynx is clear and moist.   Eyes: Conjunctivae normal. EOM are normal. Neck: Normal range of motion. Neck supple. No rigidity. No JVD present. Cardiovascular: Normal rate, regular rhythm, S1&S2 and intact distal pulses. Pulmonary/Chest: Bilateral respiratory sounds. No wheezes. No rhonchi. Abdominal: Soft. Bowel sounds present. No distension, No tenderness. Musculoskeletal: No tenderness. No edema    Lymphadenopathy: Has no cervical adenopathy. Neurological: Alert and oriented. Cranial nerve appears intact, No Gross deficit   Skin: Skin is warm and dry. No rash noted. Psychiatric: Has a normal mood, affect and behavior     Labs:  Reviewed. ECG: reviewed, NSR    Studies:   1. 7 day CAM (1/10-1/17/23): SR with average HR 79 (), episodes of Mobitz II AVB and Mobitz I AVB, 11 runs of AT with longest lasting 5 beats and rate up to 120    14 day Zio (12/13-12/27/22): SR with average HR 75 (), 278 episodes of high grade AVB, 12 pauses up to 3.4 sec, 1 run of NSVT lasting 4 beats with rate up to 154, 55 runs of SVT with longest lasting 13 beats and rate up to 128     2. Echo 12/2/22:   Summary:   Note: Rhythm appears irregular. Consider atrial fibrillation. Overall left ventricular ejection fraction is estimated to be 50-55%. Right ventricle is dilated and mildly hypokinetic. Aortic Valve leaflets appear thickened. No aortic regurgitation is present. No hemodynamically significant valvular aortic stenosis. Mild aortic root dilatation. Right atrium is dilated. Mild tricuspid regurgitation is present. 3. Stress Test 1/9/19:    Summary    Overall findings represent a low risk scan. There is normal isotope uptake at stress and rest. There is no evidence of    myocardial ischemia or scar. Normal LV size and systolic function. Non-diagnostic EKG response due to failure to reach target heart rate.          4. Cath 2012:  Angiographic Findings: Right dominant system  Left Main: Normal  Left Anterior Descending: Minimal mid luminal irregularities  Circumflex: Mild luminal irregularities  Right Coronary: Normal  Left Ventriculogram: Not performed.  Pressures (mm Hg):   Right Atrium: 3/1 (1)   Right Ventricle: 23/0, 2  Pulmonary Artery: 25/3 (13)   Pulmonary Artery Wedge: 9/5 (2)   Cardiac Output and Index:   Thermodilution C.O.: 6.0 L/min  Thermodilution C.I.: 3.5 L/min/m2    The MCOT, echocardiogram, stress test, and coronary angiography/PCI were reviewed by myself and used for my plan of care.     - The patient is counseled to follow a low salt diet to assure blood pressure remains controlled for cardiovascular risk factor modification.   - The patient is counseled to avoid excess caffeine, and energy drinks as this may exacerbated ectopy and arrhythmia.   - The patient is counseled to get regular exercise 3-5 times per week to control cardiovascular risk factors.   - The patient is counseled to lose weigt to control cardiovascular risk factors.  -The patient is counseled about the health hazards of smoking including cardiovascular side effects and its impact on morbidity and mortality.      Thank you for allowing me to participate in the care of Jeff Berman. All questions and concerns were addressed to the patient/family. Alternatives to my treatment were discussed.     I, Charo Brenner RN, am scribing for and in the presence of Dr. Danay Plummer. 01/26/23 10:58 AM  PANKAJ Sen Uma Lakshmanadoss MD, personally performed the services prescribed in this documentation as scribed by Ms. Charo Brenner RN in my presence and it is both accurate and complete.         Marlene Plummer MD  Cardiac Electrophysiology  [facility]

## 2023-01-25 NOTE — PROGRESS NOTES
[address]   Cardiac Electrophysiology Consultation   Date: 1/26/2023  Reason for Consultation:   Consult Requesting Physician: No att. providers found     Chief Complaint:   Chief Complaint   Patient presents with    New Patient     High grade AVB      HPI: You Melendez is a 78 y.o. male, referred by Dr. Lamont Crespo for cardiac clearance. PMH significant for s/p AV repair (2012), s/p MV repair, HTN, HLD, TIA, follows Dr. Michelle Austin at [facility]. Pt was diagnosed with precancerous colon CA, requiring colon resection. Echo (12/2022) showed EF 50% with concern for AF due to irregular rhythm during exam. Zio monitor (12/2022) showed 278 episodes of high grade AVB. He consulted with Dr. Ronny Eddy and a PPM was recommended, but the pt would like a second opinion. His surgery was cancelled. Pt wore a repeat CAM (1/2023) that showed episodes of Mobitz II AVB. No AF was seen on either monitors. Interval History: Today, he is here for recommendation following monitors. He really doesn't feel poorly. Denies complaints of palpitations, lightheadedness, dizziness, CP, SOB, orthopnea, presyncope, or syncope. Assessment:  High grade AVB  PAT, nonsustained  NSVT  S/p AV and MV repair  HTN, stable    HLD, on statin  TIA, on ASA    Plan:  Patient have nonsustained ventricular tachycardia, atrial tachycardia and high-grade AV block. He also have colon cancer and pending colon resection. His colon surgery has been delayed secondary to his monitor findings.   Schedule pt for implantation of dual chamber PPM urgently as his colon resection is on hold due to needing PPM  2 weeks following implant of PPM, he may proceed with colon resection with low cardiovascular risk  Follow up in 1 week with device tech for wound and device check  Follow up in 1 month and 3 months with EP NP with device check    The risks and benefits of a PPM implantation were discussed at length with the patient, Much time was spent with the patient in a shared-decision making approach, discussing the pathophysiology of the patient's diagnosis of high grade AVB, the risk of presyncope or syncope, the utility of a PPM, the benefit and risks of the implantation of a PPM, the benefits and risks of living with a PPM, the lifestyle changes that come along with having a PPM, and the logistics of a PPM care and generator changes that go along with having a PPM.    The risks including, but not limited to, the risks of vascular injury, bleeding, infection, device malfunction, lead dislodgement, radiation exposure, injury to cardiac and surrounding structures (including pneumothorax), stroke, myocardial infarction and death were discussed in detail. All of the topics above were covered with the patient. A literature packet from our office was also given to the patient to provide supplementary information for the patient as it pertains to PPM implantation, lifestyle changes, and the benefits and risks of undergoing the implantation and living with a PPM.     The patient meets a Class I indication for a PPM implantation for the diagnosis of high grade AVB. Will proceed with scheduling a Medtronic 2-chamber PPM implantation for high grade AVB. Past Medical History:   Diagnosis Date    Aortic valve disorder     Arthritis     Blood transfusion     BPH (benign prostatic hypertrophy)     Cancer (HCC)     SKIN.     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    Diverticulosis of colon     Diverticulosis of large intestine 05/15/2010    Essential hypertension 01/17/2013    History of blood transfusion     History of GI bleed 04/2012    Hydronephrosis, left 07/02/2016    Hyperlipidemia     Left carpal tunnel syndrome 01/26/2022    Mitral valve disorder     MSSA (methicillin susceptible Staphylococcus aureus) septicemia ([facility] [address] 03/2012    Osteoarthritis     neck, back, knees    Partial small bowel obstruction (HCC)     Sleep apnea     Staph aureus infection 03/10/2012 blood    TIA (transient ischemic attack) 05/2012        Past Surgical History:   Procedure Laterality Date    AORTIC VALVE SURGERY      REPAIR    CARDIAC SURGERY  06/2012    angiogram-no blockage    COLONOSCOPY      COLONOSCOPY N/A 08/31/2022    COLONOSCOPY WITH BIOPSY performed by Franki Solorzano MD at [facility] 103 N/A 08/31/2022    COLONOSCOPY POLYPECTOMY SNARE/COLD BIOPSY performed by Franki Solorzano MD at [facility] 103 N/A 08/31/2022    COLONOSCOPY SUBMUCOSAL/ SPOT  INJECTION performed by Franki Solorzano MD at [facility] 429. JOINT REPLACEMENT      RIGHT KNEE TOTAL 11/05, left knee 2006    KNEE PROSTHESIS REMOVAL Left     KNEE SURGERY  04/2012    to remove blood clot on right knee    KNEE SURGERY  03/2012    for MSSA infection bilat. knees    MITRAL VALVE SURGERY      REPAIR    OTHER SURGICAL HISTORY  07/03/2016    CYSTOSCOPY, LEFT URETEROSCOPY STONE MANIPULATION WITH LASER,    TOTAL KNEE ARTHROPLASTY      bilat    UPPER GASTROINTESTINAL ENDOSCOPY N/A 08/31/2022    EGD BIOPSY performed by Franki Solorzano MD at [address] [place] [address]         Allergies: Allergies   Allergen Reactions    Demerol Nausea And Vomiting    Oxycodone Hcl Nausea And Vomiting    Meperidine Nausea And Vomiting       Medication:   Prior to Admission medications    Medication Sig Start Date End Date Taking? Authorizing Provider   dutasteride (AVODART) 0.5 MG capsule Take 1 capsule by mouth daily 10/10/22  Yes Mark Cohen MD   rosuvastatin (CRESTOR) 10 MG tablet TAKE 1 TABLET BY MOUTH EVERY DAY 8/23/22  Yes Mark Cohen MD   tamsulosin [facility]) 0.4 MG capsule Take 2 capsules by mouth daily 8/19/22  Yes Mark Cohen MD   aspirin 81 MG tablet Take 81 mg by mouth daily   Yes Historical Provider, MD   Flaxseed Linseed, 1000 MG CAPS Take 1 capsule by mouth 2 times daily.    Yes Historical Provider, MD   multivitamin [facility]) per tablet Take 1 tablet by mouth daily. Yes Historical Provider, MD   Psyllium (METAMUCIL PO) Take 2 Units by mouth daily. 2 TSP. 5/15/10  Yes Historical Provider, MD       Social History:   reports that he has never smoked. He has never used smokeless tobacco. He reports current alcohol use. He reports that he does not use drugs. Family History:  family history includes Cancer in his brother; Heart Disease in his father; Stroke in his father and mother. Reviewed. Denies family history of sudden cardiac death, arrhythmia, premature CAD    Review of System:    General ROS: negative for - chills, fever   Psychological ROS: negative for - anxiety or depression  Ophthalmic ROS: negative for - eye pain or loss of vision  ENT ROS: negative for - epistaxis, headaches, nasal discharge, sore throat   Allergy and Immunology ROS: negative for - hives, nasal congestion   Hematological and Lymphatic ROS: negative for - bleeding problems, blood clots, bruising or jaundice  Endocrine ROS: negative for - skin changes, temperature intolerance or unexpected weight changes  Respiratory ROS: negative for - cough, hemoptysis, pleuritic pain, SOB, sputum changes or wheezing  Cardiovascular ROS: Per HPI. Gastrointestinal ROS: negative for - abdominal pain, blood in stools, diarrhea, hematemesis, melena, nausea/vomiting or swallowing difficulty/pain  Genito-Urinary ROS: negative for - dysuria or incontinence  Musculoskeletal ROS: negative for - joint swelling or muscle pain  Neurological ROS: negative for - confusion, dizziness, gait disturbance, headaches, numbness/tingling, seizures, speech problems, tremors, visual changes or weakness  Dermatological ROS: negative for - rash    Physical Examination:  Vitals:    01/26/23 1038   BP: (!) 140/90   Pulse: 69       Constitutional: Oriented. No distress. Head: Normocephalic and atraumatic.    Mouth/Throat: Oropharynx is clear and moist.   Eyes: Conjunctivae normal. EOM are normal. Neck: Normal range of motion. Neck supple. No rigidity. No JVD present. Cardiovascular: Normal rate, regular rhythm, S1&S2 and intact distal pulses. Pulmonary/Chest: Bilateral respiratory sounds. No wheezes. No rhonchi. Abdominal: Soft. Bowel sounds present. No distension, No tenderness. Musculoskeletal: No tenderness. No edema    Lymphadenopathy: Has no cervical adenopathy. Neurological: Alert and oriented. Cranial nerve appears intact, No Gross deficit   Skin: Skin is warm and dry. No rash noted. Psychiatric: Has a normal mood, affect and behavior     Labs:  Reviewed. ECG: reviewed, NSR    Studies:   1. 7 day CAM (1/10-1/17/23): SR with average HR 79 (), episodes of Mobitz II AVB and Mobitz I AVB, 11 runs of AT with longest lasting 5 beats and rate up to 120    14 day Zio (12/13-12/27/22): SR with average HR 75 (), 278 episodes of high grade AVB, 12 pauses up to 3.4 sec, 1 run of NSVT lasting 4 beats with rate up to 154, 55 runs of SVT with longest lasting 13 beats and rate up to 128     2. Echo 12/2/22:   Summary:   Note: Rhythm appears irregular. Consider atrial fibrillation. Overall left ventricular ejection fraction is estimated to be 50-55%. Right ventricle is dilated and mildly hypokinetic. Aortic Valve leaflets appear thickened. No aortic regurgitation is present. No hemodynamically significant valvular aortic stenosis. Mild aortic root dilatation. Right atrium is dilated. Mild tricuspid regurgitation is present. 3. Stress Test 1/9/19:    Summary    Overall findings represent a low risk scan. There is normal isotope uptake at stress and rest. There is no evidence of    myocardial ischemia or scar. Normal LV size and systolic function. Non-diagnostic EKG response due to failure to reach target heart rate.          4. Cath 2012:  Angiographic Findings: Right dominant system  Left Main: Normal  Left Anterior Descending: Minimal mid luminal irregularities  Circumflex: Mild luminal irregularities  Right Coronary: Normal  Left Ventriculogram: Not performed. Pressures (mm Hg):   Right Atrium: 3/1 (1)   Right Ventricle: 23/0, 2  Pulmonary Artery: 25/3 (13)   Pulmonary Artery Wedge: 9/5 (2)   Cardiac Output and Index: Thermodilution C.O.: 6.0 L/min  Thermodilution C. I.: 3.5 L/min/m2    The MCOT, echocardiogram, stress test, and coronary angiography/PCI were reviewed by myself and used for my plan of care. - The patient is counseled to follow a low salt diet to assure blood pressure remains controlled for cardiovascular risk factor modification.   - The patient is counseled to avoid excess caffeine, and energy drinks as this may exacerbated ectopy and arrhythmia. - The patient is counseled to get regular exercise 3-5 times per week to control cardiovascular risk factors. - The patient is counseled to lose weigt to control cardiovascular risk factors. -The patient is counseled about the health hazards of smoking including cardiovascular side effects and its impact on morbidity and mortality. Thank you for allowing me to participate in the care of Loco Berger. All questions and concerns were addressed to the patient/family. Alternatives to my treatment were discussed. Gila Shaffer RN, am scribing for and in the presence of Dr. Priyanka Cadet. 01/26/23 10:58 AM  Harshal Cha RN    I, Elissa Romero MD, personally performed the services prescribed in this documentation as scribed by Ms. Harshal Cha RN in my presence and it is both accurate and complete.          Elissa Romero MD  Cardiac Electrophysiology  [address]

## 2023-01-26 ENCOUNTER — OFFICE VISIT (OUTPATIENT)
Dept: CARDIOLOGY CLINIC | Age: 83
End: 2023-01-26
Payer: MEDICARE

## 2023-01-26 ENCOUNTER — TELEPHONE (OUTPATIENT)
Dept: CARDIOLOGY CLINIC | Age: 83
End: 2023-01-26

## 2023-01-26 VITALS
HEART RATE: 69 BPM | BODY MASS INDEX: 29.76 KG/M2 | DIASTOLIC BLOOD PRESSURE: 90 MMHG | WEIGHT: 172 LBS | SYSTOLIC BLOOD PRESSURE: 140 MMHG

## 2023-01-26 DIAGNOSIS — I35.9 AORTIC VALVE DISEASE: ICD-10-CM

## 2023-01-26 DIAGNOSIS — I44.39 HIGH-GRADE ATRIOVENTRICULAR BLOCK: Primary | ICD-10-CM

## 2023-01-26 DIAGNOSIS — I47.1 PAT (PAROXYSMAL ATRIAL TACHYCARDIA) (HCC): ICD-10-CM

## 2023-01-26 DIAGNOSIS — E78.2 MIXED HYPERLIPIDEMIA: ICD-10-CM

## 2023-01-26 DIAGNOSIS — I44.1 MOBITZ TYPE 2 SECOND DEGREE ATRIOVENTRICULAR BLOCK: ICD-10-CM

## 2023-01-26 DIAGNOSIS — I47.29 NSVT (NONSUSTAINED VENTRICULAR TACHYCARDIA): ICD-10-CM

## 2023-01-26 DIAGNOSIS — G45.9 TIA (TRANSIENT ISCHEMIC ATTACK): ICD-10-CM

## 2023-01-26 DIAGNOSIS — I05.9 MITRAL VALVE DISEASE: ICD-10-CM

## 2023-01-26 PROCEDURE — 99205 OFFICE O/P NEW HI 60 MIN: CPT | Performed by: INTERNAL MEDICINE

## 2023-01-26 PROCEDURE — 93000 ELECTROCARDIOGRAM COMPLETE: CPT | Performed by: INTERNAL MEDICINE

## 2023-01-26 PROCEDURE — 1124F ACP DISCUSS-NO DSCNMKR DOCD: CPT | Performed by: INTERNAL MEDICINE

## 2023-01-26 NOTE — LETTER
[address]  EP Procedure Sheet  1/26/23           [id number]  Carlee Srivastava  1940    Physician: Dr. Cheryl Gray    EP Procedures  x Pacemaker implant--dual chamber  EP Study    ICD implant  Atrial flutter ablation     Biv implant  Atrial fibrillation ablation    Generator Change  SVT ablation    Lead revision  VT ablation    Lead extraction +/- upgrade  AVN ablation    Loop implant  Cardioversion     Other:   ELAYNE     Equipment  x Medtronic   LUCIO Mapping System    [facility]  [address] [address]  Cryo[employer]bl[employer]    B[employer]ron[employer]  Laser Lead Extraction    Special Equipment       EP Procedures Scheduling Request  Time Requested     Specific Day    Anesthesia xxx   CT surgery backup    Location [facility]     Pre-Procedure Labs / Imaging   PT/INR  Type & cross    CBC  Units PRBC    BMP/Mg  Units FFP    Venogram  CXR    Echo  Pulmonary CTA for Pulmonary vein mapping

## 2023-01-26 NOTE — TELEPHONE ENCOUNTER
Spoke with the wife and we got him schedule for the procedure.  We went over the instructions from his AVS on 1/26/2023 and she verbalized understanding,     Procedure - Dual Chamber PPM  Date: 1/31/2023  Arrival time: 12:30 pm   Procedure time: 2:00 pm     Teams updated / alerted cath lab

## 2023-01-26 NOTE — PATIENT INSTRUCTIONS
Implantation of Permanent Pacemaker    Date of Procedure:     Time of Arrival:     Cardiologist performing procedure: Dr. Rick Noland at [facility] [employer] through the main entrance. Check in at the Outpatient Diagnostic desk on the 1st floor. Do not eat or drink anything after midnight the night before the procedure. You may brush your teeth and rinse the morning of the procedure. Take your routine medications the morning of the procedure. However, if you are taking diabetic medications, please HOLD on the day of the procedure (including insulin). If you take Lantus insulin, take HALF of your usual dose the night before. Do NOT stop taking aspirin. Lab work is due within a week of the procedure. You do not need to be fasting for these labs. This can be done at the [address] [place] [address] at [address] Juan Jose Jimenez. Please use Hibiclens soap (or any antibacterial soap such as Dial) to wash neck, chest, and abdomen the night before (or the morning of) the procedure. Do not apply any lotion, powder, or deodorant after you shower and the morning of the procedure. Please bring a list of your medications to the hospital with you. You must have someone available to drive you home the same (or next) day. If you are discharged the same day, you will need someone to stay with you at home the night of the procedure. If you are unable to make this appointment, please call [phone] [facility], at [phone].

## 2023-01-27 ENCOUNTER — TELEPHONE (OUTPATIENT)
Dept: SURGERY | Age: 83
End: 2023-01-27

## 2023-01-27 DIAGNOSIS — I44.39 HIGH-GRADE ATRIOVENTRICULAR BLOCK: ICD-10-CM

## 2023-01-27 DIAGNOSIS — I47.29 NSVT (NONSUSTAINED VENTRICULAR TACHYCARDIA) (HCC): ICD-10-CM

## 2023-01-27 DIAGNOSIS — R00.2 PALPITATIONS: Primary | ICD-10-CM

## 2023-01-27 DIAGNOSIS — I44.1 MOBITZ TYPE 2 SECOND DEGREE ATRIOVENTRICULAR BLOCK: ICD-10-CM

## 2023-01-27 DIAGNOSIS — I47.1 PAT (PAROXYSMAL ATRIAL TACHYCARDIA) (HCC): ICD-10-CM

## 2023-01-27 LAB
A/G RATIO: 1.6 (ref 1.1–2.2)
ALBUMIN SERPL-MCNC: 3.9 G/DL (ref 3.4–5)
ALP BLD-CCNC: 99 U/L (ref 40–129)
ALT SERPL-CCNC: 19 U/L (ref 10–40)
ANION GAP SERPL CALCULATED.3IONS-SCNC: 11 MMOL/L (ref 3–16)
AST SERPL-CCNC: 27 U/L (ref 15–37)
BILIRUB SERPL-MCNC: 0.8 MG/DL (ref 0–1)
BUN BLDV-MCNC: 15 MG/DL (ref 7–20)
CALCIUM SERPL-MCNC: 9.5 MG/DL (ref 8.3–10.6)
CHLORIDE BLD-SCNC: 98 MMOL/L (ref 99–110)
CO2: 26 MMOL/L (ref 21–32)
CREAT SERPL-MCNC: 0.9 MG/DL (ref 0.8–1.3)
GFR SERPL CREATININE-BSD FRML MDRD: >60 ML/MIN/{1.73_M2}
GLUCOSE BLD-MCNC: 104 MG/DL (ref 70–99)
HCT VFR BLD CALC: 45.8 % (ref 40.5–52.5)
HEMOGLOBIN: 14.9 G/DL (ref 13.5–17.5)
INR BLD: 1.09 (ref 0.87–1.14)
MCH RBC QN AUTO: 29.6 PG (ref 26–34)
MCHC RBC AUTO-ENTMCNC: 32.5 G/DL (ref 31–36)
MCV RBC AUTO: 91 FL (ref 80–100)
PDW BLD-RTO: 15.2 % (ref 12.4–15.4)
PLATELET # BLD: 255 K/UL (ref 135–450)
PMV BLD AUTO: 7.7 FL (ref 5–10.5)
POTASSIUM SERPL-SCNC: 4.8 MMOL/L (ref 3.5–5.1)
PROTHROMBIN TIME: 14 SEC (ref 11.7–14.5)
RBC # BLD: 5.03 M/UL (ref 4.2–5.9)
SODIUM BLD-SCNC: 135 MMOL/L (ref 136–145)
TOTAL PROTEIN: 6.4 G/DL (ref 6.4–8.2)
WBC # BLD: 6.6 K/UL (ref 4–11)

## 2023-01-27 NOTE — TELEPHONE ENCOUNTER
Leanna Watters, patient's wife, called in to schedule the patient's surgery    Please contact Leanna Watters at [phone]

## 2023-01-30 ENCOUNTER — PREP FOR PROCEDURE (OUTPATIENT)
Dept: CARDIOLOGY CLINIC | Age: 83
End: 2023-01-30

## 2023-01-30 RX ORDER — CEFAZOLIN SODIUM 1 G/3ML
1000 INJECTION, POWDER, FOR SOLUTION INTRAMUSCULAR; INTRAVENOUS ONCE
Status: CANCELLED | OUTPATIENT
Start: 2023-01-30 | End: 2023-01-30

## 2023-01-30 NOTE — PRE-PROCEDURE INSTRUCTIONS
Called patient and spoke to patient's wife about procedure. Told to be here at 1230 for procedure at 1400. NPO after midnight, but can take morning medication with sips of water, patient stated they are not on blood thinners. To have a responsible adult be with patient take them home and stay with them afterwards, if they do not get admitted to [address] [place] [address]. And if available bring current list of medications. No other questions or concerns.

## 2023-01-31 ENCOUNTER — ANESTHESIA (OUTPATIENT)
Dept: CARDIAC CATH/INVASIVE PROCEDURES | Age: 83
End: 2023-01-31

## 2023-01-31 ENCOUNTER — PROCEDURE VISIT (OUTPATIENT)
Dept: CARDIOLOGY CLINIC | Age: 83
End: 2023-01-31

## 2023-01-31 ENCOUNTER — HOSPITAL ENCOUNTER (OUTPATIENT)
Dept: GENERAL RADIOLOGY | Age: 83
Discharge: HOME | End: 2023-01-31
Payer: MEDICARE

## 2023-01-31 ENCOUNTER — HOSPITAL ENCOUNTER (OUTPATIENT)
Dept: CARDIAC CATH/INVASIVE PROCEDURES | Age: 83
Discharge: HOME | End: 2023-02-02
Payer: MEDICARE

## 2023-01-31 ENCOUNTER — ANESTHESIA EVENT (OUTPATIENT)
Dept: CARDIAC CATH/INVASIVE PROCEDURES | Age: 83
End: 2023-01-31

## 2023-01-31 VITALS — WEIGHT: 168 LBS | HEIGHT: 63 IN | BODY MASS INDEX: 29.77 KG/M2 | TEMPERATURE: 98.1 F

## 2023-01-31 DIAGNOSIS — I44.30 AV BLOCK: ICD-10-CM

## 2023-01-31 DIAGNOSIS — I45.5 SINUS PAUSE: ICD-10-CM

## 2023-01-31 DIAGNOSIS — Z95.0 CARDIAC PACEMAKER IN SITU: Primary | ICD-10-CM

## 2023-01-31 PROCEDURE — 33208 INSRT HEART PM ATRIAL & VENT: CPT | Performed by: INTERNAL MEDICINE

## 2023-01-31 PROCEDURE — 3700000000 HC ANESTHESIA ATTENDED CARE

## 2023-01-31 PROCEDURE — C1898 LEAD, PMKR, OTHER THAN TRANS: HCPCS

## 2023-01-31 PROCEDURE — C1769 GUIDE WIRE: HCPCS

## 2023-01-31 PROCEDURE — 2580000003 HC RX 258: Performed by: NURSE ANESTHETIST, CERTIFIED REGISTERED

## 2023-01-31 PROCEDURE — 33208 INSRT HEART PM ATRIAL & VENT: CPT

## 2023-01-31 PROCEDURE — 6360000002 HC RX W HCPCS: Performed by: INTERNAL MEDICINE

## 2023-01-31 PROCEDURE — 2500000003 HC RX 250 WO HCPCS: Performed by: NURSE ANESTHETIST, CERTIFIED REGISTERED

## 2023-01-31 PROCEDURE — 2709999900 HC NON-CHARGEABLE SUPPLY

## 2023-01-31 PROCEDURE — 93005 ELECTROCARDIOGRAM TRACING: CPT | Performed by: INTERNAL MEDICINE

## 2023-01-31 PROCEDURE — 3700000001 HC ADD 15 MINUTES (ANESTHESIA)

## 2023-01-31 PROCEDURE — 71045 X-RAY EXAM CHEST 1 VIEW: CPT

## 2023-01-31 PROCEDURE — C1894 INTRO/SHEATH, NON-LASER: HCPCS

## 2023-01-31 PROCEDURE — 6360000002 HC RX W HCPCS

## 2023-01-31 PROCEDURE — 6360000002 HC RX W HCPCS: Performed by: NURSE ANESTHETIST, CERTIFIED REGISTERED

## 2023-01-31 PROCEDURE — C1785 PMKR, DUAL, RATE-RESP: HCPCS

## 2023-01-31 PROCEDURE — C1887 CATHETER, GUIDING: HCPCS

## 2023-01-31 PROCEDURE — 2500000003 HC RX 250 WO HCPCS

## 2023-01-31 RX ORDER — SODIUM CHLORIDE 9 MG/ML
INJECTION, SOLUTION INTRAVENOUS CONTINUOUS PRN
Status: DISCONTINUED | OUTPATIENT
Start: 2023-01-31 | End: 2023-01-31 | Stop reason: SDUPTHER

## 2023-01-31 RX ORDER — CEFAZOLIN SODIUM 1 G/3ML
1000 INJECTION, POWDER, FOR SOLUTION INTRAMUSCULAR; INTRAVENOUS ONCE
Status: COMPLETED | OUTPATIENT
Start: 2023-01-31 | End: 2023-01-31

## 2023-01-31 RX ORDER — SODIUM CHLORIDE 9 MG/ML
INJECTION, SOLUTION INTRAVENOUS PRN
Status: DISCONTINUED | OUTPATIENT
Start: 2023-01-31 | End: 2023-02-03 | Stop reason: HOSPADM

## 2023-01-31 RX ORDER — EPHEDRINE SULFATE 50 MG/ML
INJECTION INTRAVENOUS PRN
Status: DISCONTINUED | OUTPATIENT
Start: 2023-01-31 | End: 2023-01-31 | Stop reason: SDUPTHER

## 2023-01-31 RX ORDER — SODIUM CHLORIDE 0.9 % (FLUSH) 0.9 %
5-40 SYRINGE (ML) INJECTION PRN
Status: DISCONTINUED | OUTPATIENT
Start: 2023-01-31 | End: 2023-02-03 | Stop reason: HOSPADM

## 2023-01-31 RX ORDER — PROPOFOL 10 MG/ML
INJECTION, EMULSION INTRAVENOUS CONTINUOUS PRN
Status: DISCONTINUED | OUTPATIENT
Start: 2023-01-31 | End: 2023-01-31 | Stop reason: SDUPTHER

## 2023-01-31 RX ORDER — PROPOFOL 10 MG/ML
INJECTION, EMULSION INTRAVENOUS PRN
Status: DISCONTINUED | OUTPATIENT
Start: 2023-01-31 | End: 2023-01-31 | Stop reason: SDUPTHER

## 2023-01-31 RX ORDER — SODIUM CHLORIDE 0.9 % (FLUSH) 0.9 %
5-40 SYRINGE (ML) INJECTION EVERY 12 HOURS SCHEDULED
Status: DISCONTINUED | OUTPATIENT
Start: 2023-01-31 | End: 2023-02-03 | Stop reason: HOSPADM

## 2023-01-31 RX ORDER — FENTANYL CITRATE 50 UG/ML
INJECTION, SOLUTION INTRAMUSCULAR; INTRAVENOUS PRN
Status: DISCONTINUED | OUTPATIENT
Start: 2023-01-31 | End: 2023-01-31 | Stop reason: SDUPTHER

## 2023-01-31 RX ORDER — SODIUM CHLORIDE, SODIUM LACTATE, POTASSIUM CHLORIDE, CALCIUM CHLORIDE 600; 310; 30; 20 MG/100ML; MG/100ML; MG/100ML; MG/100ML
INJECTION, SOLUTION INTRAVENOUS CONTINUOUS PRN
Status: DISCONTINUED | OUTPATIENT
Start: 2023-01-31 | End: 2023-01-31 | Stop reason: SDUPTHER

## 2023-01-31 RX ADMIN — PHENYLEPHRINE HYDROCHLORIDE 100 MCG: 10 INJECTION, SOLUTION INTRAMUSCULAR; INTRAVENOUS; SUBCUTANEOUS at 13:54

## 2023-01-31 RX ADMIN — EPHEDRINE SULFATE 20 MG: 50 INJECTION INTRAVENOUS at 13:58

## 2023-01-31 RX ADMIN — PHENYLEPHRINE HYDROCHLORIDE 100 MCG: 10 INJECTION, SOLUTION INTRAMUSCULAR; INTRAVENOUS; SUBCUTANEOUS at 13:49

## 2023-01-31 RX ADMIN — FENTANYL CITRATE 100 MCG: 50 INJECTION, SOLUTION INTRAMUSCULAR; INTRAVENOUS at 14:37

## 2023-01-31 RX ADMIN — PROPOFOL 30 MG: 10 INJECTION, EMULSION INTRAVENOUS at 13:21

## 2023-01-31 RX ADMIN — CEFAZOLIN SODIUM 2000 MG: 1 POWDER, FOR SOLUTION INTRAMUSCULAR; INTRAVENOUS at 13:19

## 2023-01-31 RX ADMIN — SODIUM CHLORIDE: 9 INJECTION, SOLUTION INTRAVENOUS at 13:50

## 2023-01-31 RX ADMIN — SODIUM CHLORIDE, SODIUM LACTATE, POTASSIUM CHLORIDE, AND CALCIUM CHLORIDE: .6; .31; .03; .02 INJECTION, SOLUTION INTRAVENOUS at 13:03

## 2023-01-31 RX ADMIN — EPHEDRINE SULFATE 30 MG: 50 INJECTION INTRAVENOUS at 14:05

## 2023-01-31 RX ADMIN — PHENYLEPHRINE HYDROCHLORIDE 50 MCG: 10 INJECTION, SOLUTION INTRAMUSCULAR; INTRAVENOUS; SUBCUTANEOUS at 13:39

## 2023-01-31 RX ADMIN — PROPOFOL 150 MCG/KG/MIN: 10 INJECTION, EMULSION INTRAVENOUS at 13:21

## 2023-01-31 RX ADMIN — PHENYLEPHRINE HYDROCHLORIDE 150 MCG: 10 INJECTION, SOLUTION INTRAMUSCULAR; INTRAVENOUS; SUBCUTANEOUS at 14:05

## 2023-01-31 ASSESSMENT — LIFESTYLE VARIABLES: SMOKING_STATUS: 0

## 2023-01-31 NOTE — INTERVAL H&P NOTE
Update History & Physical    The patient's History and Physical of January 26, was reviewed with the patient and I examined the patient. There was no change. The surgical site was confirmed by the patient and me. Plan: The risks, benefits, expected outcome, and alternative to the recommended procedure have been discussed with the patient. Patient understands and wants to proceed with the procedure.      Electronically signed by Hussein Arguello MD on 1/31/2023 at 2:41 PM

## 2023-01-31 NOTE — ANESTHESIA POSTPROCEDURE EVALUATION
Department of Anesthesiology  Postprocedure Note    Patient: Jaydon Villagomez  MRN: [id number]  YOB: 1940  Date of evaluation: 1/31/2023      Procedure Summary     Date: 01/31/23 Room / Location: [facility] Cath Lab    Anesthesia Start: [address] Anesthesia Stop: 9617    Procedure: [facility] PACEMAKER W ANES Diagnosis:     Scheduled Providers: Ranjeet Henao MD Responsible Provider: Sally Evans MD    Anesthesia Type: MAC ASA Status: 3          Anesthesia Type: No value filed.     Glo Phase I:      Glo Phase II:        Anesthesia Post Evaluation    Patient location during evaluation: PACU  Level of consciousness: awake  Complications: no  Multimodal analgesia pain management approach

## 2023-01-31 NOTE — PROCEDURES
[facility]     Electrophysiology Procedure Note       Date of Procedure: 1/31/2023  Patient's Name: Dallas Allen  YOB: 1940   Medical Record Number: [id number]  Procedure Performed by: Robbin Watt MD    Procedures performed:  Insertion of MRI compatible right ventricular pacing lead under fluoroscopy. Insertion of MRI compatible right atrial lead under fluoroscopy  Insertion of a MRI compatible dual chamber Pacemaker. Electronic analysis of lead and device. Anesthesia: Local and Monitored Anesthesia Care  Estimated blood loss less than 20 cc    Indication of the procedure:       Dallas Allen is a 78 y.o. male with high-grade AV block, nonsustained ventricular tachycardia, here for dual-chamber pacemaker implantation. Details of procedure: The patient was brought to the electrophysiology laboratory in stable condition. The patient was in a fasting and non-sedated state. The risks, benefits and alternatives of the procedure were discussed with the patient. The risks including, but not limited to, the risks of vascular injury, bleeding, infection, device malfunction, lead dislodgement, radiation exposure, injury to cardiac and surrounding structures (including pneumothorax), stroke, myocardial infarction and death were discussed in detail. The patient opted to proceed with the device implantation. Written informed consent was signed and placed in the chart. Prophylactic antibiotic using Ancef 2 g IV was given. The patient was prepped and draped in sterile fashion. A timeout protocol was completed to identify the patient and the procedure being performed. IV sedation was provided with IV Versed and IV Fentanyl. The patient was monitored continuously with ECG, pulse oximetry, blood pressure monitoring, and direct observation.  An incision was made in the left upper pectoral area in a transverse line roughly 1 cm from the clavicle after administration of lidocaine/bupivicaine combination. Using electrocautery and blunt dissection, a pocket was created. Central venous access into the left extrathoracic subclavian vein was obtained using the modified Seldinger technique. After central venous access was obtained, a sheath was placed in the axillary vein. A right ventricular lead was advanced into the RV septal position under fluoroscopic guidance and using a series of curved and straight stylets. The lead was actively fixated. After confirming appropriate function and no diaphragmatic stimulation at maximum output, the sheath was split and removed. The lead was secured to the underlying tissue using suture material.      A new sheath was advanced over a second previously placed wire into the vein. The atrial lead was advanced to the right atrial appendage and actively fixated under fluoroscopic guidance. After confirming appropriate function and no diaphragmatic stimulation at maximum output, the sheath was split and removed. The lead was secured to the underlying tissue using suture. The leads were then connected to the new pulse generator which was then placed into the pocket. Antibiotic solution along with saline flush was used to irrigate the pocket. The pocket was then closed using a 2-0 and 3-0 Vicryl subcutaneous layer and a subcuticular layer using 4-0 Vicryl. The skin was covered with Steri-Strips and dressing. All sponge and needle counts were reported as correct at the end of the procedure. The patient tolerated the procedure well and there were no complications. Patient was transported to the holding area in stable condition. Device and Leads information:          Device was programmed to MVP with lower rate of 60 and upper tracking rate of 130.      Impression:    Pre- and post-procedural diagnoses of high-grade AV block with successful implantation of a Medtronic 2-chamber PPM.     Plan:     The patient will be observed and have usual post-implant care, including chest x-ray, and interrogation of the device. From an EP perspective, if the interrogation and CXR  are showing appropriate functioning, parameters and placement, the patient may be discharged from the hospital. Follow-up will be a 1-week wound check with our nurse in the [facility] and a 1-month follow-up with EP nurse practitioner    Thank you for allowing us to participate in the care of your patient. If you have any questions, please do not hesitate to contact me.     Ned Shook MD   Cardiac Electrophysiology  [facility]

## 2023-01-31 NOTE — DISCHARGE INSTRUCTIONS
DRIVING:  No driving x 7 days until seen for your one week follow-up appointment. DIET  You may resume your diet    ACTIVITY  You may walk as tolerated. No lifting the arm above the head for 30 days. No reaching back to grab for 30 days. No leaning forward to pull for 30 days. Do not lift anything heavier than 5lbs with the affected arm. Please wear your sling and swathe x 24 hours, after the 24 hours it may be removed during the day as long as you remember your arm restrictions. Please wear the sling and swathe at bedtime for the first week. INCISION CARE   Please leave the dressing on till your 1 week follow-up. Please keep your dressing dry. No bathtubs or shower x 7 days . Sponge bath only. Avoid placing any cream, lotions or ointments to the site. No swimming or hot tubs until the site is completely healed. You may use an ice pack to the site for discomfort. Please limit to 20 minutes on and 20 minutes off. Please monitor for signs and symptoms of infection and contact the office immediately for any of the following :    Redness, swelling or warmth at the incision site  Fever greater than 100.5  Yellow drainage at the site  Pain    MEDICATIONS  Please continue all medications prescribed unless instructed otherwise by your healthcare provider. FOLLOW-UP  Please keep your 1 week follow-up appointment in the office. Thank you for trusting us with your care, if you have any questions or concerns please do not hesitate to contact our office @ [phone].

## 2023-01-31 NOTE — ANESTHESIA PRE PROCEDURE
Department of Anesthesiology  Preprocedure Note       Name:  Joao Blackwell   Age:  78 y.o.  :  1940                                          MRN:  [id number]         Date:  2023      Surgeon: * No surgeons listed *    Procedure: * No procedures listed *    Medications prior to admission:   Prior to Admission medications    Medication Sig Start Date End Date Taking? Authorizing Provider   dutasteride (AVODART) 0.5 MG capsule Take 1 capsule by mouth daily 10/10/22   Veronica Ng MD   rosuvastatin (CRESTOR) 10 MG tablet TAKE 1 TABLET BY MOUTH EVERY DAY 22   Veronica Ng MD   tamsulosin [facility]) 0.4 MG capsule Take 2 capsules by mouth daily 22   Veronica Ng MD   aspirin 81 MG tablet Take 81 mg by mouth daily    Historical Provider, MD   Flaxseed, Linseed, 1000 MG CAPS Take 1 capsule by mouth 2 times daily. Historical Provider, MD   multivitamin [facility]) per tablet Take 1 tablet by mouth daily. Historical Provider, MD   Psyllium (METAMUCIL PO) Take 2 Units by mouth daily. 2 TSP. 5/15/10   Historical Provider, MD       Current medications:    Current Outpatient Medications   Medication Sig Dispense Refill    dutasteride (AVODART) 0.5 MG capsule Take 1 capsule by mouth daily 90 capsule 1    rosuvastatin (CRESTOR) 10 MG tablet TAKE 1 TABLET BY MOUTH EVERY DAY 90 tablet 3    tamsulosin (FLOMAX) 0.4 MG capsule Take 2 capsules by mouth daily 180 capsule 3    aspirin 81 MG tablet Take 81 mg by mouth daily      Flaxsekathy Linseed, 1000 MG CAPS Take 1 capsule by mouth 2 times daily.  multivitamin (THERAGRAN) per tablet Take 1 tablet by mouth daily.  Psyllium (METAMUCIL PO) Take 2 Units by mouth daily. 2 TSP. No current facility-administered medications for this visit.      Facility-Administered Medications Ordered in Other Visits   Medication Dose Route Frequency Provider Last Rate Last Admin    ceFAZolin (ANCEF) injection 1,000 mg  1,000 mg IntraVENous Once Rashaad Weiss MD        iohexol (OMNIPAQUE 180) injection 1.94 mL  350 mg IntraVENous ONCE PRN Rashaad Weiss MD           Allergies: Allergies   Allergen Reactions    Demerol Nausea And Vomiting    Oxycodone Hcl Nausea And Vomiting    Meperidine Nausea And Vomiting       Problem List:    Patient Active Problem List   Diagnosis Code    Osteoarthrosis involving lower leg [id number]    Actinic keratosis L57.0    Hearing loss H91.90    BPH with obstruction/lower urinary tract symptoms N40.1, N13.8    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    TIA (transient ischemic attack) G45.9    Degenerative joint disease of knee M17.9    Joint prosthesis infection or inflammation ([address] T84.50XA    Mitral valve disease I05.9    Aortic valve disease I35.9    H/O endocarditis Z86.79    Infection and inflammatory reaction due to internal joint prosthesis ([address] T84.50XA    History of total knee replacement Z96.659    Squamous cell cancer of scalp and skin of neck C44.42    Mixed hyperlipidemia E78.2    Allergic rhinitis due to pollen J30.1    Mild cognitive impairment G31.84    SHAINA on CPAP G47.33, Z99.89    Prediabetes R73.03    Left carpal tunnel syndrome G56.02    Sinus pause I45.5       Past Medical History:        Diagnosis Date    Aortic valve disorder     Arthritis     Blood transfusion     BPH (benign prostatic hypertrophy)     Cancer (HCC)     SKIN.    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    Diverticulosis of colon     Diverticulosis of large intestine 05/15/2010    Essential hypertension 01/17/2013    History of blood transfusion     History of GI bleed 04/2012    Hydronephrosis, left 07/02/2016    Hyperlipidemia     Left carpal tunnel syndrome 01/26/2022    Mitral valve disorder     MSSA (methicillin susceptible Staphylococcus aureus) septicemia ([address] 03/2012    Osteoarthritis     neck, back, knees    Partial small bowel obstruction ([address]     Sleep apnea     Staph aureus infection 03/10/2012    blood    TIA (transient ischemic attack) 05/2012       Past Surgical History:        Procedure Laterality Date    AORTIC VALVE SURGERY      REPAIR    CARDIAC SURGERY  06/2012    angiogram-no blockage    COLONOSCOPY      COLONOSCOPY N/A 08/31/2022    COLONOSCOPY WITH BIOPSY performed by Hiral Ruiz MD at [address] [place] [address] N/A 08/31/2022    COLONOSCOPY POLYPECTOMY SNARE/COLD BIOPSY performed by Hiral Ruiz MD at [facility] COLONOSCOPY N/A 08/31/2022    COLONOSCOPY SUBMUCOSAL/ SPOT  INJECTION performed by Hiral Ruiz MD at [facility].  JOINT REPLACEMENT      RIGHT KNEE TOTAL 11/05, left knee 2006    KNEE PROSTHESIS REMOVAL Left     KNEE SURGERY  04/2012    to remove blood clot on right knee    KNEE SURGERY  03/2012    for MSSA infection bilat. knees    MITRAL VALVE SURGERY      REPAIR    OTHER SURGICAL HISTORY  07/03/2016    CYSTOSCOPY, LEFT URETEROSCOPY STONE MANIPULATION WITH LASER,    TOTAL KNEE ARTHROPLASTY      bilat    UPPER GASTROINTESTINAL ENDOSCOPY N/A 08/31/2022    EGD BIOPSY performed by Hiral Ruiz MD at [address] [place] [address] History:    Social History     Tobacco Use    Smoking status: Never    Smokeless tobacco: Never   Substance Use Topics    Alcohol use: Yes     Comment: DAILY 1 martini                                Counseling given: Not Answered      Vital Signs (Current): There were no vitals filed for this visit.                                            BP Readings from Last 3 Encounters:   01/26/23 (!) 140/90   01/10/23 124/76   01/06/23 (!) 161/91       NPO Status:                                                                                 BMI:   Wt Readings from Last 3 Encounters:   01/31/23 168 lb (76.2 kg)   01/26/23 172 lb (78 kg)   01/10/23 171 lb 9.6 oz (77.8 kg)     There is no height or weight on file to calculate BMI.    CBC:   Lab Results   Component Value Date/Time    WBC 6.6 01/27/2023 08:48 AM    RBC 5.03 01/27/2023 08:48 AM    HGB 14.9 01/27/2023 08:48 AM    HCT 45.8 01/27/2023 08:48 AM    MCV 91.0 01/27/2023 08:48 AM    RDW 15.2 01/27/2023 08:48 AM     01/27/2023 08:48 AM       CMP:   Lab Results   Component Value Date/Time     01/27/2023 08:48 AM    K 4.8 01/27/2023 08:48 AM    K 4.6 06/12/2019 05:53 PM    CL 98 01/27/2023 08:48 AM    CO2 26 01/27/2023 08:48 AM    BUN 15 01/27/2023 08:48 AM    CREATININE 0.9 01/27/2023 08:48 AM    GFRAA >60 09/30/2022 11:09 AM    GFRAA >60 01/11/2013 11:26 AM    AGRATIO 1.6 01/27/2023 08:48 AM    LABGLOM >60 01/27/2023 08:48 AM    GLUCOSE 104 01/27/2023 08:48 AM    GLUCOSE 97 12/13/2011 08:14 AM    PROT 6.4 01/27/2023 08:48 AM    PROT 7.9 01/11/2013 11:26 AM    CALCIUM 9.5 01/27/2023 08:48 AM    BILITOT 0.8 01/27/2023 08:48 AM    ALKPHOS 99 01/27/2023 08:48 AM    AST 27 01/27/2023 08:48 AM    ALT 19 01/27/2023 08:48 AM       POC Tests: No results for input(s): POCGLU, POCNA, POCK, POCCL, POCBUN, POCHEMO, POCHCT in the last 72 hours.     Coags:   Lab Results   Component Value Date/Time    PROTIME 14.0 01/27/2023 08:48 AM    INR 1.09 01/27/2023 08:48 AM    APTT 30.8 07/13/2012 01:20 PM       HCG (If Applicable): No results found for: PREGTESTUR, PREGSERUM, HCG, HCGQUANT     ABGs: No results found for: PHART, PO2ART, ZEL7IWX, QRT7TBW, BEART, F5BQKEAT     Type & Screen (If Applicable):  Lab Results   Component Value Date    LABABO A 02/21/2013    [address] Negative 07/13/2012       Drug/Infectious Status (If Applicable):  No results found for: HIV, HEPCAB    COVID-19 Screening (If Applicable): No results found for: COVID19        Anesthesia Evaluation  Patient summary reviewed and Nursing notes reviewed no history of anesthetic complications:   Airway: Mallampati: III  TM distance: >3 FB   Neck ROM: full  Mouth opening: > = 3 FB   Dental: normal exam         Pulmonary:normal exam    (+) sleep apnea:      (-) not a current smoker (never)                           Cardiovascular:  Exercise tolerance: good (>4 METS),   (+) hypertension:, valvular problems/murmurs (AVR/ mitral repair 2012): AI, dysrhythmias (2nd degree av block for pacemaker  had pauses  on holter  ):,     (-) past MI,  angina, orthopnea and PND    NYHA Classification: II  ECG reviewed  Rhythm: regular  Rate: normal  Echocardiogram reviewed         Beta Blocker:  Not on Beta Blocker      ROS comment: Results for orders placed or performed during the hospital encounter of 01/09/19  -ECHO Complete 2D W Doppler W Color:        Result                      Value             Ref Range           Left Ventricular Eject*     50                                    LVEF MODALITY               ECHO                                  Neuro/Psych:   (+) neuromuscular disease:, TIA,             GI/Hepatic/Renal:        (-) GERD      ROS comment: Precancerous lesion colon  . Endo/Other: Negative Endo/Other ROS                    Abdominal:             Vascular: Other Findings:             Anesthesia Plan      MAC     ASA 3       Induction: intravenous. MIPS: Prophylactic antiemetics administered. Anesthetic plan and risks discussed with patient and spouse. Plan discussed with CRNA.     Attending anesthesiologist reviewed and agrees with Preprocedure content                Lieutenant Abbi DO   1/31/2023

## 2023-02-01 DIAGNOSIS — D12.8 ADENOMATOUS RECTAL POLYP: Primary | ICD-10-CM

## 2023-02-01 PROBLEM — Z95.0 CARDIAC PACEMAKER IN SITU: Status: ACTIVE | Noted: 2023-02-01

## 2023-02-01 LAB
EKG ATRIAL RATE: 73 BPM
EKG DIAGNOSIS: NORMAL
EKG P AXIS: 32 DEGREES
EKG P-R INTERVAL: 222 MS
EKG Q-T INTERVAL: 398 MS
EKG QRS DURATION: 88 MS
EKG QTC CALCULATION (BAZETT): 438 MS
EKG R AXIS: 110 DEGREES
EKG T AXIS: 48 DEGREES
EKG VENTRICULAR RATE: 73 BPM

## 2023-02-01 PROCEDURE — 93010 ELECTROCARDIOGRAM REPORT: CPT | Performed by: INTERNAL MEDICINE

## 2023-02-01 RX ORDER — ONDANSETRON 4 MG/1
4 TABLET, ORALLY DISINTEGRATING ORAL 3 TIMES DAILY PRN
Qty: 3 TABLET | Refills: 0 | Status: SHIPPED | OUTPATIENT
Start: 2023-02-01

## 2023-02-01 RX ORDER — NEOMYCIN SULFATE 500 MG/1
TABLET ORAL
Qty: 6 TABLET | Refills: 0 | Status: SHIPPED | OUTPATIENT
Start: 2023-02-01

## 2023-02-01 RX ORDER — METRONIDAZOLE 500 MG/1
TABLET ORAL
Qty: 3 TABLET | Refills: 0 | Status: SHIPPED | OUTPATIENT
Start: 2023-02-01

## 2023-02-01 NOTE — TELEPHONE ENCOUNTER
Patient has been scheduled for:    Procedure: Robo LAR  Date: 3/13/23  Time: 7:15AM  Arrival: 5:30AM  [facility]  [facility] aspirin 7 days   Prep? Pre-op? pcp    Post-op Appt? 3/28/23 at 9:00AM    Patient advised they will be admitted    Orders routed to surgery scheduling. Instructions have been mailed/emailed to:    [email or web address]    Prep meds sent to  for approval.

## 2023-02-07 ENCOUNTER — NURSE ONLY (OUTPATIENT)
Dept: CARDIOLOGY CLINIC | Age: 83
End: 2023-02-07
Payer: MEDICARE

## 2023-02-07 DIAGNOSIS — I45.5 SINUS PAUSE: ICD-10-CM

## 2023-02-07 DIAGNOSIS — I44.30 AV HEART BLOCK: ICD-10-CM

## 2023-02-07 DIAGNOSIS — Z95.0 CARDIAC PACEMAKER IN SITU: Primary | ICD-10-CM

## 2023-02-07 PROCEDURE — 93280 PM DEVICE PROGR EVAL DUAL: CPT | Performed by: INTERNAL MEDICINE

## 2023-02-07 NOTE — PROGRESS NOTES
Patient comes in for a 1 week wound check and programming evaluation on their Medtronic DC PPM.     Relay monitored paired. Discussed remote appts, frequencies and communications. AVS, Relay and MARCY brochure provided today. Dressing removed from left chest device site. Incision is well approximated, CDI, SS remain. Bruising noted. Reviewed post op wound care and restrictions. Pt informed to call office if he develops any fever, chills, increased swelling, redness or drainage from site. Pt voiced an understanding. All sensing and pacing parameters appear unchanged since implant. Competitive Atrial pacing noted on presenting. MVP On. Battery life 12.1 years  AP 48.8%.  35.5%. 74 AT/AF episodes noted with available EGMs illustrating what appears as FFRWOS, PACs and/or Competitive Atrial Pacing. Please see interrogation for more detail. Patient will follow up in 1 month in office. We will continue to monitor remotely.

## 2023-02-17 ENCOUNTER — OFFICE VISIT (OUTPATIENT)
Dept: FAMILY MEDICINE CLINIC | Age: 83
End: 2023-02-17
Payer: MEDICARE

## 2023-02-17 VITALS
SYSTOLIC BLOOD PRESSURE: 130 MMHG | HEIGHT: 63 IN | HEART RATE: 56 BPM | DIASTOLIC BLOOD PRESSURE: 80 MMHG | BODY MASS INDEX: 30.33 KG/M2 | WEIGHT: 171.2 LBS

## 2023-02-17 DIAGNOSIS — E78.2 MIXED HYPERLIPIDEMIA: ICD-10-CM

## 2023-02-17 DIAGNOSIS — G47.33 OSA ON CPAP: ICD-10-CM

## 2023-02-17 DIAGNOSIS — R73.03 PREDIABETES: ICD-10-CM

## 2023-02-17 DIAGNOSIS — Z01.818 PREOP EXAMINATION: Primary | ICD-10-CM

## 2023-02-17 DIAGNOSIS — Z95.0 CARDIAC PACEMAKER IN SITU: ICD-10-CM

## 2023-02-17 DIAGNOSIS — Z01.818 PREOP EXAMINATION: ICD-10-CM

## 2023-02-17 DIAGNOSIS — Z99.89 OSA ON CPAP: ICD-10-CM

## 2023-02-17 LAB
A/G RATIO: 1.5 (ref 1.1–2.2)
ABO/RH: NORMAL
ALBUMIN SERPL-MCNC: 4.1 G/DL (ref 3.4–5)
ALP BLD-CCNC: 103 U/L (ref 40–129)
ALT SERPL-CCNC: 25 U/L (ref 10–40)
ANION GAP SERPL CALCULATED.3IONS-SCNC: 12 MMOL/L (ref 3–16)
ANTIBODY SCREEN: NORMAL
AST SERPL-CCNC: 28 U/L (ref 15–37)
BILIRUB SERPL-MCNC: 1.2 MG/DL (ref 0–1)
BUN BLDV-MCNC: 14 MG/DL (ref 7–20)
CALCIUM SERPL-MCNC: 9.6 MG/DL (ref 8.3–10.6)
CHLORIDE BLD-SCNC: 95 MMOL/L (ref 99–110)
CHOLESTEROL, TOTAL: 158 MG/DL (ref 0–199)
CO2: 26 MMOL/L (ref 21–32)
CREAT SERPL-MCNC: 0.9 MG/DL (ref 0.8–1.3)
GFR SERPL CREATININE-BSD FRML MDRD: >60 ML/MIN/{1.73_M2}
GLUCOSE BLD-MCNC: 92 MG/DL (ref 70–99)
HCT VFR BLD CALC: 46.1 % (ref 40.5–52.5)
HDLC SERPL-MCNC: 55 MG/DL (ref 40–60)
HEMOGLOBIN: 15.7 G/DL (ref 13.5–17.5)
INR BLD: 1.05 (ref 0.87–1.14)
LDL CHOLESTEROL CALCULATED: 87 MG/DL
MCH RBC QN AUTO: 30.2 PG (ref 26–34)
MCHC RBC AUTO-ENTMCNC: 34 G/DL (ref 31–36)
MCV RBC AUTO: 89.1 FL (ref 80–100)
PDW BLD-RTO: 15.1 % (ref 12.4–15.4)
PLATELET # BLD: 286 K/UL (ref 135–450)
PMV BLD AUTO: 7.9 FL (ref 5–10.5)
POTASSIUM SERPL-SCNC: 4.9 MMOL/L (ref 3.5–5.1)
PROTHROMBIN TIME: 13.6 SEC (ref 11.7–14.5)
RBC # BLD: 5.18 M/UL (ref 4.2–5.9)
SODIUM BLD-SCNC: 133 MMOL/L (ref 136–145)
TOTAL PROTEIN: 6.8 G/DL (ref 6.4–8.2)
TRIGL SERPL-MCNC: 81 MG/DL (ref 0–150)
VLDLC SERPL CALC-MCNC: 16 MG/DL
WBC # BLD: 7.5 K/UL (ref 4–11)

## 2023-02-17 PROCEDURE — 1124F ACP DISCUSS-NO DSCNMKR DOCD: CPT | Performed by: FAMILY MEDICINE

## 2023-02-17 PROCEDURE — 99213 OFFICE O/P EST LOW 20 MIN: CPT | Performed by: FAMILY MEDICINE

## 2023-02-17 SDOH — ECONOMIC STABILITY: FOOD INSECURITY: WITHIN THE PAST 12 MONTHS, THE FOOD YOU BOUGHT JUST DIDN'T LAST AND YOU DIDN'T HAVE MONEY TO GET MORE.: NEVER TRUE

## 2023-02-17 SDOH — ECONOMIC STABILITY: FOOD INSECURITY: WITHIN THE PAST 12 MONTHS, YOU WORRIED THAT YOUR FOOD WOULD RUN OUT BEFORE YOU GOT MONEY TO BUY MORE.: NEVER TRUE

## 2023-02-17 SDOH — ECONOMIC STABILITY: HOUSING INSECURITY
IN THE LAST 12 MONTHS, WAS THERE A TIME WHEN YOU DID NOT HAVE A STEADY PLACE TO SLEEP OR SLEPT IN A SHELTER (INCLUDING NOW)?: NO

## 2023-02-17 SDOH — ECONOMIC STABILITY: INCOME INSECURITY: HOW HARD IS IT FOR YOU TO PAY FOR THE VERY BASICS LIKE FOOD, HOUSING, MEDICAL CARE, AND HEATING?: NOT HARD AT ALL

## 2023-02-17 ASSESSMENT — PATIENT HEALTH QUESTIONNAIRE - PHQ9
SUM OF ALL RESPONSES TO PHQ QUESTIONS 1-9: 0
1. LITTLE INTEREST OR PLEASURE IN DOING THINGS: 0
SUM OF ALL RESPONSES TO PHQ9 QUESTIONS 1 & 2: 0
SUM OF ALL RESPONSES TO PHQ QUESTIONS 1-9: 0
2. FEELING DOWN, DEPRESSED OR HOPELESS: 0

## 2023-02-17 ASSESSMENT — ENCOUNTER SYMPTOMS: RESPIRATORY NEGATIVE: 1

## 2023-02-17 NOTE — PROGRESS NOTES
Carlos Manuel Arguello (:  1940) is a 78 y.o. male,Established patient, here for evaluation of the following chief complaint(s):  Pre-op Exam (ROBOTIC LOW ANTERIOR RESECTION;3/13/23; Raysa Terrell MD;HERNÁN )         ASSESSMENT/PLAN:  Donna Damon was seen today for pre-op exam.    Diagnoses and all orders for this visit:    Preop examination  -     CBC; Future  -     TYPE AND SCREEN; Future  -     Protime-INR; Future  Medically patient is at acceptable risk to undergo planned surgery. SHAINA on CPAP  Compliant with on cpap  Cardiac pacemaker in situ  Stable, following with cardiology  Prediabetes  -     Hemoglobin A1C; Future  Stable with diet  Mixed hyperlipidemia  -     Comprehensive Metabolic Panel; Future  -     Lipid Panel; Future   Stable on crestor    No follow-ups on file. Subjective   SUBJECTIVE/OBJECTIVE:  VADIM  Pt is a of 78 y.o. male comes in today with   Chief Complaint   Patient presents with    Pre-op Exam     ROBOTIC LOW ANTERIOR RESECTION;3/13/23; Raysa Terrell MD;HERNÁN      S/p PPM placement for VT, AT, and high grade av block that was seen on heart monitor. Cleared now for surgery from cardiac perspective. Did not have any symptoms nor does he now. Surgery was rescheduled to 3/13. No personal or family history of adverse reaction to anesthesia or bleeding tendency. Compliant with cpap.     Vitals:    23 0813   BP: 130/80   Site: Right Upper Arm   Position: Sitting   Cuff Size: Medium Adult   Pulse: 56   Weight: 171 lb 3.2 oz (77.7 kg)   Height: 5' 3\" (1.6 m)     Allergies   Allergen Reactions    Demerol Nausea And Vomiting    Oxycodone Hcl Nausea And Vomiting    Meperidine Nausea And Vomiting       Current Outpatient Medications on File Prior to Visit   Medication Sig Dispense Refill    ondansetron (ZOFRAN-ODT) 4 MG disintegrating tablet Place 1 tablet under the tongue 3 times daily as needed for Nausea or Vomiting 3 tablet 0    metroNIDAZOLE (FLAGYL) 500 MG tablet Take one tablet by mouth 3 times on the day prior to surgery. Take one tablet at 1pm, 2pm and 9pm. 3 tablet 0    neomycin (MYCIFRADIN) 500 MG tablet Take two tablets 3 times the day before surgery. Take two tablets at 1pm, two tablets at 2pm and two tablets at 9pm. 6 tablet 0    dutasteride (AVODART) 0.5 MG capsule Take 1 capsule by mouth daily 90 capsule 1    rosuvastatin (CRESTOR) 10 MG tablet TAKE 1 TABLET BY MOUTH EVERY DAY 90 tablet 3    tamsulosin (FLOMAX) 0.4 MG capsule Take 2 capsules by mouth daily 180 capsule 3    aspirin 81 MG tablet Take 81 mg by mouth daily      Flaxseed, Linseed, 1000 MG CAPS Take 1 capsule by mouth 2 times daily. multivitamin (THERAGRAN) per tablet Take 1 tablet by mouth daily. Psyllium (METAMUCIL PO) Take 2 Units by mouth daily. 2 TSP. No current facility-administered medications on file prior to visit. Past Medical History:   Diagnosis Date    Aortic valve disorder     Arthritis     Blood transfusion     BPH (benign prostatic hypertrophy)     Cancer (HCC)     SKIN.     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    Diverticulosis of colon     Diverticulosis of large intestine 05/15/2010    Essential hypertension 01/17/2013    History of blood transfusion     History of GI bleed 04/2012    Hydronephrosis, left 07/02/2016    Hyperlipidemia     Left carpal tunnel syndrome 01/26/2022    Mitral valve disorder     MSSA (methicillin susceptible Staphylococcus aureus) septicemia ([facility] [address] 03/2012    Osteoarthritis     neck, back, knees    Partial small bowel obstruction (HCC)     Sleep apnea     Staph aureus infection 03/10/2012    blood    TIA (transient ischemic attack) 05/2012       Past Surgical History:   Procedure Laterality Date    AORTIC VALVE SURGERY      REPAIR    CARDIAC SURGERY  06/2012    angiogram-no blockage    COLONOSCOPY      COLONOSCOPY N/A 08/31/2022    COLONOSCOPY WITH BIOPSY performed by Thien Martin MD at [place] [address] N/A 08/31/2022 COLONOSCOPY POLYPECTOMY SNARE/COLD BIOPSY performed by Mary Jo Starks MD at [facility] 103 N/A 08/31/2022    COLONOSCOPY SUBMUCOSAL/ SPOT  INJECTION performed by Mary Jo Starks MD at [facility]. JOINT REPLACEMENT      RIGHT KNEE TOTAL 11/05, left knee 2006    KNEE PROSTHESIS REMOVAL Left     KNEE SURGERY  04/2012    to remove blood clot on right knee    KNEE SURGERY  03/2012    for MSSA infection bilat. knees    MITRAL VALVE SURGERY      REPAIR    OTHER SURGICAL HISTORY  07/03/2016    CYSTOSCOPY, LEFT URETEROSCOPY STONE MANIPULATION WITH LASER,    TOTAL KNEE ARTHROPLASTY      bilat    UPPER GASTROINTESTINAL ENDOSCOPY N/A 08/31/2022    EGD BIOPSY performed by Mary Jo Starks MD at [address] [facility]     Socioeconomic History    Marital status:      Spouse name: None    Number of children: None    Years of education: None    Highest education level: None   Tobacco Use    Smoking status: Never    Smokeless tobacco: Never   Substance and Sexual Activity    Alcohol use: Yes     Comment: DAILY 1 martini    Drug use: No     Social Determinants of Health     Financial Resource Strain: Low Risk     Difficulty of Paying Living Expenses: Not hard at all   Food Insecurity: No Food Insecurity    Worried About Running Out of Food in the Last Year: Never true    Ran Out of Food in the Last Year: Never true   Transportation Needs: Unknown    Lack of Transportation (Non-Medical):  No   Physical Activity: Insufficiently Active    Days of Exercise per Week: 4 days    Minutes of Exercise per Session: 30 min   Housing Stability: Unknown    Unstable Housing in the Last Year: No       Social History     Substance and Sexual Activity   Drug Use No       Family History   Problem Relation Age of Onset    Stroke Mother     Stroke Father     Heart Disease Father     Cancer Brother         skin      Review of Systems Constitutional: Negative. Respiratory: Negative. Cardiovascular: Negative. Hematological:  Does not bruise/bleed easily. Objective   Physical Exam  Constitutional:       Appearance: Normal appearance. Cardiovascular:      Rate and Rhythm: Normal rate and regular rhythm. Heart sounds: No murmur heard. Pulmonary:      Effort: Pulmonary effort is normal.      Breath sounds: Normal breath sounds. Musculoskeletal:      Right lower leg: No edema. Left lower leg: No edema. Lymphadenopathy:      Cervical: No cervical adenopathy. Skin:     General: Skin is warm and dry. Neurological:      General: No focal deficit present. Mental Status: He is alert. Psychiatric:         Mood and Affect: Mood normal.            An electronic signature was used to authenticate this note.     --Carina Lee MD

## 2023-02-18 LAB
ESTIMATED AVERAGE GLUCOSE: 122.6 MG/DL
HBA1C MFR BLD: 5.9 %

## 2023-03-02 NOTE — PROGRESS NOTES
[facility]   Electrophysiology  Office Visit  Date: 3/6/2023    Chief Complaint   Patient presents with    Bradycardia    Tachycardia     Cardiac HX: Marcelino Carrion is a 78 y.o. man with a h/o HTN, HLD, TIA, colon CA, s/p resection), [facility] (6/2012, Dr. Ted Farah) showed normal cors, s/p AV repair, MV repair (2012), follows with Dr. Ted Farah at [facility] monitor (12/2022) showed 278 episodes of high degree AV block, had seen Dr. Joey Alvarado and a PPM was recommended however patient wanted a second opinion, s/p dual ch MDT PPM (1/31/23, Dr. Refugio Reeves). Interval History/HPI: Patient is here for follow-up for high degree AV block and PPM management. Patient had been seen in January 2023 when he was admitted for colon surgery due to 1 CA. He had an echo in January 2022 that showed an EF of 50% along with a concern for AF due to an irregular rhythm that was noted during the exam.  He wore a ZIO monitor in December 2022 that showed 278 episodes of high degree AV block. He was seen by Dr. Joey Alvarado to discuss a pacemaker however the patient wanted a second opinion and his surgery was canceled. Patient wore a repeat CAM in January 2023 that showed episodes of Mobitz 2 AV block. No atrial fibrillation was seen on either monitor. Patient was seen by Dr. Refugio Reeves on 1/26/2023 as a new patient to discuss both monitors. He was noted to have NSVT, AT and high degree AV block on his monitors. His colon surgery had been delayed secondary to the monitor findings. He then underwent implantation of a dual-chamber MDT PPM on 1/31/2023. Today he presents in AS,  rhythm. Review of his device today shows 45.6% AP, 43.5% . He had no arrhythmias. Does appear to have intermittent CHB. His L chest incision has healed well. He denies dizziness, lightheadedness or syncopal events. He denies any chest pain, shortness of breath, PND, orthopnea or lower extremity edema. He is due to have his colon surgery on Monday.       Home medications:   Current Outpatient Medications on File Prior to Visit   Medication Sig Dispense Refill    dutasteride (AVODART) 0.5 MG capsule Take 1 capsule by mouth daily 90 capsule 1    rosuvastatin (CRESTOR) 10 MG tablet TAKE 1 TABLET BY MOUTH EVERY DAY 90 tablet 3    tamsulosin (FLOMAX) 0.4 MG capsule Take 2 capsules by mouth daily 180 capsule 3    Flaxseed, Linseed, 1000 MG CAPS Take 1 capsule by mouth 2 times daily. multivitamin (THERAGRAN) per tablet Take 1 tablet by mouth daily. Psyllium (METAMUCIL PO) Take 2 Units by mouth daily. 2 TSP. ondansetron (ZOFRAN-ODT) 4 MG disintegrating tablet Place 1 tablet under the tongue 3 times daily as needed for Nausea or Vomiting (Patient not taking: Reported on 3/6/2023) 3 tablet 0    metroNIDAZOLE (FLAGYL) 500 MG tablet Take one tablet by mouth 3 times on the day prior to surgery. Take one tablet at 1pm, 2pm and 9pm. (Patient not taking: Reported on 3/6/2023) 3 tablet 0    neomycin (MYCIFRADIN) 500 MG tablet Take two tablets 3 times the day before surgery. Take two tablets at 1pm, two tablets at 2pm and two tablets at 9pm. (Patient not taking: Reported on 3/6/2023) 6 tablet 0    aspirin 81 MG tablet Take 81 mg by mouth daily (Patient not taking: Reported on 3/6/2023)       No current facility-administered medications on file prior to visit. Past Medical History:   Diagnosis Date    Aortic valve disorder     Arthritis     Blood transfusion     BPH (benign prostatic hypertrophy)     Cancer (HCC)     SKIN.     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    Diverticulosis of colon     Diverticulosis of large intestine 05/15/2010    Essential hypertension 01/17/2013    History of blood transfusion     History of GI bleed 04/2012    Hydronephrosis, left 07/02/2016    Hyperlipidemia     Left carpal tunnel syndrome 01/26/2022    Mitral valve disorder     MSSA [facility]icillin susceptible St[facility] aureus) [facility] 03/2012 Osteoarthritis     neck, back, knees    Partial small bowel obstruction (HCC)     Sleep apnea     Staph aureus infection 03/10/2012    blood    TIA (transient ischemic attack) 05/2012        Past Surgical History:   Procedure Laterality Date    AORTIC VALVE SURGERY      REPAIR    CARDIAC SURGERY  06/2012    angiogram-no blockage    COLONOSCOPY      COLONOSCOPY N/A 08/31/2022    COLONOSCOPY WITH BIOPSY performed by Jackelin Mullins MD at [facility] N/A 08/31/2022    COLONOSCOPY POLYPECTOMY SNARE/COLD BIOPSY performed by Jackelin Mullins MD at [facility] 103 N/A 08/31/2022    COLONOSCOPY SUBMUCOSAL/ SPOT  INJECTION performed by Jackelin Mullins MD at [facility]. JOINT REPLACEMENT      RIGHT KNEE TOTAL 11/05, left knee 2006    KNEE PROSTHESIS REMOVAL Left     KNEE SURGERY  04/2012    to remove blood clot on right knee    KNEE SURGERY  03/2012    for MSSA infection bilat. knees    MITRAL VALVE SURGERY      REPAIR    OTHER SURGICAL HISTORY  07/03/2016    CYSTOSCOPY, LEFT URETEROSCOPY STONE MANIPULATION WITH LASER,    TOTAL KNEE ARTHROPLASTY      bilat    UPPER GASTROINTESTINAL ENDOSCOPY N/A 08/31/2022    EGD BIOPSY performed by Jackelin Mullins MD at [address] [place] [address] Avenue         Allergies   Allergen Reactions    Demerol Nausea And Vomiting    Oxycodone Hcl Nausea And Vomiting    Meperidine Nausea And Vomiting       Social History:  Reviewed. reports that he has never smoked. He has never used smokeless tobacco. He reports current alcohol use. He reports that he does not use drugs. Family History:  Reviewed. family history includes Cancer in his brother; Heart Disease in his father; Stroke in his father and mother. Review of System:    Constitutional: No fevers, chills. Eyes: No visual changes or diplopia. No scleral icterus. ENT: No Headaches. No mouth sores or sore throat.   Cardiovascular: No for chest pain, No for dyspnea on exertion, No for palpitations or No for loss of consciousness. No cough, hemoptysis, No for pleuritic pain, or phlebitis. Respiratory: No for cough or wheezing. No hematemesis. Gastrointestinal: No abdominal pain, blood in stools. Genitourinary: No dysuria, or hematuria. Musculoskeletal: No gait disturbance,    Integumentary: No rash or pruritis. Neurological: No headache, change in muscle strength, numbness or tingling. Psychiatric: No anxiety, or depression. Endocrine: No temperature intolerance. No excessive thirst, fluid intake, or urination. Hem/Lymph: No abnormal bruising or bleeding, blood clots or swollen lymph nodes. Allergic/Immunologic: No nasal congestion or hives. Physical Examination:  Vitals:    03/06/23 1536   BP: 116/76   Pulse: 76         Wt Readings from Last 3 Encounters:   03/06/23 173 lb (78.5 kg)   02/17/23 171 lb 3.2 oz (77.7 kg)   01/31/23 168 lb (76.2 kg)       Constitutional: Oriented. No distress. Head: Normocephalic and atraumatic. Mouth/Throat: Oropharynx is clear and moist.   Eyes: Conjunctivae clear without jaunduice. PERRL. Neck: Neck supple. No rigidity. No JVD present. Cardiovascular: Normal rate, regular rhythm, S1&S2. Pulmonary/Chest: Bilateral respiratory sounds. No wheezes, No rhonchi. Abdominal: Soft. Bowel sounds present. No distension, No tenderness. Musculoskeletal: No tenderness. No edema    Lymphadenopathy: Has no cervical adenopathy. Neurological: Alert and oriented. Cranial nerve appears intact, No Gross deficit   Skin: Skin is warm and dry. No rash noted. Psychiatric: Has a normal mood, affect and behavior     Labs:  Reviewed. No results for input(s): NA, K, CL, CO2, PHOS, BUN, CREATININE, CA in the last 72 hours. Invalid input(s):  TSH  No results for input(s): WBC, HGB, HCT, MCV, PLT in the last 72 hours.   Lab Results   Component Value Date/Time    CKTOTAL 175 03/09/2012 03:40 PM    TROPONINI <0.01 08/24/2017 01:45 AM     Lab Results   Component Value Date/Time    BNP 57.0 11/29/2013 03:07 PM     Lab Results   Component Value Date/Time    PROTIME 13.6 02/17/2023 10:12 AM    PROTIME 14.0 01/27/2023 08:48 AM    PROTIME 12.6 01/11/2013 11:26 AM    INR 1.05 02/17/2023 10:12 AM    INR 1.09 01/27/2023 08:48 AM    INR 1.11 01/11/2013 11:26 AM     Lab Results   Component Value Date/Time    CHOL 158 02/17/2023 10:12 AM    HDL 55 02/17/2023 10:12 AM    HDL 43 05/23/2012 06:31 AM    TRIG 81 02/17/2023 10:12 AM       ECG: Personally reviewed: AF, , AV sequential, HR 77, , QRS 72, QTc 375    ECHO:  8/13/2021  Summary  Left ventricular cavity size is normal. There is slight asymmetric hypertrophy of the basal septum. Left ventricular function is normal  with ejection fraction estimated at 55-60%. Normal diastolic function. Mitral valve repair noted. Trivial aortic regurgitation is present. The right ventricle is mildly enlarged. Right ventricular systolic function visually appears normal. Estimated pulmonary artery systolic pressure is at 33 mmHg assuming a right atrial pressure of 3 mmHg. Stress Test: 1/9/2019  Summary    Overall findings represent a low risk scan. There is normal isotope uptake at stress and rest. There is no evidence of    myocardial ischemia or scar. Normal LV size and systolic function. Non-diagnostic EKG response due to failure to reach target heart rate.      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 Sinus bradycardia    2. CHB (complete heart block) ([facility])    3. Cardiac pacemaker in situ        Cardiac HX: Nhan Hays is a 78 y.o. man with a h/o HTN, HLD, TIA, colon CA, s/p resection), LHC (6/2012, Dr. Royce North) showed normal cors, s/p AV repair, MV repair (2012), follows with Dr. Royce North at [facility] monitor (12/2022) showed 278 episodes of high degree AV block,had seen Dr. Justin Peña and a PPM was recommended however patient wanted a second opinion, s/p dual ch MDT PPM (1/31/23, Dr. Jeniffer Villegas). CHB  - S/p dual ch MDT PPM  - L chest healed well  - Review of device today shows 45.6% AP, 43.5% , no arrhythmias, other parameters stable. - Reviewed activity restrictions with patient and wife at length. - F/u in May  - ECG ordered and results personally reviewed       EF of 55 to 12%   No systolic HF  No known CAD  No known AF  No tobacco use. All questions and concerns were addressed to the patient/family. Alternatives to my treatment were discussed. The note was completed using EMR. Every effort was made to ensure accuracy; however, inadvertent computerized transcription errors may be present. Patient received education regarding their diagnosis, treatment and medications while in the office today.     Hellen Sheppard CNP  [facility]       I  have spent 41 minutes in care of the patient including direct face to face time, chart preparation, reviewing diagnostic testing, other provider notes and coordinating patient care.

## 2023-03-06 ENCOUNTER — NURSE ONLY (OUTPATIENT)
Dept: CARDIOLOGY CLINIC | Age: 83
End: 2023-03-06
Payer: MEDICARE

## 2023-03-06 ENCOUNTER — OFFICE VISIT (OUTPATIENT)
Dept: CARDIOLOGY CLINIC | Age: 83
End: 2023-03-06
Payer: MEDICARE

## 2023-03-06 VITALS
BODY MASS INDEX: 30.65 KG/M2 | HEART RATE: 76 BPM | SYSTOLIC BLOOD PRESSURE: 116 MMHG | WEIGHT: 173 LBS | DIASTOLIC BLOOD PRESSURE: 76 MMHG

## 2023-03-06 DIAGNOSIS — I44.30 AV BLOCK: ICD-10-CM

## 2023-03-06 DIAGNOSIS — I44.2 CHB (COMPLETE HEART BLOCK) (HCC): ICD-10-CM

## 2023-03-06 DIAGNOSIS — R00.1 SINUS BRADYCARDIA: Primary | ICD-10-CM

## 2023-03-06 DIAGNOSIS — Z95.0 CARDIAC PACEMAKER IN SITU: ICD-10-CM

## 2023-03-06 DIAGNOSIS — Z95.0 CARDIAC PACEMAKER IN SITU: Primary | ICD-10-CM

## 2023-03-06 DIAGNOSIS — I45.5 SINUS PAUSE: ICD-10-CM

## 2023-03-06 PROCEDURE — 1124F ACP DISCUSS-NO DSCNMKR DOCD: CPT | Performed by: NURSE PRACTITIONER

## 2023-03-06 PROCEDURE — 99215 OFFICE O/P EST HI 40 MIN: CPT | Performed by: NURSE PRACTITIONER

## 2023-03-06 PROCEDURE — 93280 PM DEVICE PROGR EVAL DUAL: CPT | Performed by: INTERNAL MEDICINE

## 2023-03-06 NOTE — PROGRESS NOTES
Patient comes in for a 1 month programming evaluation on their Medtronic DC PPM.     Relay monitored is active. Incision appears to have healed nicely. All sensing and pacing parameters appear unchanged since in office check on 02.07.2023   Competitive Atrial pacing noted on presenting. MVP On. Attempted DDIR @ 60 bpm with same results. P/SAVD set at 350 ms. 11.4 years estimated until BALDEV/RRT. Underlying AsVs w/intermittent HB. AP 45.6%.  43.5%. PVC 2.4/hr  Please see interrogation for more detail. Patient will see NPFW today. We will continue to monitor remotely.

## 2023-03-08 NOTE — PROGRESS NOTES
Place patient label inside box (if no patient label, complete below)  Name:  :  MR#:     Brett Loredo / PROCEDURE  I (we)JEFF NOEL B.  authorize DR Ofelia Rodriguez  and/or such assistants as may be selected by him/her, to perform the following operation/procedure(s): ROBOTIC LOW ANTERIOR RESECTION       Note: If unable to obtain consent prior to an emergent procedure, document the emergent reason in the medical record. This procedure has been explained to my (our) satisfaction and included in the explanation was: The intended benefit, nature, and extent of the procedure to be performed; The significant risks involved and the probability of success; Alternative procedures and methods of treatment; The dangers and probable consequences of such alternatives (including no procedure or treatment); The expected consequences of the procedure on my future health; Whether other qualified individuals would be performing important surgical tasks and/or whether  would be present to advise or support the procedure. I (we) understand that there are other risks of infection and other serious complications in the pre-operative/procedural and postoperative/procedural stages of my (our) care. I (we) have asked all of the questions which I (we) thought were important in deciding whether or not to undergo treatment or diagnosis. These questions have been answered to my (our) satisfaction. I (we) understand that no assurance can be given that the procedure will be a success, and no guarantee or warranty of success has been given to me (us). It has been explained to me (us) that during the course of the operation/procedure, unforeseen conditions may be revealed that necessitate extension of the original procedure(s) or different procedure(s) than those set forth in Paragraph 1.  I (we) authorize and request that the above-named physician, his/her assistants or his/her designees, perform procedures as necessary and desirable if deemed to be in my (our) best interest.     Revised 8/2/2021                                                                          Page 1 of 2           I acknowledge that health care personnel may be observing this procedure for the purpose of medical education or other specified purposes as may be necessary as requested and/or approved by my (our) physician. I (we) consent to the disposal by the hospital Pathologist of the removed tissue, parts or organs in accordance with hospital policy. I do ____ do not ____ consent to the use of a local infiltration pain blocking agent that will be used by my provider/surgical provider to help alleviate pain during my procedure. I do ____ do not ____ consent to an emergent blood transfusion in the case of a life-threatening situation that requires blood components to be administered. This consent is valid for 24 hours from the beginning of the procedure. This patient does ____ or does not ____ currently have a DNR status/order. If DNR order is in place, obtain Addendum to the Surgical Consent for ALL Patients with a DNR Order to address jeff-operative status for limited intervention or DNR suspension.      I have read and fully understand the above Consent for Operation/Procedure and that all blanks were completed before I signed the consent.   _____________________________       _____________________      ____/____am/pm  Signature of Patient or legal representative      Printed Name / Relationship            Date / Time   ____________________________       _____________________      ____/____am/pm  Witness to Signature                                    Printed Name                    Date / Time    If patient is unable to sign or is a minor, complete the following)  Patient is a minor, ____ years of age, or unable to sign because: ______________________________________________________________________________________________    If a phone consent is obtained, consent will be documented by using two health care professionals, each affirming that the consenting party has no questions and gives consent for the procedure discussed with the physician/provider.   _____________________          ____________________       _____/_____am/pm   2nd witness to phone consent        Printed name           Date / Time    Informed Consent:  I have provided the explanation described above in section 1 to the patient and/or legal representative.  I have provided the patient and/or legal representative with an opportunity to ask any questions about the proposed operation/procedure.   ___________________________          ____________________         ____/____am/pm  Provider / Proceduralist                            Printed name            Date / Time  Revised 8/2/2021                                                                      Page 2 of 2

## 2023-03-08 NOTE — PROGRESS NOTES
[facility] PRE-SURGICAL TESTING INSTRUCTIONS                      PRIOR TO PROCEDURE DATE:    1. PLEASE FOLLOW ANY INSTRUCTIONS GIVEN TO YOU PER YOUR SURGEON. 2. Arrange for someone to drive you home and be with you for the first 24 hours after discharge for your safety after your procedure for which you received sedation. Ensure it is someone we can share information with regarding your discharge. NOTE: At this time ONLY 2 ADULTS may accompany you   One person ENCOURAGED to stay at hospital entire time if outpatient surgery      3. You must contact your surgeon for instructions IF:  You are taking any blood thinners, aspirin, anti-inflammatory or vitamins. There is a change in your physical condition such as a cold, fever, rash, cuts, sores, or any other infection, especially near your surgical site. 4. Do not drink alcohol the day before or day of your procedure. Do not use any recreational marijuana at least 24 hours or street drugs (heroin, cocaine) at minimum 5 days prior to your procedure. 5. A Pre-Surgical History and Physical MUST be completed WITHIN 30 DAYS OR LESS prior to your procedure. by your Physician or an Urgent Care        THE DAY OF YOUR PROCEDURE:  1. Follow instructions for ARRIVAL TIME as DIRECTED BY YOUR SURGEON. 2. Enter the MAIN entrance from [email or web address] and follow the signs to the free Parking [employer] or [employer] (offered free of charge 7 am-5pm). 3. Enter the Main Entrance of the hospital (do not enter from the lower level of the parking garage). Upon entrance, check in with the  at the surgical information desk on your LEFT. Bring your insurance card and photo ID to register      4. DO NOT EAT ANYTHING 8 hours prior to arrival for surgery. You may have up to 8 ounces of water 4 hours prior to your arrival for surgery.    NOTE: ALL Gastric, Bariatric & Bowel surgery patients - you MUST follow your surgeon's instructions regarding eating/ drinking as you will have very specific instructions to follow. If you did not receive these, call your surgeon's office immediately. 5. MEDICATIONS:  Take the following medications with a SMALL sip of water: FLOMAX  Use your usual dose of inhalers the morning of surgery. BRING your rescue inhaler with you to hospital.   Anesthesia does NOT want you to take insulin the morning of surgery. They will control your blood sugar while you are at the hospital. Please contact your ordering physician for instructions regarding your insulin the night before your procedure. If you have an insulin pump, please keep it set on basal rate. Bariatric patient's call your surgeon if on diabetic medications as some may need to be stopped 1 week prior to surgery    6. Do not swallow additional water when brushing teeth. No gum, candy, mints, or ice chips. Refrain from smoking or at least decrease the amount on day of surgery. 7. Morning of surgery:   Take a shower with an antibacterial soap (i.e., Safeguard or Dial) OR your physician may have instructed you to use Hibiclens. Dress in loose, comfortable clothing appropriate for redressing after your procedure. Do not wear jewelry (including body piercings), make-up (especially NO eye make-up), fingernail polish (NO toenail polish if foot/leg surgery), lotion, powders, or metal hairclips. Do not shave or wax for 72 hours prior to procedure near your operative site. Shaving with a razor can irritate your skin and make it easier to develop an infection. On the day of your procedure, any hair that needs to be removed near the surgical site will be 'clipped' by a healthcare worker using a special clipper designed to avoid skin irritation. 8. Dentures, glasses, or contacts will need to be removed before your procedure. Bring cases for your glasses, contacts, dentures, or hearing aids to protect them while you are in surgery.       9. If you use a CPAP, please bring it with you on the day of your procedure. 10. We recommend that valuable personal belongings such as cash, cell phones, e-tablets, or jewelry, be left at home during your stay. The hospital will not be responsible for valuables that are not secured in the hospital safe. However, if your insurance requires a co-pay, you may want to bring a method of payment, i.e., Check or credit card, if you wish to pay your co-pay the day of surgery. 11. If you are to stay overnight, you may bring a bag with personal items. Please have any large items you may need brought in by your family after your arrival to your hospital room. 12. If you have a Living Will or Durable Power of , please bring a copy on the day of your procedure. How we keep you safe and work to prevent surgical site infections:   1. Health care workers should always check your ID bracelet to verify your name and birth date. You will be asked many times to state your name, date of birth, and allergies. 2. Health care workers should always clean their hands with soap or alcohol gel before providing care to you. It is okay to ask anyone if they cleaned their hands before they touch you. 3. You will be actively involved in verifying the type of procedure you are having and ensuring the correct surgical site. This will be confirmed multiple times prior to your procedure. Do NOT nicole your surgery site UNLESS instructed to by your surgeon. 4. When you are in the operating room, your surgical site will be cleansed with a special soap, and in most cases, you will be given an antibiotic before the surgery begins. What to expect AFTER your procedure? 1. Immediately following your procedure, your will be taken to the PACU for the first phase of your recovery. Your nurse will help you recover from any potential side effects of anesthesia, such as extreme drowsiness, changes in your vital signs or breathing patterns.  Nausea, headache, muscle aches, or sore throat may also occur after anesthesia. Your nurse will help you manage these potential side effects. 2. For comfort and safety, arrange to have someone at home with you for the first 24 hours after discharge. 3. You and your family will be given written instructions about your diet, activity, dressing care, medications, and return visits. 4. Once at home, should issues with nausea, pain, or bleeding occur, or should you notice any signs of infection, you should call your surgeon. 5. Always clean your hands before and after caring for your wound. Do not let your family touch your surgery site without cleaning their hands. 6. Narcotic pain medications can cause significant constipation. You may want to add a stool softener to your postoperative medication schedule or speak to your surgeon on how best to manage this SIDE EFFECT. SPECIAL INSTRUCTIONS     Thank you for allowing us to care for you. We strive to exceed your expectations in the delivery of care and service provided to you and your family. If you need to contact the [place] [address] staff for any reason, please call us at [phone]    Instructions reviewed with patient during preadmission testing phone interview.   Mindi Cantu RN.3/8/2023 .8:22 AM      ADDITIONAL EDUCATIONAL INFORMATION REVIEWED PER PHONE WITH YOU AND/OR YOUR FAMILY:  No Hibiclens® Bathing Instructions   Yes Antibacterial Soap

## 2023-03-10 ENCOUNTER — TELEPHONE (OUTPATIENT)
Dept: SURGERY | Age: 83
End: 2023-03-10

## 2023-03-10 NOTE — TELEPHONE ENCOUNTER
I have placed a reminder call to patient for upcoming procedure. Did you speak directly to patient or leave a voicemail? Spoke to with Jenniffer Mata? NPO 12AM  Prep #2    Patient will be admitted.     Arrive at the main entrance [facility] at 5:30am

## 2023-03-13 ENCOUNTER — ANESTHESIA EVENT (OUTPATIENT)
Dept: OPERATING ROOM | Age: 83
DRG: 334 | End: 2023-03-13
Payer: MEDICARE

## 2023-03-13 ENCOUNTER — HOSPITAL ENCOUNTER (INPATIENT)
Age: 83
LOS: 2 days | Discharge: HOME | DRG: 334 | End: 2023-03-15
Attending: SURGERY | Admitting: SURGERY
Payer: MEDICARE

## 2023-03-13 ENCOUNTER — ANESTHESIA (OUTPATIENT)
Dept: OPERATING ROOM | Age: 83
DRG: 334 | End: 2023-03-13
Payer: MEDICARE

## 2023-03-13 DIAGNOSIS — K62.89 RECTAL MASS: ICD-10-CM

## 2023-03-13 DIAGNOSIS — G89.18 ACUTE POST-OPERATIVE PAIN: Primary | ICD-10-CM

## 2023-03-13 LAB
ABO/RH: NORMAL
ANION GAP SERPL CALCULATED.3IONS-SCNC: 10 MMOL/L (ref 3–16)
ANTIBODY SCREEN: NORMAL
BASOPHILS ABSOLUTE: 0 K/UL (ref 0–0.2)
BASOPHILS RELATIVE PERCENT: 0.6 %
BUN BLDV-MCNC: 14 MG/DL (ref 7–20)
CALCIUM SERPL-MCNC: 9.1 MG/DL (ref 8.3–10.6)
CHLORIDE BLD-SCNC: 94 MMOL/L (ref 99–110)
CO2: 26 MMOL/L (ref 21–32)
CREAT SERPL-MCNC: 1 MG/DL (ref 0.8–1.3)
EOSINOPHILS ABSOLUTE: 0.1 K/UL (ref 0–0.6)
EOSINOPHILS RELATIVE PERCENT: 1.5 %
GFR SERPL CREATININE-BSD FRML MDRD: >60 ML/MIN/{1.73_M2}
GLUCOSE BLD-MCNC: 91 MG/DL (ref 70–99)
HCT VFR BLD CALC: 44.1 % (ref 40.5–52.5)
HEMOGLOBIN: 15 G/DL (ref 13.5–17.5)
LYMPHOCYTES ABSOLUTE: 0.8 K/UL (ref 1–5.1)
LYMPHOCYTES RELATIVE PERCENT: 14.6 %
MCH RBC QN AUTO: 30.3 PG (ref 26–34)
MCHC RBC AUTO-ENTMCNC: 34 G/DL (ref 31–36)
MCV RBC AUTO: 89 FL (ref 80–100)
MONOCYTES ABSOLUTE: 0.7 K/UL (ref 0–1.3)
MONOCYTES RELATIVE PERCENT: 11.7 %
NEUTROPHILS ABSOLUTE: 4 K/UL (ref 1.7–7.7)
NEUTROPHILS RELATIVE PERCENT: 71.6 %
PDW BLD-RTO: 14.6 % (ref 12.4–15.4)
PLATELET # BLD: 258 K/UL (ref 135–450)
PMV BLD AUTO: 7 FL (ref 5–10.5)
POTASSIUM SERPL-SCNC: 4 MMOL/L (ref 3.5–5.1)
RBC # BLD: 4.96 M/UL (ref 4.2–5.9)
SODIUM BLD-SCNC: 130 MMOL/L (ref 136–145)
WBC # BLD: 5.6 K/UL (ref 4–11)

## 2023-03-13 PROCEDURE — 2580000003 HC RX 258: Performed by: SURGERY

## 2023-03-13 PROCEDURE — 3600000019 HC SURGERY ROBOT ADDTL 15MIN: Performed by: SURGERY

## 2023-03-13 PROCEDURE — 0DBP4ZZ EXCISION OF RECTUM, PERCUTANEOUS ENDOSCOPIC APPROACH: ICD-10-PCS | Performed by: SURGERY

## 2023-03-13 PROCEDURE — 88304 TISSUE EXAM BY PATHOLOGIST: CPT

## 2023-03-13 PROCEDURE — 64488 TAP BLOCK BI INJECTION: CPT | Performed by: FAMILY MEDICINE

## 2023-03-13 PROCEDURE — 2580000003 HC RX 258

## 2023-03-13 PROCEDURE — 3700000001 HC ADD 15 MINUTES (ANESTHESIA): Performed by: SURGERY

## 2023-03-13 PROCEDURE — C1729 CATH, DRAINAGE: HCPCS | Performed by: SURGERY

## 2023-03-13 PROCEDURE — 88307 TISSUE EXAM BY PATHOLOGIST: CPT

## 2023-03-13 PROCEDURE — 2500000003 HC RX 250 WO HCPCS: Performed by: FAMILY MEDICINE

## 2023-03-13 PROCEDURE — 3700000000 HC ANESTHESIA ATTENDED CARE: Performed by: SURGERY

## 2023-03-13 PROCEDURE — 2580000003 HC RX 258: Performed by: FAMILY MEDICINE

## 2023-03-13 PROCEDURE — 6360000002 HC RX W HCPCS: Performed by: FAMILY MEDICINE

## 2023-03-13 PROCEDURE — 3600000009 HC SURGERY ROBOT BASE: Performed by: SURGERY

## 2023-03-13 PROCEDURE — 85025 COMPLETE CBC W/AUTO DIFF WBC: CPT

## 2023-03-13 PROCEDURE — 2500000003 HC RX 250 WO HCPCS

## 2023-03-13 PROCEDURE — C9290 INJ, BUPIVACAINE LIPOSOME: HCPCS | Performed by: FAMILY MEDICINE

## 2023-03-13 PROCEDURE — 2720000010 HC SURG SUPPLY STERILE: Performed by: SURGERY

## 2023-03-13 PROCEDURE — 6360000002 HC RX W HCPCS: Performed by: SURGERY

## 2023-03-13 PROCEDURE — A4217 STERILE WATER/SALINE, 500 ML: HCPCS | Performed by: SURGERY

## 2023-03-13 PROCEDURE — 2500000003 HC RX 250 WO HCPCS: Performed by: SURGERY

## 2023-03-13 PROCEDURE — 6370000000 HC RX 637 (ALT 250 FOR IP): Performed by: NURSE PRACTITIONER

## 2023-03-13 PROCEDURE — 86901 BLOOD TYPING SEROLOGIC RH(D): CPT

## 2023-03-13 PROCEDURE — 86900 BLOOD TYPING SEROLOGIC ABO: CPT

## 2023-03-13 PROCEDURE — 7100000001 HC PACU RECOVERY - ADDTL 15 MIN: Performed by: SURGERY

## 2023-03-13 PROCEDURE — 7100000000 HC PACU RECOVERY - FIRST 15 MIN: Performed by: SURGERY

## 2023-03-13 PROCEDURE — 2709999900 HC NON-CHARGEABLE SUPPLY: Performed by: SURGERY

## 2023-03-13 PROCEDURE — 6360000002 HC RX W HCPCS

## 2023-03-13 PROCEDURE — 1200000000 HC SEMI PRIVATE

## 2023-03-13 PROCEDURE — 80048 BASIC METABOLIC PNL TOTAL CA: CPT

## 2023-03-13 PROCEDURE — 44207 L COLECTOMY/COLOPROCTOSTOMY: CPT | Performed by: SURGERY

## 2023-03-13 PROCEDURE — P9041 ALBUMIN (HUMAN),5%, 50ML: HCPCS

## 2023-03-13 PROCEDURE — 8E0W4CZ ROBOTIC ASSISTED PROCEDURE OF TRUNK REGION, PERCUTANEOUS ENDOSCOPIC APPROACH: ICD-10-PCS | Performed by: SURGERY

## 2023-03-13 PROCEDURE — S2900 ROBOTIC SURGICAL SYSTEM: HCPCS | Performed by: SURGERY

## 2023-03-13 PROCEDURE — 86850 RBC ANTIBODY SCREEN: CPT

## 2023-03-13 RX ORDER — VASOPRESSIN 20 U/ML
INJECTION PARENTERAL PRN
Status: DISCONTINUED | OUTPATIENT
Start: 2023-03-13 | End: 2023-03-13 | Stop reason: SDUPTHER

## 2023-03-13 RX ORDER — SODIUM CHLORIDE 0.9 % (FLUSH) 0.9 %
5-40 SYRINGE (ML) INJECTION PRN
Status: DISCONTINUED | OUTPATIENT
Start: 2023-03-13 | End: 2023-03-13 | Stop reason: HOSPADM

## 2023-03-13 RX ORDER — DEXAMETHASONE SODIUM PHOSPHATE 4 MG/ML
INJECTION, SOLUTION INTRA-ARTICULAR; INTRALESIONAL; INTRAMUSCULAR; INTRAVENOUS; SOFT TISSUE PRN
Status: DISCONTINUED | OUTPATIENT
Start: 2023-03-13 | End: 2023-03-13 | Stop reason: SDUPTHER

## 2023-03-13 RX ORDER — TRAMADOL HYDROCHLORIDE 50 MG/1
50 TABLET ORAL EVERY 6 HOURS PRN
Status: DISCONTINUED | OUTPATIENT
Start: 2023-03-13 | End: 2023-03-15 | Stop reason: HOSPADM

## 2023-03-13 RX ORDER — PROCHLORPERAZINE EDISYLATE 5 MG/ML
5 INJECTION INTRAMUSCULAR; INTRAVENOUS
Status: DISCONTINUED | OUTPATIENT
Start: 2023-03-13 | End: 2023-03-13 | Stop reason: HOSPADM

## 2023-03-13 RX ORDER — IPRATROPIUM BROMIDE AND ALBUTEROL SULFATE 2.5; .5 MG/3ML; MG/3ML
1 SOLUTION RESPIRATORY (INHALATION)
Status: DISCONTINUED | OUTPATIENT
Start: 2023-03-13 | End: 2023-03-13 | Stop reason: HOSPADM

## 2023-03-13 RX ORDER — SODIUM CHLORIDE 0.9 % (FLUSH) 0.9 %
5-40 SYRINGE (ML) INJECTION EVERY 12 HOURS SCHEDULED
Status: DISCONTINUED | OUTPATIENT
Start: 2023-03-13 | End: 2023-03-15 | Stop reason: HOSPADM

## 2023-03-13 RX ORDER — MAGNESIUM HYDROXIDE 1200 MG/15ML
LIQUID ORAL CONTINUOUS PRN
Status: DISCONTINUED | OUTPATIENT
Start: 2023-03-13 | End: 2023-03-13 | Stop reason: HOSPADM

## 2023-03-13 RX ORDER — CIPROFLOXACIN 2 MG/ML
400 INJECTION, SOLUTION INTRAVENOUS ONCE
Status: COMPLETED | OUTPATIENT
Start: 2023-03-13 | End: 2023-03-13

## 2023-03-13 RX ORDER — ONDANSETRON 2 MG/ML
INJECTION INTRAMUSCULAR; INTRAVENOUS PRN
Status: DISCONTINUED | OUTPATIENT
Start: 2023-03-13 | End: 2023-03-13 | Stop reason: SDUPTHER

## 2023-03-13 RX ORDER — ENOXAPARIN SODIUM 100 MG/ML
40 INJECTION SUBCUTANEOUS ONCE
Status: COMPLETED | OUTPATIENT
Start: 2023-03-13 | End: 2023-03-13

## 2023-03-13 RX ORDER — PHENYLEPHRINE HYDROCHLORIDE 10 MG/ML
INJECTION INTRAVENOUS PRN
Status: DISCONTINUED | OUTPATIENT
Start: 2023-03-13 | End: 2023-03-13 | Stop reason: SDUPTHER

## 2023-03-13 RX ORDER — SODIUM CHLORIDE 9 MG/ML
25 INJECTION, SOLUTION INTRAVENOUS PRN
Status: DISCONTINUED | OUTPATIENT
Start: 2023-03-13 | End: 2023-03-13 | Stop reason: HOSPADM

## 2023-03-13 RX ORDER — FENTANYL CITRATE 50 UG/ML
INJECTION, SOLUTION INTRAMUSCULAR; INTRAVENOUS PRN
Status: DISCONTINUED | OUTPATIENT
Start: 2023-03-13 | End: 2023-03-13 | Stop reason: SDUPTHER

## 2023-03-13 RX ORDER — ALBUMIN, HUMAN INJ 5% 5 %
SOLUTION INTRAVENOUS PRN
Status: DISCONTINUED | OUTPATIENT
Start: 2023-03-13 | End: 2023-03-13 | Stop reason: SDUPTHER

## 2023-03-13 RX ORDER — ACETAMINOPHEN 650 MG/1
650 SUPPOSITORY RECTAL
Status: DISCONTINUED | OUTPATIENT
Start: 2023-03-13 | End: 2023-03-13 | Stop reason: HOSPADM

## 2023-03-13 RX ORDER — ONDANSETRON 2 MG/ML
4 INJECTION INTRAMUSCULAR; INTRAVENOUS
Status: DISCONTINUED | OUTPATIENT
Start: 2023-03-13 | End: 2023-03-13 | Stop reason: HOSPADM

## 2023-03-13 RX ORDER — SODIUM CHLORIDE 0.9 % (FLUSH) 0.9 %
5-40 SYRINGE (ML) INJECTION PRN
Status: DISCONTINUED | OUTPATIENT
Start: 2023-03-13 | End: 2023-03-15 | Stop reason: HOSPADM

## 2023-03-13 RX ORDER — OXYCODONE HYDROCHLORIDE 5 MG/1
5 TABLET ORAL EVERY 4 HOURS PRN
Status: DISCONTINUED | OUTPATIENT
Start: 2023-03-13 | End: 2023-03-13

## 2023-03-13 RX ORDER — ENOXAPARIN SODIUM 100 MG/ML
40 INJECTION SUBCUTANEOUS DAILY
Status: DISCONTINUED | OUTPATIENT
Start: 2023-03-14 | End: 2023-03-15 | Stop reason: HOSPADM

## 2023-03-13 RX ORDER — SODIUM CHLORIDE, SODIUM LACTATE, POTASSIUM CHLORIDE, CALCIUM CHLORIDE 600; 310; 30; 20 MG/100ML; MG/100ML; MG/100ML; MG/100ML
INJECTION, SOLUTION INTRAVENOUS CONTINUOUS
Status: DISCONTINUED | OUTPATIENT
Start: 2023-03-13 | End: 2023-03-13

## 2023-03-13 RX ORDER — ACETAMINOPHEN 325 MG/1
650 TABLET ORAL EVERY 6 HOURS
Status: DISCONTINUED | OUTPATIENT
Start: 2023-03-13 | End: 2023-03-13

## 2023-03-13 RX ORDER — MEPERIDINE HYDROCHLORIDE 25 MG/ML
12.5 INJECTION INTRAMUSCULAR; INTRAVENOUS; SUBCUTANEOUS
Status: DISCONTINUED | OUTPATIENT
Start: 2023-03-13 | End: 2023-03-13 | Stop reason: CLARIF

## 2023-03-13 RX ORDER — BUPIVACAINE HYDROCHLORIDE 2.5 MG/ML
INJECTION, SOLUTION EPIDURAL; INFILTRATION; INTRACAUDAL
Status: COMPLETED | OUTPATIENT
Start: 2023-03-13 | End: 2023-03-13

## 2023-03-13 RX ORDER — SODIUM CHLORIDE, SODIUM LACTATE, POTASSIUM CHLORIDE, CALCIUM CHLORIDE 600; 310; 30; 20 MG/100ML; MG/100ML; MG/100ML; MG/100ML
INJECTION, SOLUTION INTRAVENOUS CONTINUOUS
Status: DISCONTINUED | OUTPATIENT
Start: 2023-03-13 | End: 2023-03-13 | Stop reason: HOSPADM

## 2023-03-13 RX ORDER — PROPOFOL 10 MG/ML
INJECTION, EMULSION INTRAVENOUS PRN
Status: DISCONTINUED | OUTPATIENT
Start: 2023-03-13 | End: 2023-03-13 | Stop reason: SDUPTHER

## 2023-03-13 RX ORDER — FENTANYL CITRATE 50 UG/ML
25 INJECTION, SOLUTION INTRAMUSCULAR; INTRAVENOUS EVERY 5 MIN PRN
Status: DISCONTINUED | OUTPATIENT
Start: 2023-03-13 | End: 2023-03-13 | Stop reason: HOSPADM

## 2023-03-13 RX ORDER — ONDANSETRON 2 MG/ML
4 INJECTION INTRAMUSCULAR; INTRAVENOUS EVERY 6 HOURS PRN
Status: DISCONTINUED | OUTPATIENT
Start: 2023-03-13 | End: 2023-03-15 | Stop reason: HOSPADM

## 2023-03-13 RX ORDER — ACETAMINOPHEN 500 MG
1000 TABLET ORAL EVERY 6 HOURS
Status: DISCONTINUED | OUTPATIENT
Start: 2023-03-13 | End: 2023-03-15 | Stop reason: HOSPADM

## 2023-03-13 RX ORDER — SODIUM CHLORIDE 9 MG/ML
INJECTION, SOLUTION INTRAVENOUS CONTINUOUS
Status: DISCONTINUED | OUTPATIENT
Start: 2023-03-13 | End: 2023-03-14

## 2023-03-13 RX ORDER — LORAZEPAM 2 MG/ML
0.5 INJECTION INTRAMUSCULAR
Status: DISCONTINUED | OUTPATIENT
Start: 2023-03-13 | End: 2023-03-13 | Stop reason: HOSPADM

## 2023-03-13 RX ORDER — SODIUM CHLORIDE 9 MG/ML
INJECTION, SOLUTION INTRAVENOUS PRN
Status: DISCONTINUED | OUTPATIENT
Start: 2023-03-13 | End: 2023-03-15 | Stop reason: HOSPADM

## 2023-03-13 RX ORDER — LABETALOL HYDROCHLORIDE 5 MG/ML
10 INJECTION, SOLUTION INTRAVENOUS
Status: DISCONTINUED | OUTPATIENT
Start: 2023-03-13 | End: 2023-03-13 | Stop reason: HOSPADM

## 2023-03-13 RX ORDER — SODIUM CHLORIDE, SODIUM LACTATE, POTASSIUM CHLORIDE, CALCIUM CHLORIDE 600; 310; 30; 20 MG/100ML; MG/100ML; MG/100ML; MG/100ML
INJECTION, SOLUTION INTRAVENOUS CONTINUOUS PRN
Status: DISCONTINUED | OUTPATIENT
Start: 2023-03-13 | End: 2023-03-13 | Stop reason: SDUPTHER

## 2023-03-13 RX ORDER — SODIUM CHLORIDE 0.9 % (FLUSH) 0.9 %
5-40 SYRINGE (ML) INJECTION EVERY 12 HOURS SCHEDULED
Status: DISCONTINUED | OUTPATIENT
Start: 2023-03-13 | End: 2023-03-13 | Stop reason: HOSPADM

## 2023-03-13 RX ORDER — ROCURONIUM BROMIDE 10 MG/ML
INJECTION, SOLUTION INTRAVENOUS PRN
Status: DISCONTINUED | OUTPATIENT
Start: 2023-03-13 | End: 2023-03-13 | Stop reason: SDUPTHER

## 2023-03-13 RX ADMIN — FENTANYL CITRATE 50 MCG: 50 INJECTION, SOLUTION INTRAMUSCULAR; INTRAVENOUS at 07:25

## 2023-03-13 RX ADMIN — ROCURONIUM BROMIDE 50 MG: 10 INJECTION, SOLUTION INTRAVENOUS at 07:28

## 2023-03-13 RX ADMIN — PHENYLEPHRINE HYDROCHLORIDE 100 MCG: 10 INJECTION INTRAVENOUS at 07:52

## 2023-03-13 RX ADMIN — SODIUM CHLORIDE: 9 INJECTION, SOLUTION INTRAVENOUS at 13:20

## 2023-03-13 RX ADMIN — VASOPRESSIN 1 UNITS: 20 INJECTION INTRAVENOUS at 07:53

## 2023-03-13 RX ADMIN — HYDROMORPHONE HYDROCHLORIDE 0.5 MG: 1 INJECTION, SOLUTION INTRAMUSCULAR; INTRAVENOUS; SUBCUTANEOUS at 11:02

## 2023-03-13 RX ADMIN — PHENYLEPHRINE HYDROCHLORIDE 100 MCG: 10 INJECTION INTRAVENOUS at 07:40

## 2023-03-13 RX ADMIN — SODIUM CHLORIDE, SODIUM LACTATE, POTASSIUM CHLORIDE, AND CALCIUM CHLORIDE: .6; .31; .03; .02 INJECTION, SOLUTION INTRAVENOUS at 09:02

## 2023-03-13 RX ADMIN — METRONIDAZOLE 500 MG: 500 INJECTION, SOLUTION INTRAVENOUS at 07:42

## 2023-03-13 RX ADMIN — BUPIVACAINE HYDROCHLORIDE 60 ML: 2.5 INJECTION, SOLUTION EPIDURAL; INFILTRATION; INTRACAUDAL; PERINEURAL at 10:28

## 2023-03-13 RX ADMIN — PROPOFOL 150 MG: 10 INJECTION, EMULSION INTRAVENOUS at 07:26

## 2023-03-13 RX ADMIN — ACETAMINOPHEN 1000 MG: 500 TABLET ORAL at 18:33

## 2023-03-13 RX ADMIN — DEXAMETHASONE SODIUM PHOSPHATE 4 MG: 4 INJECTION, SOLUTION INTRAMUSCULAR; INTRAVENOUS at 07:42

## 2023-03-13 RX ADMIN — PHENYLEPHRINE HYDROCHLORIDE 200 MCG: 10 INJECTION INTRAVENOUS at 07:45

## 2023-03-13 RX ADMIN — PHENYLEPHRINE HYDROCHLORIDE 100 MCG: 10 INJECTION INTRAVENOUS at 07:33

## 2023-03-13 RX ADMIN — PHENYLEPHRINE HYDROCHLORIDE 30 MCG/MIN: 50 INJECTION INTRAVENOUS at 07:48

## 2023-03-13 RX ADMIN — PHENYLEPHRINE HYDROCHLORIDE 100 MCG: 10 INJECTION INTRAVENOUS at 07:43

## 2023-03-13 RX ADMIN — Medication 80 MG: at 07:25

## 2023-03-13 RX ADMIN — BUPIVACAINE 20 ML: 13.3 INJECTION, SUSPENSION, LIPOSOMAL INFILTRATION at 10:28

## 2023-03-13 RX ADMIN — ROCURONIUM BROMIDE 30 MG: 10 INJECTION, SOLUTION INTRAVENOUS at 08:10

## 2023-03-13 RX ADMIN — SODIUM CHLORIDE, PRESERVATIVE FREE 10 ML: 5 INJECTION INTRAVENOUS at 16:42

## 2023-03-13 RX ADMIN — ALBUMIN (HUMAN) 12.5 G: 12.5 INJECTION, SOLUTION INTRAVENOUS at 07:57

## 2023-03-13 RX ADMIN — SODIUM CHLORIDE, POTASSIUM CHLORIDE, SODIUM LACTATE AND CALCIUM CHLORIDE: 600; 310; 30; 20 INJECTION, SOLUTION INTRAVENOUS at 06:39

## 2023-03-13 RX ADMIN — ENOXAPARIN SODIUM 40 MG: 100 INJECTION SUBCUTANEOUS at 07:03

## 2023-03-13 RX ADMIN — SODIUM CHLORIDE, SODIUM LACTATE, POTASSIUM CHLORIDE, AND CALCIUM CHLORIDE: .6; .31; .03; .02 INJECTION, SOLUTION INTRAVENOUS at 07:15

## 2023-03-13 RX ADMIN — PHENYLEPHRINE HYDROCHLORIDE 100 MCG: 10 INJECTION INTRAVENOUS at 07:34

## 2023-03-13 RX ADMIN — ONDANSETRON 4 MG: 2 INJECTION INTRAMUSCULAR; INTRAVENOUS at 09:57

## 2023-03-13 RX ADMIN — ROCURONIUM BROMIDE 20 MG: 10 INJECTION, SOLUTION INTRAVENOUS at 07:59

## 2023-03-13 RX ADMIN — FENTANYL CITRATE 50 MCG: 50 INJECTION, SOLUTION INTRAMUSCULAR; INTRAVENOUS at 09:21

## 2023-03-13 RX ADMIN — CIPROFLOXACIN 400 MG: 2 INJECTION, SOLUTION INTRAVENOUS at 07:42

## 2023-03-13 RX ADMIN — ALBUMIN (HUMAN) 12.5 G: 12.5 INJECTION, SOLUTION INTRAVENOUS at 08:00

## 2023-03-13 RX ADMIN — HYDROMORPHONE HYDROCHLORIDE 0.5 MG: 1 INJECTION, SOLUTION INTRAMUSCULAR; INTRAVENOUS; SUBCUTANEOUS at 13:05

## 2023-03-13 ASSESSMENT — PAIN DESCRIPTION - LOCATION
LOCATION: ABDOMEN

## 2023-03-13 ASSESSMENT — PAIN - FUNCTIONAL ASSESSMENT: PAIN_FUNCTIONAL_ASSESSMENT: 0-10

## 2023-03-13 ASSESSMENT — PAIN SCALES - GENERAL
PAINLEVEL_OUTOF10: 7
PAINLEVEL_OUTOF10: 7
PAINLEVEL_OUTOF10: 2
PAINLEVEL_OUTOF10: 0

## 2023-03-13 ASSESSMENT — PAIN DESCRIPTION - DESCRIPTORS
DESCRIPTORS: ACHING

## 2023-03-13 ASSESSMENT — LIFESTYLE VARIABLES: SMOKING_STATUS: 0

## 2023-03-13 ASSESSMENT — PAIN DESCRIPTION - ORIENTATION
ORIENTATION: ANTERIOR

## 2023-03-13 NOTE — PROGRESS NOTES
Pt resting in bed. Wife to take glasses, hearing aides, watch to wife. Two clothing bag to locked room with cpap. Labs sent. Cipro and Flaygl on hold to OR. Lovenox to right arm in preop. Two IV placed in preop.

## 2023-03-13 NOTE — PROGRESS NOTES
4 Eyes Admission Assessment     I agree as the admission nurse that 2 RN's have performed a thorough Head to Toe Skin Assessment on the patient. ALL assessment sites listed below have been assessed on admission. Areas assessed by both nurses:   [x]   Head, Face, and Ears   [x]   Shoulders, Back, and Chest  [x]   Arms, Elbows, and Hands   [x]   Coccyx, Sacrum, and Ischium  [x]   Legs, Feet, and Heels        Does the Patient have Skin Breakdown?   No         Vijay Prevention initiated:  No   Wound Care Orders initiated:  No      [facility] nurse consulted for Pressure Injury (Stage 3,4, Unstageable, DTI, NWPT, and Complex wounds) or Vijay score 18 or lower:  No      Nurse 1 eSignature: Electronically signed by Eufemia Briggs RN on 3/13/23 at 6:54 PM EDT    **SHARE this note so that the co-signing nurse is able to place an eSignature**    Nurse 2 eSignature: Electronically signed by Yoselyn Carranza RN on 3/14/23 at 1:04 AM EDT

## 2023-03-13 NOTE — PROGRESS NOTES
Pt arrived form OR, s/p ROBOTIC LOW ANTERIOR RESECTION, report received form CRNA and OR RN, pt sleepy but awakens easily. Pt had a TAP block.   Pt [place]

## 2023-03-13 NOTE — H&P
PRE-OP/PRE-PROCEDURE H&P    Visit Date: 3/13/2023    History:     Carlos aMnuel Arguello is a 78 y.o. male who presents today for a robotic low anterior resection for his large tubulovillous adenomatous polyp found on colonoscopy 12-cm from the anal verge. He recently had a dual-chamber pacemaker placed on 01/31/2023 for his NSVT, AT, and high-degree AV block; healing without issues. Otherwise, patient is doing well. Only reports a headache this AM. Has been off his ASA81 for a week. Patient reports no changes to his medical or surgical history since he received his pacemaker. 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 Prior to Admission medications    Medication Sig Start Date End Date Taking? Authorizing Provider   ondansetron (ZOFRAN-ODT) 4 MG disintegrating tablet Place 1 tablet under the tongue 3 times daily as needed for Nausea or Vomiting  Patient not taking: Reported on 3/6/2023 2/1/23   Raysa Terrell MD   metroNIDAZOLE (FLAGYL) 500 MG tablet Take one tablet by mouth 3 times on the day prior to surgery.  Take one tablet at 1pm, 2pm and 9pm.  Patient not taking: Reported on 3/6/2023 2/1/23   Raysa Terrell MD   neomycin (MYCIFRADIN) 500 MG tablet Take two tablets 3 times the day before surgery. Take two tablets at 1pm, two tablets at 2pm and two tablets at 9pm.  Patient not taking: Reported on 3/6/2023 2/1/23   Jaz Chaparro MD   dutasteride (AVODART) 0.5 MG capsule Take 1 capsule by mouth daily 10/10/22   Cait Leach MD   rosuvastatin (CRESTOR) 10 MG tablet TAKE 1 TABLET BY MOUTH EVERY DAY 8/23/22   Cait Leach MD   tamsulosin [facility]) 0.4 MG capsule Take 2 capsules by mouth daily 8/19/22   Cait Leach MD   aspirin 81 MG tablet Take 81 mg by mouth daily  Patient not taking: No sig reported    Historical Provider, MD   Flaxseed, Linseed, 1000 MG CAPS Take 1 capsule by mouth 2 times daily. Patient not taking: Reported on 3/8/2023    Historical Provider, MD   multivitamin [facility]) per tablet Take 1 tablet by mouth daily. Patient not taking: Reported on 3/8/2023    Historical Provider, MD   Psyllium (METAMUCIL PO) Take 2 Units by mouth daily. 2 TSP. 5/15/10   Historical Provider, MD     Allergies   Allergen Reactions    Demerol Nausea And Vomiting    Oxycodone Hcl Nausea And Vomiting    Meperidine Nausea And Vomiting     Past Medical History:   Diagnosis Date    Aortic valve disorder     Arthritis     Blood transfusion     BPH (benign prostatic hypertrophy)     Cancer (HCC)     SKIN.     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    Diverticulosis of colon     Diverticulosis of large intestine 05/15/2010    Essential hypertension 01/17/2013    History of blood transfusion     History of GI bleed 04/2012    Hydronephrosis, left 07/02/2016    Hyperlipidemia     Left carpal tunnel syndrome 01/26/2022    Mitral valve disorder     MSSA (methicillin susceptible Staphylococcus aureus) septicemia ([address] 03/2012    Osteoarthritis     neck, back, knees    Partial small bowel obstruction ([address]     Sleep apnea     Staph aureus infection 03/10/2012    blood    TIA (transient ischemic attack) 05/2012    Wears glasses     Wears hearing aid in both ears      Past Surgical History:   Procedure Laterality Date    AORTIC VALVE SURGERY      REPAIR    CARDIAC SURGERY  06/2012    angiogram-no blockage    COLONOSCOPY      COLONOSCOPY N/A 08/31/2022    COLONOSCOPY WITH BIOPSY performed by Josee Rene MD at [facility] N/A 08/31/2022    COLONOSCOPY POLYPECTOMY SNARE/COLD BIOPSY performed by Josee Rene MD at [facility] N/A 08/31/2022    COLONOSCOPY SUBMUCOSAL/ SPOT  INJECTION performed by Josee Rene MD at [facility]. JOINT REPLACEMENT      RIGHT KNEE TOTAL 11/05, left knee 2006    KNEE PROSTHESIS REMOVAL Left     KNEE SURGERY  04/2012    to remove blood clot on right knee    KNEE SURGERY  03/2012    for MSSA infection bilat. knees    MITRAL VALVE SURGERY      REPAIR    OTHER SURGICAL HISTORY  07/03/2016    CYSTOSCOPY, LEFT URETEROSCOPY STONE MANIPULATION WITH LASER,    TOTAL KNEE ARTHROPLASTY      bilat    UPPER GASTROINTESTINAL ENDOSCOPY N/A 08/31/2022    EGD BIOPSY performed by Josee Rene MD at [address] [place] [address]           Physical Exam:     Ht 5' 3\" (1.6 m)   Wt 173 lb (78.5 kg)   BMI 30.65 kg/m²  Body mass index is 30.65 kg/m². Constitutional: Appears well-developed and well-nourished. Head: Normocephalic, atraumatic. Eyes: No scleral icterus. Vision intact grossly. ENT: Hearing grossly intact. No facial deformity. Neck: Normal range of motion. No tracheal deviation. Cardiovascular: Normal rate. No peripheral edema. Pulmonary/Chest: Effort normal. No respiratory distress. No wheezes. No use of accessory muscles. L chest with pacemaker C/D/I, healing well, no erythema. Musculoskeletal: No gross deformity. Neurological: Alert and oriented to person, place, and time. No gross deficits. Skin: Skin is dry. No rash noted. No pallor. Psychiatric: Normal mood and affect.  Behavior normal. Oriented to person, place, and time. Abdomen: soft, NTTP, non distended    Recent labs and imaging reviewed as necessary. Assessment/Plan:     Tubulovillous adenoma    Proceed as planned for robotic low anterior resection    Risks/benefits/alternatives of procedure/surgery discussed with patient and any present family members (or appropriate guardian) and understanding verbalized. All questions answered. Patient wishes to proceed.     Electronically signed by Willow Cooney DO on 3/13/2023 at 5:51 AM

## 2023-03-13 NOTE — PROGRESS NOTES
Department of Surgery  Post Op Note    Subjective: pt resting in bed. States he feels ok. Denies nausea. Endorses hunger. States he does have some throat pain. Objective:  Anesthesia type: General      I/O    Intra op    Post op     Fluids    1200 ml 100 ml     EBL  minimal   0 ml     Urine 590 ml  285 ml       Exam: VITALS:  /77   Pulse 72   Temp 97 °F (36.1 °C) (Temporal)   Resp 15   Ht 5' 3\" (1.6 m)   Wt 167 lb 6.4 oz (75.9 kg)   SpO2 97%   BMI 29.65 kg/m²   Post-op vital signs:  Stable     Exam:General appearance: alert, appears stated age, and cooperative  Lungs: clear to auscultation bilaterally  Heart: regular rate and rhythm, S1, S2 normal, no murmur, click, rub or gallop  Abdomen: round, distended, appropriately tender. Incisions with skin glue. Assessment and Plan  Pt is a 78year old male s/p Robotic LAR POD #0    Pain management: tramadol PRN, tylenol scheduled, cepacol lozenge for throat pain.    FeNa:  Diet - regular , Fluids NS @ 100ml/hour   :  Urine output is adequate, hernandez in place  Ambulation: OOB to chair  Respiratory:  IS at bedside, encourage hourly IS and deep breathing  Prophylaxis: PRIYA Calabrese CNP  3/13/2023  3:53 PM  [employer]

## 2023-03-13 NOTE — PROGRESS NOTES
PACU Transfer Note    Vitals:    03/13/23 1450   BP: 106/74   Pulse: 68   Resp: 22   Temp: 97 °F (36.1 °C)   SpO2: 92%       In: 0615 [P.O.:200;  I.V.:2064]  Out: 875 [Urine:875]    Pain assessment:    Pain Level: 2    Report given to Receiving unit RN.    3/13/2023 2:55 PM  Wife sent to room, belongings sent with pt

## 2023-03-13 NOTE — ANESTHESIA PROCEDURE NOTES
Peripheral Block    Patient location during procedure: OR  Reason for block: post-op pain management  Start time: 3/13/2023 10:28 AM  End time: 3/13/2023 10:33 AM  Staffing  Performed: anesthesiologist, resident/CRNA and other anesthesia staff   Anesthesiologist: Javier Shaffer MD  Resident/CRNA: ANUM Rivas CRNA  Peripheral Block   Patient position: supine  Prep: ChloraPrep  Provider prep: mask  Patient monitoring: cardiac monitor, continuous pulse ox, continuous capnometry, frequent blood pressure checks, IV access and oxygen  Block type: TAP  Laterality: bilateral  Injection technique: single-shot  Guidance: ultrasound guided    Needle   Needle type: insulated echogenic nerve stimulator needle   Needle gauge: 20 G  Needle length: 10 cm  Assessment   Injection assessment: negative aspiration for heme, no paresthesia on injection, local visualized surrounding nerve on ultrasound and no intravascular symptoms  Paresthesia pain: immediately resolved  Slow fractionated injection: yes  Hemodynamics: stable  Real-time US image taken/store: yes  Outcomes: uncomplicated    Additional Notes  Tigre TAP block in OR under GETA end of case  30 ml 0.25% bupi and 10 ml 1.3% Exparel  US used. Pics taken. No complications.   Medications Administered  bupivacaine (PF) 0.25 % - Perineural   60 mL - 3/13/2023 10:28:00 AM  bupivacaine liposome 1.3 % - Perineural   20 mL - 3/13/2023 10:28:00 AM

## 2023-03-13 NOTE — ANESTHESIA PRE PROCEDURE
Department of Anesthesiology  Preprocedure Note       Name:  Jeff Berman   Age:  82 y.o.  :  1940                                          MRN:  [id number]         Date:  3/13/2023      Surgeon: Surgeon(s):  Jeferson Cavazos MD    Procedure: Procedure(s):  ROBOTIC LOW ANTERIOR RESECTION    Medications prior to admission:   Prior to Admission medications    Medication Sig Start Date End Date Taking? Authorizing Provider   ondansetron (ZOFRAN-ODT) 4 MG disintegrating tablet Place 1 tablet under the tongue 3 times daily as needed for Nausea or Vomiting  Patient not taking: No sig reported 23   Jeferson Cavazos MD   metroNIDAZOLE (FLAGYL) 500 MG tablet Take one tablet by mouth 3 times on the day prior to surgery. Take one tablet at 1pm, 2pm and 9pm.  Patient not taking: No sig reported 23   Jeferson Cavazos MD   neomycin (MYCIFRADIN) 500 MG tablet Take two tablets 3 times the day before surgery. Take two tablets at 1pm, two tablets at 2pm and two tablets at 9pm.  Patient not taking: No sig reported 23   Jeferson Cavazos MD   dutasteride (AVODART) 0.5 MG capsule Take 1 capsule by mouth daily 10/10/22   Blas Arzate MD   rosuvastatin (CRESTOR) 10 MG tablet TAKE 1 TABLET BY MOUTH EVERY DAY 22   Blas Arzate MD   tamsulosin (FLOMAX) 0.4 MG capsule Take 2 capsules by mouth daily 22   Blas Arzate MD   aspirin 81 MG tablet Take 81 mg by mouth daily    Historical Provider, MD   Flaxseed, Linseed, 1000 MG CAPS Take 1 capsule by mouth 2 times daily    Historical Provider, MD   multivitamin (THERAGRAN) per tablet Take 1 tablet by mouth daily    Historical Provider, MD   Psyllium (METAMUCIL PO) Take 2 Units by mouth daily. 2 TSP. 5/15/10   Historical Provider, MD       Current medications:    No current facility-administered medications for this visit.     No current outpatient medications on file.     Facility-Administered Medications Ordered in Other Visits   Medication Dose Route  Frequency Provider Last Rate Last Admin    lactated ringers IV soln infusion   IntraVENous Continuous Jasson Cooley MD 50 mL/hr at 03/13/23 0639 New Bag at 03/13/23 0644       Allergies: Allergies   Allergen Reactions    Demerol Nausea And Vomiting    Oxycodone Hcl Nausea And Vomiting    Meperidine Nausea And Vomiting       Problem List:    Patient Active Problem List   Diagnosis Code    Osteoarthrosis involving lower leg [id number]    Actinic keratosis L57.0    Hearing loss H91.90    BPH with obstruction/lower urinary tract symptoms N40.1, N13.8    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    TIA (transient ischemic attack) G45.9    Degenerative joint disease of knee M17.9    Joint prosthesis infection or inflammation ([address] T84.50XA    Mitral valve disease I05.9    Aortic valve disease I35.9    H/O endocarditis Z86.79    Infection and inflammatory reaction due to internal joint prosthesis ([address] T84.50XA    History of total knee replacement Z96.659    Squamous cell cancer of scalp and skin of neck C44.42    Mixed hyperlipidemia E78.2    Allergic rhinitis due to pollen J30.1    Mild cognitive impairment G31.84    SHAINA on CPAP G47.33, Z99.89    Prediabetes R73.03    Left carpal tunnel syndrome G56.02    Sinus pause I45.5    Cardiac pacemaker in situ Z95.0       Past Medical History:        Diagnosis Date    Aortic valve disorder     Arthritis     Blood transfusion     BPH (benign prostatic hypertrophy)     Cancer (HCC)     SKIN.    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    Diverticulosis of colon     Diverticulosis of large intestine 05/15/2010    Essential hypertension 01/17/2013    History of blood transfusion     History of GI bleed 04/2012    Hydronephrosis, left 07/02/2016    Hyperlipidemia     Left carpal tunnel syndrome 01/26/2022    Mitral valve disorder     MSSA (methicillin susceptible Staphylococcus aureus) septicemia ([address] 03/2012    Osteoarthritis     neck, back, knees    Partial small bowel obstruction (HCC)     Sleep apnea     Staph aureus infection 03/10/2012    blood    TIA (transient ischemic attack) 05/2012    Wears glasses     Wears hearing aid in both ears        Past Surgical History:        Procedure Laterality Date    AORTIC VALVE SURGERY      REPAIR    CARDIAC SURGERY  06/2012    angiogram-no blockage    COLONOSCOPY      COLONOSCOPY N/A 08/31/2022    COLONOSCOPY WITH BIOPSY performed by Candice Mahan MD at [address] [place] [address] N/A 08/31/2022    COLONOSCOPY POLYPECTOMY SNARE/COLD BIOPSY performed by [facility]ice [facility]an MD at [facility] COLONOSCOPY N/A 08/31/2022    COLONOSCOPY SUBMUCOSAL/ SPOT  INJECTION performed by Candice Mahan MD at [facility].  JOINT REPLACEMENT      RIGHT KNEE TOTAL 11/05, left knee 2006    KNEE PROSTHESIS REMOVAL Left     KNEE SURGERY  04/2012    to remove blood clot on right knee    KNEE SURGERY  03/2012    for MSSA infection bilat. knees    MITRAL VALVE SURGERY      REPAIR    OTHER SURGICAL HISTORY  07/03/2016    CYSTOSCOPY, LEFT URETEROSCOPY STONE MANIPULATION WITH LASER,    PACEMAKER INSERTION Left 01/31/2023    TOTAL KNEE ARTHROPLASTY      bilat    UPPER GASTROINTESTINAL ENDOSCOPY N/A 08/31/2022    EGD BIOPSY performed by Candice Mahan MD at [address] [place] [address] History:    Social History     Tobacco Use    Smoking status: Never    Smokeless tobacco: Never   Substance Use Topics    Alcohol use: Yes     Alcohol/week: 7.0 standard drinks     Types: 7 Shots of liquor per week                                Counseling given: Not Answered      Vital Signs (Current): There were no vitals filed for this visit.                                            BP Readings from Last 3 Encounters:   03/13/23 129/84   03/06/23 116/76   02/17/23 130/80       NPO Status: BMI:   Wt Readings from Last 3 Encounters:   03/13/23 167 lb 6.4 oz (75.9 kg)   03/06/23 173 lb (78.5 kg)   02/17/23 171 lb 3.2 oz (77.7 kg)     There is no height or weight on file to calculate BMI.    CBC:   Lab Results   Component Value Date/Time    WBC 7.5 02/17/2023 10:12 AM    RBC 5.18 02/17/2023 10:12 AM    HGB 15.7 02/17/2023 10:12 AM    HCT 46.1 02/17/2023 10:12 AM    MCV 89.1 02/17/2023 10:12 AM    RDW 15.1 02/17/2023 10:12 AM     02/17/2023 10:12 AM       CMP:   Lab Results   Component Value Date/Time     02/17/2023 10:12 AM    K 4.9 02/17/2023 10:12 AM    K 4.6 06/12/2019 05:53 PM    CL 95 02/17/2023 10:12 AM    CO2 26 02/17/2023 10:12 AM    BUN 14 02/17/2023 10:12 AM    CREATININE 0.9 02/17/2023 10:12 AM    GFRAA >60 09/30/2022 11:09 AM    GFRAA >60 01/11/2013 11:26 AM    AGRATIO 1.5 02/17/2023 10:12 AM    LABGLOM >60 02/17/2023 10:12 AM    GLUCOSE 92 02/17/2023 10:12 AM    GLUCOSE 97 12/13/2011 08:14 AM    PROT 6.8 02/17/2023 10:12 AM    PROT 7.9 01/11/2013 11:26 AM    CALCIUM 9.6 02/17/2023 10:12 AM    BILITOT 1.2 02/17/2023 10:12 AM    ALKPHOS 103 02/17/2023 10:12 AM    AST 28 02/17/2023 10:12 AM    ALT 25 02/17/2023 10:12 AM       POC Tests: No results for input(s): POCGLU, POCNA, POCK, POCCL, POCBUN, POCHEMO, POCHCT in the last 72 hours.     Coags:   Lab Results   Component Value Date/Time    PROTIME 13.6 02/17/2023 10:12 AM    INR 1.05 02/17/2023 10:12 AM    APTT 30.8 07/13/2012 01:20 PM       HCG (If Applicable): No results found for: PREGTESTUR, PREGSERUM, HCG, HCGQUANT     ABGs: No results found for: PHART, PO2ART, OGT1ZHT, JDV5OKF, BEART, V7KYORYN     Type & Screen (If Applicable):  Lab Results   Component Value Date    LABABO A 02/21/2013    [address] Negative 07/13/2012       Drug/Infectious Status (If Applicable):  No results found for: HIV, HEPCAB    COVID-19 Screening (If Applicable): No results found for: COVID19        Anesthesia Evaluation  Patient summary reviewed and Nursing notes reviewed no history of anesthetic complications:   Airway: Mallampati: III  TM distance: >3 FB   Neck ROM: full  Mouth opening: > = 3 FB   Dental: normal exam         Pulmonary:normal exam    (+) sleep apnea:      (-) not a current smoker (never)                           Cardiovascular:  Exercise tolerance: good (>4 METS),   (+) hypertension:, valvular problems/murmurs (AVR/ mitral repair 2012): AI, pacemaker:, dysrhythmias (2nd degree av block for pacemaker  had pauses  on holter  ):,     (-) past MI,  angina, orthopnea and PND    NYHA Classification: II  ECG reviewed  Rhythm: regular  Rate: normal  Echocardiogram reviewed         Beta Blocker:  Not on Beta Blocker      ROS comment: Results for orders placed or performed during the hospital encounter of 01/09/19  -ECHO Complete 2D W Doppler W Color:        Result                      Value             Ref Range           Left Ventricular Eject*     50                                    LVEF MODALITY               ECHO                                  Neuro/Psych:   (+) neuromuscular disease:, TIA,             GI/Hepatic/Renal:        (-) GERD      ROS comment: Precancerous lesion colon  . Endo/Other: Negative Endo/Other ROS                    Abdominal:             Vascular: Other Findings:       Conclusions      Summary   Left ventricular cavity size is normal. There is slight asymmetric   hypertrophy of the basal septum. Left ventricular function is normal with   ejection fraction estimated at 55-60%. Normal diastolic function. Mitral valve repair noted. Trivial aortic regurgitation is present. The right ventricle is mildly enlarged. Right ventricular systolic function   visually appears normal.   Estimated pulmonary artery systolic pressure is at 33 mmHg assuming a right   atrial pressure of 3 mmHg.       Signature      ------------------------------------------------------------------ Electronically signed by Harish Wild MD (Interpreting   physician) on 08/13/2021 at 10:12 AM   ------------------------------------------------------------------         Anesthesia Plan      general     ASA 3       Induction: intravenous. MIPS: Prophylactic antiemetics administered. Anesthetic plan and risks discussed with patient and spouse. Plan discussed with CRNA.     Attending anesthesiologist reviewed and agrees with Angela Ren MD   3/13/2023

## 2023-03-13 NOTE — BRIEF OP NOTE
Brief Postoperative Note      Patient: Ashwin Zuñiga  YOB: 1940  MRN: [id number]    Date of Procedure: 3/13/2023    Pre-Op Diagnosis: Rectal mass [K62.89]    Post-Op Diagnosis: Same       Procedure(s):  ROBOTIC LOW ANTERIOR RESECTION    Surgeon(s):  MD Gabriela Nettles MD    Assistant:  Surgical Assistant: Michelle Hahn  Resident: Lilian Rosario DO    Anesthesia: General    Estimated Blood Loss (mL): less than 50     Complications: None    Specimens:   ID Type Source Tests Collected by Time Destination   A : DISTAL RING Tissue Tissue SURGICAL PATHOLOGY Gabriela Bassett MD 3/13/2023 9089    B : LOWER ANTERIOR RESECTION Tissue Tissue SURGICAL PATHOLOGY Gabriela Bassett MD 3/13/2023 3789        Implants:  * No implants in log *      Drains:   NG/OG/NJ/NE Tube Orogastric 16 fr Center mouth (Active)       Urinary Catheter 03/13/23 Mendoza (Active)       Findings: diffuse severe diverticulosis; lesion in upper rectum; LAR with colorectal anastomosis; neg leak test    Electronically signed by Gabriela Bassett MD on 3/13/2023 at 10:38 AM

## 2023-03-13 NOTE — ANESTHESIA POSTPROCEDURE EVALUATION
Department of Anesthesiology  Postprocedure Note    Patient: Rheba Essex  MRN: [id number]  YOB: 1940  Date of evaluation: 3/13/2023      Procedure Summary     Date: 03/13/23 Room / Location: [address] [place] [address]    Anesthesia Start: 0715 Anesthesia Stop: 5577    Procedure: ROBOTIC LOW ANTERIOR RESECTION Diagnosis:       Rectal mass      (Rectal mass [K62.89])    Surgeons: Ashley Gambino MD Responsible Provider: Maryann Palacios MD    Anesthesia Type: General ASA Status: 3          Anesthesia Type: General    Glo Phase I: Glo Score: 8    Glo Phase II:        Anesthesia Post Evaluation    Patient location during evaluation: PACU  Level of consciousness: awake  Complications: no  Multimodal analgesia pain management approach

## 2023-03-13 NOTE — PROGRESS NOTES
Pt A&Ox4 VSS. Lap sites CDI. Mendoza draining with yamila urine noted. Pt OOB this shift tolerating well. Pt tolerating PO intake. Pt in bed with wheels locked and in the lowest position with call light and personal belongings in reach and bed alarm set.      Electronically signed by Neeru Urbano RN on 3/13/2023 at 7:01 PM

## 2023-03-14 PROBLEM — D36.9 TUBULOVILLOUS ADENOMA: Status: ACTIVE | Noted: 2023-03-14

## 2023-03-14 LAB
ALBUMIN SERPL-MCNC: 3.9 G/DL (ref 3.4–5)
ANION GAP SERPL CALCULATED.3IONS-SCNC: 9 MMOL/L (ref 3–16)
BASOPHILS # BLD: 0 K/UL (ref 0–0.2)
BASOPHILS NFR BLD: 0.1 %
BUN SERPL-MCNC: 15 MG/DL (ref 7–20)
CALCIUM SERPL-MCNC: 8.9 MG/DL (ref 8.3–10.6)
CHLORIDE SERPL-SCNC: 96 MMOL/L (ref 99–110)
CO2 SERPL-SCNC: 25 MMOL/L (ref 21–32)
CREAT SERPL-MCNC: 1 MG/DL (ref 0.8–1.3)
DEPRECATED RDW RBC AUTO: 15.1 % (ref 12.4–15.4)
EOSINOPHIL # BLD: 0 K/UL (ref 0–0.6)
EOSINOPHIL NFR BLD: 0 %
GFR SERPLBLD CREATININE-BSD FMLA CKD-EPI: >60 ML/MIN/{1.73_M2}
GLUCOSE SERPL-MCNC: 94 MG/DL (ref 70–99)
HCT VFR BLD AUTO: 41.2 % (ref 40.5–52.5)
HGB BLD-MCNC: 13.4 G/DL (ref 13.5–17.5)
LYMPHOCYTES # BLD: 0.8 K/UL (ref 1–5.1)
LYMPHOCYTES NFR BLD: 7.8 %
MCH RBC QN AUTO: 29.3 PG (ref 26–34)
MCHC RBC AUTO-ENTMCNC: 32.5 G/DL (ref 31–36)
MCV RBC AUTO: 90.3 FL (ref 80–100)
MONOCYTES # BLD: 1.3 K/UL (ref 0–1.3)
MONOCYTES NFR BLD: 12.1 %
NEUTROPHILS # BLD: 8.5 K/UL (ref 1.7–7.7)
NEUTROPHILS NFR BLD: 80 %
PHOSPHATE SERPL-MCNC: 2.1 MG/DL (ref 2.5–4.9)
PLATELET # BLD AUTO: 235 K/UL (ref 135–450)
PMV BLD AUTO: 7.3 FL (ref 5–10.5)
POTASSIUM SERPL-SCNC: 4.1 MMOL/L (ref 3.5–5.1)
RBC # BLD AUTO: 4.57 M/UL (ref 4.2–5.9)
SODIUM SERPL-SCNC: 130 MMOL/L (ref 136–145)
WBC # BLD AUTO: 10.6 K/UL (ref 4–11)

## 2023-03-14 PROCEDURE — 6360000002 HC RX W HCPCS

## 2023-03-14 PROCEDURE — 2580000003 HC RX 258

## 2023-03-14 PROCEDURE — 1200000000 HC SEMI PRIVATE

## 2023-03-14 PROCEDURE — 6370000000 HC RX 637 (ALT 250 FOR IP): Performed by: NURSE PRACTITIONER

## 2023-03-14 PROCEDURE — 99024 POSTOP FOLLOW-UP VISIT: CPT | Performed by: SURGERY

## 2023-03-14 PROCEDURE — 6370000000 HC RX 637 (ALT 250 FOR IP): Performed by: STUDENT IN AN ORGANIZED HEALTH CARE EDUCATION/TRAINING PROGRAM

## 2023-03-14 PROCEDURE — 85025 COMPLETE CBC W/AUTO DIFF WBC: CPT

## 2023-03-14 PROCEDURE — 36415 COLL VENOUS BLD VENIPUNCTURE: CPT

## 2023-03-14 PROCEDURE — 80069 RENAL FUNCTION PANEL: CPT

## 2023-03-14 PROCEDURE — 94150 VITAL CAPACITY TEST: CPT

## 2023-03-14 RX ORDER — TAMSULOSIN HYDROCHLORIDE 0.4 MG/1
0.8 CAPSULE ORAL DAILY
Status: DISCONTINUED | OUTPATIENT
Start: 2023-03-14 | End: 2023-03-15 | Stop reason: HOSPADM

## 2023-03-14 RX ADMIN — TAMSULOSIN HYDROCHLORIDE 0.8 MG: 0.4 CAPSULE ORAL at 08:16

## 2023-03-14 RX ADMIN — ACETAMINOPHEN 1000 MG: 500 TABLET ORAL at 11:28

## 2023-03-14 RX ADMIN — ACETAMINOPHEN 1000 MG: 500 TABLET ORAL at 17:47

## 2023-03-14 RX ADMIN — SODIUM CHLORIDE, PRESERVATIVE FREE 10 ML: 5 INJECTION INTRAVENOUS at 23:44

## 2023-03-14 RX ADMIN — SODIUM CHLORIDE, PRESERVATIVE FREE 10 ML: 5 INJECTION INTRAVENOUS at 08:16

## 2023-03-14 RX ADMIN — ACETAMINOPHEN 1000 MG: 500 TABLET ORAL at 23:43

## 2023-03-14 RX ADMIN — ACETAMINOPHEN 1000 MG: 500 TABLET ORAL at 04:38

## 2023-03-14 RX ADMIN — ENOXAPARIN SODIUM 40 MG: 100 INJECTION SUBCUTANEOUS at 08:16

## 2023-03-14 ASSESSMENT — PAIN DESCRIPTION - FREQUENCY
FREQUENCY: CONTINUOUS

## 2023-03-14 ASSESSMENT — PAIN DESCRIPTION - LOCATION
LOCATION: ABDOMEN

## 2023-03-14 ASSESSMENT — PAIN SCALES - GENERAL
PAINLEVEL_OUTOF10: 3
PAINLEVEL_OUTOF10: 3
PAINLEVEL_OUTOF10: 2
PAINLEVEL_OUTOF10: 3

## 2023-03-14 ASSESSMENT — PAIN DESCRIPTION - ORIENTATION
ORIENTATION: MID

## 2023-03-14 ASSESSMENT — PAIN DESCRIPTION - PAIN TYPE
TYPE: SURGICAL PAIN

## 2023-03-14 ASSESSMENT — PAIN DESCRIPTION - ONSET
ONSET: ON-GOING

## 2023-03-14 ASSESSMENT — PAIN DESCRIPTION - DESCRIPTORS
DESCRIPTORS: ACHING

## 2023-03-14 ASSESSMENT — PAIN - FUNCTIONAL ASSESSMENT: PAIN_FUNCTIONAL_ASSESSMENT: ACTIVITIES ARE NOT PREVENTED

## 2023-03-14 NOTE — PROGRESS NOTES
Pt alert and oriented. VSS. Pt v-paced on tele. Pt reporting minimal pain, denies need for scheduled Tylenol. Pt using ice pack intermittently on abdomen. Pt has hernandez in place with adequate urine output. Pt has CPAP at night. Pt resting comfortably in bed. Pt has call light within reach, bed in lowest position with wheels locked, 2/4 side rails up, and bed alarm on.

## 2023-03-14 NOTE — PLAN OF CARE
Problem: Discharge Planning  Goal: Discharge to home or other facility with appropriate resources  Outcome: Progressing     Problem: Pain  Goal: Verbalizes/displays adequate comfort level or baseline comfort level  Outcome: Progressing  Flowsheets (Taken 3/14/2023 1530)  Verbalizes/displays adequate comfort level or baseline comfort level:   Encourage patient to monitor pain and request assistance   Assess pain using appropriate pain scale   Administer analgesics based on type and severity of pain and evaluate response     Problem: Safety - Adult  Goal: Free from fall injury  Outcome: Adequate for Discharge  Flowsheets (Taken 3/14/2023 1533)  Free From Fall Injury: Instruct family/caregiver on patient safety

## 2023-03-14 NOTE — CARE COORDINATION
5:05 PM  Upon review of pts chart, pt is from home, IPTA, no current home services. Pt denies a need for any. Pt has transport at time of dc. CM will sign off at this time. Please consult CM/SW if any dcp needs arise.     Electronically signed by Vivian Landin RN, CM on 3/14/2023 at 5:05 PM.  Phone: [phone]  Fax: [phone]

## 2023-03-14 NOTE — PROGRESS NOTES
Patient is alert and oriented. Vitals stable. Surgical abdominal pain controlled with routine tylenol. Patient up with standby assist in halls to ambulate. Tolerating regular diet. Patient with 2 loose bm, with blood. Patient started complaining of right calf pain that radiates up thigh, discussed with ANUM, He came to evaluate. ST boots on. Lovenox given. Patient is voiding on own since hernandez removed. Up to chair, wife visiting. Will monitor.

## 2023-03-14 NOTE — OP NOTE
[facility] [address], [address]                                OPERATIVE REPORT    PATIENT NAME: Cortes Dillard                     :        1940  MED REC NO:   [id number]                          ROOM:       [id number]  ACCOUNT NO:   [de-identified]                           ADMIT DATE: 2023  PROVIDER:     David Gipson. Hugh Ramirez MD    DATE OF PROCEDURE:  2023    SURGEON:  David Gipson. 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 POSTOPERATIVE DIAGNOSIS:  Adenomatous mass of the upper rectum. PROCEDURE:  Robotic-assisted laparoscopic low anterior resection  colorectal anastomosis. ANESTHESIA:  General endotracheal.    COMPLICATIONS:  None. SPECIMEN:  LAR.    ESTIMATED BLOOD LOSS:  Less than 50 mL. INDICATIONS:  The patient is an 47-year-old gentleman. He originally  underwent colonoscopy several months ago who was found to have a large  polypoid mass of the rectosigmoid versus upper rectum. This was marked  with a tattoo distally. Biopsy showed adenomatous tissue. He was  referred to me for further evaluation. I recommended we proceed with  surgical excision of this area of the colon. This was scheduled  originally for early 2023. The patient was actually in the  preoperative area and then had second thoughts about his cardiac  clearance. He wanted to seek an opinion from another cardiologist, so  he went ahead and did this. He then came back to me for surgery in mid  2023 after he had undergone cardiac clearance and pacer placement. I  discussed with him and his wife the procedure, including the risk of  bleeding, infection, anastomotic leak, hernia, need for additional  operations, potential damage to intraabdominal structures including, but  not limited to bowel, bladder, ureters as well as neurovascular  structures.   He understood high potential need for open operation and  potential ostomy. He understood he is at high risk for complications  given his aspirin use, his older age, and previous mesh in the left  inguinal region, as well as his severe diverticulosis. PROCEDURE DETAILS:  The patient was brought to the operating theater,  placed supine on the operating table. General endotracheal intubation  was performed by Anesthesiology. The patient was placed in the  lithotomy position. Mendoza catheter was placed revealing clear yellow  urine. His rectum was washed out using Betadine and saline solution. His abdomen was prepped and draped in the usual sterile fashion using  chlorhexidine prep solution. Timeout was performed confirming the  patient's identity as well as the operative site. Antibiotics were  confirmed to be perfusing. All safety points were followed. SCDs were  on and functioning. Lovenox was confirmed given. I began by making an incision in the left upper quadrant midclavicular  line at Messina's point. A 0-degree 5-mm laparoscope was used to enter  the abdominal cavity in direct Optiview fashion. The abdomen was  entered without complication, insufflated to 15 mmHg. Additional ports  had been placed including a 12 mm stapler port in the right lower  quadrant. A right mid abdomen 8-mm port, a midline 8-mm upper abdominal  port, and the original 5-mm port was then upgraded to an 8 mm robotic  port. A 5-mm assistant port was placed in the right upper quadrant as  well. The patient was placed in head down, left-side up positioning. Small bowel was then brought out of the pelvis. The [employer] robot  was then docked in the usual fashion. A second timeout was performed. I started by noting that the proximal sigmoid colon as well as the mid  and distal sigmoid colon had diffuse diverticulosis and the mid sigmoid  colon was actually stuck to what appeared to be the mesh from his  previous hernia operation.   I sharply took this down and noted sharp  tacks as well as an older style mesh in the intraperitoneal space. The  colon was taken down from the mesh and there did not appear to be any  damage to the colon itself, though it was fairly tight in some areas. I  then started lateral to medial  the colon off the white line  of Toldt. I entered the retroperitoneal space. I noted the ureter,  which was then pushed well out of harm's way. Bluntly, I continued the  peritoneal incision to the upper rectum on the left side. I then went  medial to lateral and made a peritoneal incision underneath the MAY  pedicle. I started in the posterior total mesorectal space, continued  down to the mid rectum. I then came up laterally and finally  anteriorly. I noted the tattoo in the mid rectum right about at the  area of the anterior peritoneal reflection. I made sure to dissect  below this so that we would have good healthy margins. I noted that the  entirety of the colon was then freed up with the exception of the MAY  pedicle. I again ensured that ureter was well out of harm's way. I  continued _____ proximally in the proximal sigmoid and descending colon  off Gerota's fascia to ensure we would have good amount of colon for  anastomosis. I then performed a flexible sigmoidoscopy. I noted the area of the  polyp and noted intraabdominal areas that I would propose for a  transection of at least 5 cm on either side of the polypoid mass. It  turned out that the proximal transection site was in the mid to distal  sigmoid colon. I circumferentially freed around this area and I was  able to save the MAY pedicle, but I did take the superior rectal artery  using the vessel sealer device and continued the mesenteric dissection  towards the area of the colon. I then used a 6 mm blue robotic staple  load to transect the proximal colon.   In the distal  transection side, I  also chose an area about 5 cm distal to the area of the polypoid mass.   This was just past the anterior peritoneal reflection.  I  circumferentially dissected around this area and the vessel sealer was  then used to take down the mesorectum in this area.  An additional 6 mm  blue staple load was used to transect the mid rectum.  The specimen was  then brought out of the pelvis.  The pelvis was then irrigated and  suctioned, noted to be hemostatic.  I used ICG angiography to confirm  that there was good blood flow to the proximal and distal segments.  I  noted there would be no tension whatsoever, so additional mobilization  would not be necessary.    I then made an extraction incision extending the epigastric port site to  about 2 inches in length total.  I dissected through subcutaneous tissue  down to the fascia.  Fascia was incised in vertical fashion.  A medium  sized wound protector was then placed.  The specimen was then brought  out of the abdomen, passed off to the back table.  I opened it and  inspected and noted the polypoid mass to be right in the middle of the  specimen with good gross margins on both sides.  There did not appear to  be any gross areas of invasion.  This was sent for permanent path evaluation.    I then scrubbed back into the case and the proximal colon segment was  then brought through the wound protector site.  Given the diffuse  numerous diverticulosis, I decided to go ahead and plan for a side colon  to end-rectum anastomosis.  The staple line was excised and the green  spike on the EEA 29 anvil was then past about 4 to 5 cm from the end of  the colotomy and poked through the anterior mesenteric border.  I used  3-0 Vicryls to pursestring around this area.  The open colotomy was then  closed using a DEA-75 blue staple load.  I then dissected around this  area.  I noted again numerous diverticulum that required dissection away  from the colonic wall and then these were suture ligated into the post  in order to be incorporated in the inner ring of  the anastomosis so that  they would not be incorporated into the anastomosis itself. Once I was happy with this, the colon was then placed back in abdominal  cavity. The wound protector top was then placed. The patient was  placed back in head down left sided positioning. The small bowel was  again brought out of the pelvis. I went down below and performed  flexible sigmoidoscopy and noted a completely intact rectal stump,  though there was large stool bowel from his diverticulosis that  remained. I was able to digitally remove them. There was no leak noted  from the rectal stump. EEA stapler was then advanced and the spike was  deployed just adjacent to the staple line. The two ends of the stapler  were then met together and secured down to, ensured there was no  twisting and the end of the colon staple line laid posteriorly and the  anterior mesenteric border laid anteriorly so that there was no twist in  the mesentery whatsoever. The stapler was secured down prior to  creating a stapled end-to-end 21 mm side-to-end colorectal anastomosis. Staple was then removed noting intact anastomotic ring x2. The distal  ring will be sent labeled as distal ring for permanent evaluation. I  performed a flexible sigmoidoscopy for a leak test.  There was no leak  or bubbling noted whatsoever of saline in the pelvis. Anastomosis  intraluminally appeared to be well perfused and completely formed  circumferentially. I was happy with how this looked. The colonoscope  was then removed, the colon was desufflated. The irrigant fluid was  then suctioned out of the abdomen. We were satisfied with how everything went, so we started the closing  process. The wound protector site was then removed. The fascia of the  extraction site was then closed using 0 PDS in a running fashion. The  12-mm port was removed and an 0 Vicryl suture passer was then used to  close the fascia.   The remaining ports were then removed under direct  visualization. No bleeding noted at the abdominal wall. The abdomen  was completely desufflated. All the incisions were then irrigated and  closed using combination of 3-0 Vicryl, 4-0 Monocryl and skin glue. The patient tolerated the procedure well. He was then extubated and  brought to PACU after undergoing TAP block by Anesthesia. The patient's  family was updated on the operative findings.         Olga Reinoso MD    D: 03/13/2023 10:48:26       T: 03/13/2023 19:59:02     [email or web address]  Job#: [id number]     Doc#: [id number]

## 2023-03-14 NOTE — PROGRESS NOTES
03/14/23 1534   Encounter Summary   Encounter Overview/Reason  Rituals, Rites and Sacraments   Service Provided For: Patient and family together   Referral/Consult From: Rounding   Support System Spouse   Last Encounter  03/14/23  (sos,  cm)   Complexity of Encounter Moderate   Begin Time 1520   End Time  1530   Total Time Calculated 10 min   Encounter    Type Initial Screen/Assessment   Spiritual/Emotional needs   Type Spiritual Support   Rituals, Rites and Sacraments   Type Sacrament of Sick; Anointing   Grief, Loss, and Adjustments   Type Adjustment to illness   Assessment/Intervention/Outcome   Assessment Calm;Coping; Hopeful   Intervention Active listening;Explored Coping Skills/Resources; Explored/Affirmed feelings, thoughts, concerns   Outcome Coping;Expressed Gratitude   Electronically signed by Harvey Middleton on 3/14/2023 at 3:37 PM

## 2023-03-14 NOTE — PROGRESS NOTES
Colorectal Surgery   Daily Progress Note  Patient: Dallas Allen      CC: rectal mass    SUBJECTIVE:   Patient rested well overnight. Pain is controlled. Tolerating diet without nausea or vomiting. Voiding appropriately without issues via Mendoza. Passing gas but no BM.    ROS:   A 14 point review of systems was conducted, significant findings as noted above. All other systems negative. OBJECTIVE:    PHYSICAL EXAM:    Vitals:    03/13/23 1615 03/13/23 1951 03/13/23 2328 03/14/23 0332   BP: 133/79 110/67 120/66 (!) 131/47   Pulse: 71 73 63 78   Resp:  18 18 18   Temp: 97.6 °F (36.4 °C) 98.2 °F (36.8 °C) 98.1 °F (36.7 °C) 97.9 °F (36.6 °C)   TempSrc: Oral Oral Oral Oral   SpO2: 96% 93% 94% 94%   Weight:       Height:           General appearance: Alert, no acute distress, grooming appropriate  Eyes: No scleral icterus, EOM grossly intact  Neck: Trachea midline, no JVD, no lymphadenopathy, neck supple  Chest/Lungs: Normal effort with no accessory muscle use on RA  Cardiovascular: RRR, well-perfused  Abdomen: Soft, appropriately-tender, incisions c/d/i and well approximated with Dermabond with minimal surrounding ecchymosis  Skin: Warm and dry, no rashes  Extremities: No edema, no cyanosis  Genitourinary: Mendoza in place with clear yellow urine  Neuro: A&Ox3, no focal deficits, sensation intact    LABS:   Recent Labs     03/13/23  0719   WBC 5.6   HGB 15.0   HCT 44.1   MCV 89.0           Recent Labs     03/13/23  0719   *   K 4.0   CL 94*   CO2 26   BUN 14   CREATININE 1.0      No results for input(s): AST, ALT, ALB, BILIDIR, BILITOT, ALKPHOS in the last 72 hours. No results for input(s): LIPASE, AMYLASE in the last 72 hours. No results for input(s): PROT, INR, APTT in the last 72 hours. No results for input(s): CKTOTAL, CKMB, CKMBINDEX, TROPONINI in the last 72 hours.       ASSESSMENT & PLAN:   This is a 78 y.o. male with a diagnosis of rectal mass s/p robotic LAR (03/14) POD#1.    - Continue regular diet  - Restart home meds including Flomax, Avodart and Crestor  - Remove Mendoza this AM. Void check later today  - Encourage OOB, ambulation, IS, deep breathing  - Continue DVT ppx  - Dispo pending void trial      Marisol Aburto DO, MSMEd  PGY1, General Surgery  03/14/23  6:21 AM  [employer]  Pager: [phone]

## 2023-03-15 VITALS
OXYGEN SATURATION: 96 % | HEIGHT: 63 IN | TEMPERATURE: 97.8 F | DIASTOLIC BLOOD PRESSURE: 84 MMHG | RESPIRATION RATE: 16 BRPM | HEART RATE: 70 BPM | WEIGHT: 167.4 LBS | BODY MASS INDEX: 29.66 KG/M2 | SYSTOLIC BLOOD PRESSURE: 147 MMHG

## 2023-03-15 PROCEDURE — 6370000000 HC RX 637 (ALT 250 FOR IP): Performed by: NURSE PRACTITIONER

## 2023-03-15 PROCEDURE — 6360000002 HC RX W HCPCS

## 2023-03-15 PROCEDURE — 2580000003 HC RX 258

## 2023-03-15 PROCEDURE — 99024 POSTOP FOLLOW-UP VISIT: CPT | Performed by: SURGERY

## 2023-03-15 PROCEDURE — 6370000000 HC RX 637 (ALT 250 FOR IP): Performed by: STUDENT IN AN ORGANIZED HEALTH CARE EDUCATION/TRAINING PROGRAM

## 2023-03-15 RX ORDER — TRAMADOL HYDROCHLORIDE 50 MG/1
50 TABLET ORAL EVERY 4 HOURS PRN
Qty: 6 TABLET | Refills: 0 | Status: SHIPPED | OUTPATIENT
Start: 2023-03-15 | End: 2023-03-16

## 2023-03-15 RX ADMIN — ACETAMINOPHEN 1000 MG: 500 TABLET ORAL at 05:39

## 2023-03-15 RX ADMIN — ACETAMINOPHEN 1000 MG: 500 TABLET ORAL at 11:28

## 2023-03-15 RX ADMIN — TAMSULOSIN HYDROCHLORIDE 0.8 MG: 0.4 CAPSULE ORAL at 07:37

## 2023-03-15 RX ADMIN — SODIUM CHLORIDE, PRESERVATIVE FREE 10 ML: 5 INJECTION INTRAVENOUS at 07:37

## 2023-03-15 RX ADMIN — ENOXAPARIN SODIUM 40 MG: 100 INJECTION SUBCUTANEOUS at 07:37

## 2023-03-15 ASSESSMENT — PAIN - FUNCTIONAL ASSESSMENT: PAIN_FUNCTIONAL_ASSESSMENT: ACTIVITIES ARE NOT PREVENTED

## 2023-03-15 ASSESSMENT — PAIN DESCRIPTION - LOCATION
LOCATION: ABDOMEN
LOCATION: ABDOMEN

## 2023-03-15 ASSESSMENT — PAIN DESCRIPTION - ONSET: ONSET: ON-GOING

## 2023-03-15 ASSESSMENT — PAIN DESCRIPTION - DESCRIPTORS
DESCRIPTORS: ACHING
DESCRIPTORS: ACHING

## 2023-03-15 ASSESSMENT — PAIN SCALES - GENERAL
PAINLEVEL_OUTOF10: 3
PAINLEVEL_OUTOF10: 2
PAINLEVEL_OUTOF10: 0

## 2023-03-15 ASSESSMENT — PAIN DESCRIPTION - ORIENTATION
ORIENTATION: MID
ORIENTATION: MID

## 2023-03-15 ASSESSMENT — PAIN DESCRIPTION - FREQUENCY: FREQUENCY: CONTINUOUS

## 2023-03-15 ASSESSMENT — PAIN DESCRIPTION - PAIN TYPE: TYPE: SURGICAL PAIN

## 2023-03-15 NOTE — PROGRESS NOTES
Colorectal Surgery   Daily Progress Note  Patient: Sascha Varma      CC: rectal mass    SUBJECTIVE:   Patient resting well overnight. Hypertensive other AVSS. Patient states that pain is well controlled with scheduled tylenol. Patient is tolerating diet. Passing gas and having BM. Voiding freely    ROS:   A 14 point review of systems was conducted, significant findings as noted above. All other systems negative. OBJECTIVE:    PHYSICAL EXAM:    Vitals:    03/14/23 1523 03/14/23 1933 03/14/23 2342 03/15/23 0318   BP: 129/72 135/82 (!) 161/85 (!) 145/85   Pulse: 63 75 77 59   Resp: 14 16 16 16   Temp: 98.2 °F (36.8 °C) 98.1 °F (36.7 °C) 97.9 °F (36.6 °C) 97.7 °F (36.5 °C)   TempSrc: Oral Oral Oral Oral   SpO2: 93% 95% 92% 96%   Weight:       Height:           General appearance: Alert, no acute distress, grooming appropriate  Eyes: No scleral icterus, EOM grossly intact  Neck: Trachea midline, no JVD, no lymphadenopathy, neck supple  Chest/Lungs: Normal effort with no accessory muscle use on RA  Cardiovascular: RRR, well-perfused  Abdomen: Soft, appropriately-tender, incisions c/d/i and well approximated with Dermabond with surrounding ecchymosis mid-abdomen and right jeff-umbilical area  Skin: Warm and dry, no rashes  Extremities: No edema, no cyanosis  Genitourinary: Grossly normal  Neuro: A&Ox3, no focal deficits, sensation intact    LABS:   Recent Labs     03/13/23  0719 03/14/23  1244   WBC 5.6 10.6   HGB 15.0 13.4*   HCT 44.1 41.2   MCV 89.0 90.3    235          Recent Labs     03/13/23  0719 03/14/23  1244   * 130*   K 4.0 4.1   CL 94* 96*   CO2 26 25   PHOS  --  2.1*   BUN 14 15   CREATININE 1.0 1.0        No results for input(s): AST, ALT, ALB, BILIDIR, BILITOT, ALKPHOS in the last 72 hours. No results for input(s): LIPASE, AMYLASE in the last 72 hours. No results for input(s): PROT, INR, APTT in the last 72 hours.    No results for input(s): CKTOTAL, CKMB, CKMBINDEX, TROPONINI in the last 72 hours.       ASSESSMENT & PLAN:   This is a 78 y.o. male with a diagnosis of rectal mass s/p robotic LAR (03/13) POD#2.    - Continue regular diet  - Restart home meds including Flomax, Avodart and Crestor  - Encourage OOB, ambulation, IS, deep breathing  - Continue DVT ppx  -Discharge later today      MAHAMED RandolphM  Podiatric Resident PGY-1

## 2023-03-15 NOTE — PROGRESS NOTES
Pt alert and oriented. VSS. Pt V paced on tele. Pt voiding freely. Pt sat up in chair for part of shift. Pt has reported minimal pain that is being controlled with scheduled Tylenol, see MAR. Pt tolerating diet. Pt passing gas and having BMs. Pt has call light within reach, bed in lowest position with wheels locked, 2/4 side rails up, and bed alarm on.

## 2023-03-15 NOTE — DISCHARGE INSTRUCTIONS
Diet:   May resume regular diet    Wound Care:   Surgical grade glue was used on your incision, this will aid in the healing of your incision. It will peel off on its own within 10 days. If it does not peel off in 10 days, you may use petroleum jelly to gently remove it. Do not soak or scrub area of incision for 7-10 days. Activity:   No heavy lifting greater than a milk jug, or 8 lbs until follow up. Pain management: For moderate to severe pain please take the Roxicodone that you were prescribed. If you take narcotics, note that they may cause constipation. For constipation take Colace up to two times a day as needed. If stools become loose, you may reduce the amount of colace you are taking or stop taking all together. Take as little narcotic pain medication as you can tolerate. No driving while on narcotics. For mild to moderate pain you may take over-the-counter Tylenol or Motrin as directed on the packaging. Return if:   Call/ Return to ED for increased redness, worsening pain, drainage from wound, or fevers greater than 101.5 F. Follow up with Dr. Oskar Oconnor in 2 week(s). Please call the office to make an appointment.  [phone]

## 2023-03-16 ENCOUNTER — CARE COORDINATION (OUTPATIENT)
Dept: CASE MANAGEMENT | Age: 83
End: 2023-03-16

## 2023-03-16 DIAGNOSIS — K62.89 RECTAL MASS: Primary | ICD-10-CM

## 2023-03-16 PROCEDURE — 1111F DSCHRG MED/CURRENT MED MERGE: CPT | Performed by: FAMILY MEDICINE

## 2023-03-16 NOTE — CARE COORDINATION
[address] Care Transitions Initial Follow Up Call    Call within 2 business days of discharge: Yes    Patient Current Location:  Home: [address] [place] [address],[address] [id number]    Care Transition Nurse contacted the family by telephone to perform post hospital discharge assessment. Verified name and  with family as identifiers. Provided introduction to self, and explanation of the Care Transition Nurse role. Patient: Mel Beck Patient : 1940   MRN: [id number]   Reason for Admission: Rectal Mass, S/P Robotic Lap  Discharge Date: 3/15/23 RARS: Readmission Risk Score: 8.5      Last Discharge  [address]       Date Complaint Diagnosis Description Type Department Provider    3/13/23  Acute post-operative pain . .. Admission (Discharged) [address] Delia Dennis MD            Was this an external facility discharge? No     Challenges to be reviewed by the provider   Additional needs identified to be addressed with provider: Yes  none               Method of communication with provider: none. CTN spoke with patients spouse this am for initial 24 hour discharge follow up CTN call. Spouse states patient is doing well, no reports of any fever, chills, nausea, vomiting, chest pain, SOB or cough. Patient with no congestion, difficulty emptying bladder, LE edema, feeling lightheaded, dizziness, and heart palpitations. Spouse states patient is having minimal pain, taking excedrin XS when pain present. Incisions healing well, no constipation or problems with appetite. CTN and patient's family member reviewed meds and CTN completed the 1111f. Care Transition Nurse reviewed discharge instructions, medical action plan, and red flags with family who verbalized understanding. The family was given an opportunity to ask questions and does not have any further questions or concerns at this time. Were discharge instructions available to patient? Yes.  Reviewed appropriate site of care based on symptoms and resources available to patient including: PCP  Specialist  Urgent care clinics  When to call 911. The family agrees to contact the PCP office for questions related to their healthcare.     Advance Care Planning:   Does patient have an Advance Directive: reviewed and current.    Medication reconciliation was performed with family, who verbalizes understanding of administration of home medications. Medications reviewed, 1111F entered: yes    Was patient discharged with a pulse oximeter? no    Non-face-to-face services provided:  Obtained and reviewed discharge summary and/or continuity of care documents  Education of patient/family/caregiver/guardian to support self-management-Spouse instructed to continue to monitor for any of the above noted s/s, reporting any that may present to MD immediately, continue to have patient rest as needed, monitoring incision sites for any signs of infection.  Assessment and support for treatment adherence and medication management-Medication Review Completed with Spouse.    Offered patient enrollment in the Remote Patient Monitoring (RPM) program for in-home monitoring: Patient is not eligible for RPM program.    Care Transitions 24 Hour Call    Schedule Follow Up Appointment with PCP: Declined  Do you have a copy of your discharge instructions?: Yes  Do you have all of your prescriptions and are they filled?: Yes  Have you been contacted by a [facility] Pharmacist?: No  Have you scheduled your follow up appointment?: Yes  How are you going to get to your appointment?: Car - family or friend to transport  Do you have support at home?: Partner/Spouse/SO  Do you feel like you have everything you need to keep you well at home?: Yes  Are you an active caregiver in your home?: No  Care Transitions Interventions  No Identified Needs         Discussed follow-up appointments. If no appointment was previously scheduled, appointment scheduling offered: Yes.   Is follow up appointment scheduled  within 7 days of discharge? Yes. Follow Up  Future Appointments   Date Time Provider Alta Massey   3/28/2023  9:00 AM Sheldon Berg MD [facility]   4/12/2023  7:00 AM SCHEDULE, Thalia Patterson REMOTE TRANSMISSION [place] [facility]   5/17/2023  2:30 PM SCHEDULE, Sebastian Mcgovern [address] [facility]   5/17/2023  3:00 PM Jesu Daley APRN - CNP [facility]       Care Transition Nurse provided contact information. Plan for follow-up call in 5-7 days based on severity of symptoms and risk factors. Plan for next call: symptom management-Any worsening pain, any presenting post op complications.     Thank Filiberto Cifuentes RN  Care Transition Coordinator  Contact [facility]:[phone]

## 2023-03-16 NOTE — CARE COORDINATION
[facility] Transitions Initial Follow Up Call    Call within 2 business days of discharge: Yes    Patient:  Neida Leone   Patient :  1940  MRN:  [id number]    Reason for Admission: Rectal Mass  Discharge Date:  3/15/23   RARS:       Transitions of Care Initial Call    Was this an external facility discharge? Discharge Facility: [address] [place] [address] to be reviewed by the provider   Additional needs identified to be addressed with provider:    no    AMB CC Provider Discharge Needs: none            Non-face-to-face services provided:    1ST CTC attempt to reach Pt regarding recent hospital discharge. CTC left voice recording with call back number requesting a call back.     Follow up appointments:    Future Appointments   Date Time Provider Alta Massey   3/28/2023  9:00 AM Kristin Brown MD [facility]   2023  7:00 AM SCHEDULE, Mikel Sloan REMOTE TRANSMISSION [place] [facility]   2023  2:30 PM SCHEDULE, Sebastian Mcgovern [address] [facility]   2023  3:00 PM Francois Payan, APRN - CNP [facility]       Thank Elian Maxwell RN  Care Transition Coordinator  Contact [facility]:[phone]

## 2023-03-17 NOTE — RESULT ENCOUNTER NOTE
Donna Damon is doing very well after his colectomy. 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

## 2023-03-18 NOTE — DISCHARGE SUMMARY
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    Diverticulosis of colon     Diverticulosis of large intestine 05/15/2010    Essential hypertension 01/17/2013    History of blood transfusion     History of GI bleed 04/2012    Hydronephrosis, left 07/02/2016    Hyperlipidemia     Left carpal tunnel syndrome 01/26/2022    Mitral valve disorder     MSSA (methicillin susceptible Staphylococcus aureus) septicemia ([facility] [address] 03/2012    Osteoarthritis     neck, back, knees    Partial small bowel obstruction (HCC)     Sleep apnea     Staph aureus infection 03/10/2012    blood    TIA (transient ischemic attack) 05/2012    Wears glasses     Wears hearing aid in both ears         Indication for Admission:   S/p robotic-assisted laparoscopic low anterior resection with colorectal anastomosis    Hospital Course:   Patient arrived to [facility] under undergo the above stated procedure. The patient's post-operative course was uncomplicated. Post-operatively, the patient was tolerating a regular diet and pain was well-controlled. The patient was voiding without issues and ambulating without issues.       Discharge physical exam:  General appearance: Alert, no acute distress, grooming appropriate  Eyes: No scleral icterus, EOM grossly intact  Neck: Trachea midline, no JVD, no lymphadenopathy, neck supple  Chest/Lungs: Normal effort with no accessory muscle use on RA  Cardiovascular: RRR, well-perfused  Abdomen: Soft, appropriately-tender, incisions c/d/i and well approximated with Dermabond with surrounding ecchymosis mid-abdomen and right jeff-umbilical area  Skin: Warm and dry, no rashes  Extremities: No edema, no cyanosis  Genitourinary: Grossly normal  Neuro: A&Ox3, no focal deficits, sensation intact    Disposition:    Home    Condition at discharge:  Stable    Discharge Instructions:  See separate form    Patient Instructions:      Medication List        CONTINUE taking these medications      aspirin 81 MG tablet     dutasteride 0.5 MG capsule  Commonly known as: AVODART  Take 1 capsule by mouth daily     Flaxseed (Linseed) 1000 MG Caps     METAMUCIL PO     multivitamin per tablet     rosuvastatin 10 MG tablet  Commonly known as: CRESTOR  TAKE 1 TABLET BY MOUTH EVERY DAY     tamsulosin 0.4 MG capsule  Commonly known as: FLOMAX  Take 2 capsules by mouth daily            STOP taking these medications      metroNIDAZOLE 500 MG tablet  Commonly known as: Flagyl     neomycin 500 MG tablet  Commonly known as: MYCIFRADIN     ondansetron 4 MG disintegrating tablet  Commonly known as: ZOFRAN-ODT            ASK your doctor about these medications      traMADol 50 MG tablet  Commonly known as: Ultram  Take 1 tablet by mouth every 4 hours as needed for Pain for up to 1 day. Intended supply: 3 days. Take lowest dose possible to manage pain Max Daily Amount: 300 mg  Ask about: Should I take this medication?               Where to Get Your Medications        You can get these medications from any pharmacy    Bring a paper prescription for each of these medications  traMADol 50 MG tablet         Sinai Pacheco DO  03/18/23  4:35 PM

## 2023-03-22 ENCOUNTER — CARE COORDINATION (OUTPATIENT)
Dept: CASE MANAGEMENT | Age: 83
End: 2023-03-22

## 2023-03-22 NOTE — CARE COORDINATION
[facility][1] Care Transitions Follow Up Call    Patient Current Location:  Home: [address] [address]    Care Transition Nurse contacted the family by telephone to follow up after admission on 03/15/2023. Verified name and  with family as identifiers. Patient: Chuy Lafleur  Patient : 1940   MRN: [id number]   Reason for Admission: Rectal Mass, S/P Robot Lap  Discharge Date: 3/15/23 RARS: Readmission Risk Score: 8.5      Needs to be reviewed by the provider   Additional needs identified to be addressed with provider: No  none             Method of communication with provider: none. CTN spoke with patients spouse this afternoon for follow up CTN call. Spouse states patient is doing well, no reports of any fever, chills, nausea, vomiting, chest pain, SOB or cough. Patient with no congestion, pain, difficulty emptying bladder, LE edema, feeling lightheaded, dizziness, and heart palpitations. Spouse reports patient does have some slight soreness at Robotic Puncture sites, but no PRN's needed, no drainage, increased swelling or redness. Addressed changes since last contact:  none  Discussed follow-up appointments. If no appointment was previously scheduled, appointment scheduling offered: Yes. Is follow up appointment scheduled within 7 days of discharge? Yes. Follow Up  Future Appointments   Date Time Provider Alta Massey   3/28/2023  9:00 AM Yandy Monae MD COLORECTAL S [facility]   2023  7:00 AM SCHEDULE, Jarvis Harford REMOTE TRANSMISSION Erika Clifton [facility]   2023  2:30 PM SCHEDULE, Sebastian Higgins [facility]   2023  3:00 PM ANUM Pinto - CNP [facility]     Care Transition Nurse reviewed red flags with family and discussed any barriers to care and/or understanding of plan of care after discharge.  Discussed appropriate site of care based on symptoms and resources available to patient including: PCP  Specialist  Urgent care clinics  When

## 2023-03-28 ENCOUNTER — OFFICE VISIT (OUTPATIENT)
Dept: SURGERY | Age: 83
End: 2023-03-28

## 2023-03-28 VITALS
OXYGEN SATURATION: 95 % | WEIGHT: 173 LBS | TEMPERATURE: 98.1 F | DIASTOLIC BLOOD PRESSURE: 83 MMHG | HEART RATE: 84 BPM | BODY MASS INDEX: 30.65 KG/M2 | SYSTOLIC BLOOD PRESSURE: 144 MMHG | HEIGHT: 63 IN

## 2023-03-28 DIAGNOSIS — D12.8 ADENOMATOUS RECTAL POLYP: Primary | ICD-10-CM

## 2023-03-28 PROCEDURE — 99024 POSTOP FOLLOW-UP VISIT: CPT | Performed by: SURGERY

## 2023-03-28 NOTE — PROGRESS NOTES
[address] [facility] COLORECTAL SURGERY  [address]  Dept: [phone]  Dept Fax: [phone]  Loc: [phone]    Visit Date: 3/28/2023    Ike Ambrosio is a 78 y.o. male who presents today for: Post-Op Check (Rectal mass  3-13-23)      Subjective:     Ike Ambrosio is a 78 y.o. male here for postoperative visit after robotic low anterior resection about 2 weeks ago. Overall doing very well. No complaints. Bowels working regularly. Tolerating regular diet. Patient's problem list, medications, past medical, surgical, family, and social histories were reviewed and updated in the chart as indicated today. Objective:     BP (!) 144/83   Pulse 84   Temp 98.1 °F (36.7 °C) (Infrared)   Ht 5' 3\" (1.6 m)   Wt 173 lb (78.5 kg)   SpO2 95%   BMI 30.65 kg/m²     Abdominal/wound: Wounds healing very nicely, no signs of infection    Assessment/Plan:       ASSESSMENT/PLAN:    2-week status post robotic low anterior resection. Pathology shows adenomatous polyp without malignancy. Recommend repeat colonoscopy with Dr. Odilon Wilkerson in 1 year. Overall doing great    DISPOSITION: Follow-up with me as needed    Note completed using dictation software, please excuse any errors. Referring/primary care physician updated through [employer] note if PCP was listed.     Electronically signed by Jay Davis MD on 3/28/2023 at 9:04 AM

## 2023-04-03 RX ORDER — DUTASTERIDE 0.5 MG/1
0.5 CAPSULE, LIQUID FILLED ORAL DAILY
Qty: 90 CAPSULE | Refills: 1 | Status: SHIPPED | OUTPATIENT
Start: 2023-04-03

## 2023-04-03 NOTE — TELEPHONE ENCOUNTER
Medication:   Requested Prescriptions     Pending Prescriptions Disp Refills    dutasteride (AVODART) 0.5 MG capsule [Pharmacy Med Name: DUTASTERIDE 0.5MG CAPSULES] 90 capsule 1     Sig: TAKE 1 CAPSULE BY MOUTH DAILY        Last Filled: 10/10/2022   Last appt: 2/17/2023   Next appt: none

## 2023-04-05 ENCOUNTER — CARE COORDINATION (OUTPATIENT)
Dept: CASE MANAGEMENT | Age: 83
End: 2023-04-05

## 2023-04-05 NOTE — CARE COORDINATION
[facility] Transitions Initial Follow Up Call    Call within 2 business days of discharge: Yes    Patient:  Art Curry   Patient :  1940  MRN:  [id number]    Reason for Admission: Rectal Mass, S/P Expl Lap  Discharge Date:  3/15/23   RARS:       Transitions of Care Initial Call    Was this an external facility discharge? Discharge Facility: [address] [place] [address] to be reviewed by the provider   Additional needs identified to be addressed with provider:    no    AMB CC Provider Discharge Needs: none            Non-face-to-face services provided:    1ST CTC attempt to reach Pt regarding recent hospital discharge. CTC left voice recording with call back number requesting a call back.     Follow up appointments:    Future Appointments   Date Time Provider Alta Massey   2023  7:00 AM SCHEDULE, BRYANNA REMOTE TRANSMISSION [facility] Card [facility]   2023  2:30 PM SCHEDULE, Sebastian Lainez Card [facility]   2023  3:00 PM Geoff Bay APRN - CNP [place] [facility]     Thank Palma Ge RN  Care Transition Coordinator  Contact [facility]:[phone]
reviewed and current. Patients top risk factors for readmission: medical condition-Chest Pain, S/P Angiogram  Interventions to address risk factors: Education of patient/family/caregiver/guardian to support self-management-Spouse instructed to continue to monitor for any of the above noted s/s, reporting to MD immediately. Care Transitions Subsequent and Final Call    Schedule Follow Up Appointment with PCP: Declined  Subsequent and Final Calls  Do you have any ongoing symptoms?: No  Have your medications changed?: No  Do you have any questions related to your medications?: No  Do you currently have any active services?: No  Do you have any needs or concerns that I can assist you with?: No  Identified Barriers: None  Care Transitions Interventions  No Identified Needs  Other Interventions:           Care Transition Nurse provided contact information for future needs. No further follow-up call indicated based on severity of symptoms and risk factors.     Thank Evita Sandra RN  Care Transition Coordinator  Contact [facility]:[phone]

## 2023-05-16 ENCOUNTER — TELEPHONE (OUTPATIENT)
Dept: FAMILY MEDICINE CLINIC | Age: 83
End: 2023-05-16

## 2023-05-16 NOTE — TELEPHONE ENCOUNTER
I called the patient wife back and made her aware. She verbalized understanding and will call back with any concerns.

## 2023-05-16 NOTE — TELEPHONE ENCOUNTER
Yes, he can take that. If that helps him keep some liquids down and avoid er visit would be good. If they need a new rx I can send as well.

## 2023-05-16 NOTE — TELEPHONE ENCOUNTER
Patient's wife is calling in stating that around 3 am the patient started vomiting. The patient has not been able to even keep water down since this. Siddharth Busby started with these symptoms on Mother's Day and than last night the patient started with this. She states that ever since the patient had his staph infection a couple years ago, if he does not stay hydrated they have to take him to the hospital to get fluids. She is trying to avoid this at all cost.     The reason for her call is to ask if he is able to take Ondansetron 4 mg. This was an old prescription that Siddharth Busby was prescribed from a hospital stay but never took it. Please advise patient's wife if he is able to take his medication.

## 2023-05-17 ENCOUNTER — NURSE ONLY (OUTPATIENT)
Dept: CARDIOLOGY CLINIC | Age: 83
End: 2023-05-17
Payer: MEDICARE

## 2023-05-17 ENCOUNTER — OFFICE VISIT (OUTPATIENT)
Dept: CARDIOLOGY CLINIC | Age: 83
End: 2023-05-17
Payer: MEDICARE

## 2023-05-17 VITALS
WEIGHT: 172.8 LBS | SYSTOLIC BLOOD PRESSURE: 130 MMHG | HEART RATE: 78 BPM | BODY MASS INDEX: 30.61 KG/M2 | DIASTOLIC BLOOD PRESSURE: 80 MMHG

## 2023-05-17 DIAGNOSIS — I44.2 CHB (COMPLETE HEART BLOCK) (HCC): Primary | ICD-10-CM

## 2023-05-17 DIAGNOSIS — I44.30 AVB (ATRIOVENTRICULAR BLOCK): ICD-10-CM

## 2023-05-17 DIAGNOSIS — I45.5 SINUS PAUSE: ICD-10-CM

## 2023-05-17 DIAGNOSIS — I10 BENIGN ESSENTIAL HTN: ICD-10-CM

## 2023-05-17 DIAGNOSIS — Z95.0 CARDIAC PACEMAKER IN SITU: Primary | ICD-10-CM

## 2023-05-17 DIAGNOSIS — Z95.0 CARDIAC PACEMAKER IN SITU: ICD-10-CM

## 2023-05-17 PROCEDURE — 93280 PM DEVICE PROGR EVAL DUAL: CPT | Performed by: INTERNAL MEDICINE

## 2023-05-17 PROCEDURE — 3075F SYST BP GE 130 - 139MM HG: CPT | Performed by: NURSE PRACTITIONER

## 2023-05-17 PROCEDURE — 1124F ACP DISCUSS-NO DSCNMKR DOCD: CPT | Performed by: NURSE PRACTITIONER

## 2023-05-17 PROCEDURE — 99214 OFFICE O/P EST MOD 30 MIN: CPT | Performed by: NURSE PRACTITIONER

## 2023-05-17 PROCEDURE — 3079F DIAST BP 80-89 MM HG: CPT | Performed by: NURSE PRACTITIONER

## 2023-05-17 NOTE — PROGRESS NOTES
Patient comes in for a 3 month programming evaluation on their [employer] DC PPM.     Last remote 4.12.2023    All sensing and pacing parameters appear unchanged since last in office check on 03.06.2023. PAC/As/Competitive Atrial pacing noted on presenting. MVP On. Reviewed w/NP. Will monitor. 11.3 years estimated until BALDEV/RRT. Underlying AsVs w/intermittent HB. AP 44.2%.  33.9%. PVC 2.8/hr  1 SVT noted. OBSERVATIONS (1)  - Alert: Cumulative RV Pacing greater than 40% for 7 days. Outputs lowered and battery increased to 15 years. Time and carelink alerts adjusted. Please see interrogation for more detail. Patient will see NPFW today. We will continue to monitor remotely.

## 2023-07-11 ENCOUNTER — TELEPHONE (OUTPATIENT)
Dept: PRIMARY CARE CLINIC | Age: 83
End: 2023-07-11

## 2023-07-12 ENCOUNTER — TELEPHONE (OUTPATIENT)
Dept: FAMILY MEDICINE CLINIC | Age: 83
End: 2023-07-12

## 2023-07-12 ENCOUNTER — TELEPHONE (OUTPATIENT)
Dept: DERMATOLOGY | Age: 83
End: 2023-07-12

## 2023-07-12 NOTE — TELEPHONE ENCOUNTER
Patient's wife called in to get herself and the patient scheduled for their AWV. She would like to know if any blood work is needed before this appointment. Please advise.

## 2023-07-12 NOTE — TELEPHONE ENCOUNTER
Pt wife Alyssa Jade calling wanting to see if  would see her  as a new patient she sees her daughter Rosalba Bates I informed her it would be in the fall pls return call back @ [phone]

## 2023-07-13 NOTE — TELEPHONE ENCOUNTER
MD Bladimir Rivera LPN  Caller: Unspecified Kevin Yang, 11:26 AM)  Hank Truong to schedule as long as they do not mind waiting until fall     Patient scheduled for 9/5/23 at 9:00am.

## 2023-08-14 NOTE — TELEPHONE ENCOUNTER
Medication:   Requested Prescriptions     Pending Prescriptions Disp Refills    tamsulosin (FLOMAX) 0.4 MG capsule [Pharmacy Med Name: TAMSULOSIN 0.4MG CAPSULES] 180 capsule 3     Sig: TAKE 2 CAPSULES BY MOUTH DAILY        Last Filled:  8/19/22    Patient Phone Number: [phone] (home) [phone] (work)    Last appt: 2/17/2023   Next appt: 8/22/2023    Last OARRS: No flowsheet data found.

## 2023-08-14 NOTE — TELEPHONE ENCOUNTER
Medication:   Requested Prescriptions     Pending Prescriptions Disp Refills    rosuvastatin (CRESTOR) 10 MG tablet [Pharmacy Med Name: ROSUVASTATIN 10MG TABLETS] 90 tablet 3     Sig: TAKE 1 TABLET BY MOUTH EVERY DAY       Last Filled:  8/23/22    Patient Phone Number: [phone] (home) [phone] (work)    Last appt: 2/17/2023   Next appt: 8/14/2023    Last Lipid:   Lab Results   Component Value Date/Time    CHOL 158 02/17/2023 10:12 AM    TRIG 81 02/17/2023 10:12 AM    HDL 55 02/17/2023 10:12 AM    HDL 43 05/23/2012 06:31 AM    LDLCALC 87 02/17/2023 10:12 AM

## 2023-08-14 NOTE — TELEPHONE ENCOUNTER
Medication:   Requested Prescriptions     Pending Prescriptions Disp Refills    rosuvastatin (CRESTOR) 10 MG tablet [Pharmacy Med Name: ROSUVASTATIN 10MG TABLETS] 90 tablet 3     Sig: TAKE 1 TABLET BY MOUTH EVERY DAY       Last Filled:  8/23/22    Patient Phone Number: [phone] (home) [phone] (work)    Last appt: 2/17/2023   Next appt: 8/14/2023    Last Lipid:   Lab Results   Component Value Date/Time    CHOL 158 02/17/2023 10:12 AM    TRIG 81 02/17/2023 10:12 AM    HDL 55 02/17/2023 10:12 AM    HDL 43 05/23/2012 06:31 AM    LDLCALC 87 02/17/2023 10:12 AM

## 2023-08-15 RX ORDER — TAMSULOSIN HYDROCHLORIDE 0.4 MG/1
0.8 CAPSULE ORAL DAILY
Qty: 180 CAPSULE | Refills: 3 | Status: SHIPPED | OUTPATIENT
Start: 2023-08-15

## 2023-08-15 RX ORDER — ROSUVASTATIN CALCIUM 10 MG/1
TABLET, COATED ORAL
Qty: 90 TABLET | Refills: 3 | Status: SHIPPED | OUTPATIENT
Start: 2023-08-15

## 2023-08-22 ENCOUNTER — OFFICE VISIT (OUTPATIENT)
Dept: FAMILY MEDICINE CLINIC | Age: 83
End: 2023-08-22
Payer: MEDICARE

## 2023-08-22 VITALS
DIASTOLIC BLOOD PRESSURE: 82 MMHG | SYSTOLIC BLOOD PRESSURE: 124 MMHG | HEART RATE: 78 BPM | WEIGHT: 173 LBS | BODY MASS INDEX: 29.53 KG/M2 | OXYGEN SATURATION: 98 % | HEIGHT: 64 IN

## 2023-08-22 DIAGNOSIS — Z00.00 MEDICARE ANNUAL WELLNESS VISIT, SUBSEQUENT: Primary | ICD-10-CM

## 2023-08-22 PROCEDURE — G0439 PPPS, SUBSEQ VISIT: HCPCS | Performed by: FAMILY MEDICINE

## 2023-08-22 PROCEDURE — 1124F ACP DISCUSS-NO DSCNMKR DOCD: CPT | Performed by: FAMILY MEDICINE

## 2023-08-22 ASSESSMENT — LIFESTYLE VARIABLES
HOW MANY STANDARD DRINKS CONTAINING ALCOHOL DO YOU HAVE ON A TYPICAL DAY: 1 OR 2
HOW OFTEN DO YOU HAVE A DRINK CONTAINING ALCOHOL: 2-3 TIMES A WEEK

## 2023-08-22 ASSESSMENT — PATIENT HEALTH QUESTIONNAIRE - PHQ9
SUM OF ALL RESPONSES TO PHQ QUESTIONS 1-9: 0
1. LITTLE INTEREST OR PLEASURE IN DOING THINGS: 0
SUM OF ALL RESPONSES TO PHQ QUESTIONS 1-9: 0
SUM OF ALL RESPONSES TO PHQ9 QUESTIONS 1 & 2: 0
2. FEELING DOWN, DEPRESSED OR HOPELESS: 0

## 2023-08-22 NOTE — PROGRESS NOTES
TABLET BY MOUTH EVERY DAY  José Yun MD   tamsulosin (FLOMAX) 0.4 MG capsule TAKE 2 CAPSULES BY MOUTH DAILY  José Yun MD   dutasteride (AVODART) 0.5 MG capsule TAKE 1 CAPSULE BY MOUTH DAILY  José Ynu MD   aspirin 81 MG tablet Take 1 tablet by mouth daily  Historical Provider, MD   Flaxseed, Linseed, 1000 MG CAPS Take 1 capsule by mouth 2 times daily  Historical Provider, MD   multivitamin [facility]) per tablet Take 1 tablet by mouth daily  Historical Provider, MD   Psyllium (METAMUCIL PO) Take 2 Units by mouth daily. 2 TSP.   Historical Provider, MD Chi (Including outside providers/suppliers regularly involved in providing care):   Patient Care Team:  José Yun MD as PCP - General (Family Medicine)  José Yun MD as PCP - Empaneled Provider  Rod Decker PSYD (Psychology)  Raul Mejia MD as Consulting Physician (Cardiology)     Reviewed and updated this visit:

## 2023-08-30 ENCOUNTER — TELEPHONE (OUTPATIENT)
Dept: CARDIOLOGY CLINIC | Age: 83
End: 2023-08-30

## 2023-08-30 NOTE — TELEPHONE ENCOUNTER
Shen Randall,  Patient last seen on 05.17.2023 (you/device),   \"Patient had an echo in December 2022 that showed an EF of 50% along with a concern for atrial fibrillation due to an irregular rhythm that was noted during the exam.  Patient then wore a ZIO monitor in December 2022 that showed 278 episodes of a high degree AV block. \"    Next appt on 01.25.2024 UL/device. Continue to monitor or bring in sooner? Please advise. Thanks.

## 2023-08-30 NOTE — TELEPHONE ENCOUNTER
BETHANY. Remote received on patients pacemaker. Patient had 2 episodes of AF both lasting 1 minute. This pt is not on anti coags. Report sent to [facility]. Can you address with the providers. Thanks.

## 2023-08-30 NOTE — TELEPHONE ENCOUNTER
I would not place on [facility] for episode lasting 1 minute. Let's wait to bring in.

## 2023-09-04 PROCEDURE — 93296 REM INTERROG EVL PM/IDS: CPT | Performed by: INTERNAL MEDICINE

## 2023-09-04 PROCEDURE — 93294 REM INTERROG EVL PM/LDLS PM: CPT | Performed by: INTERNAL MEDICINE

## 2023-09-05 ENCOUNTER — OFFICE VISIT (OUTPATIENT)
Dept: DERMATOLOGY | Age: 83
End: 2023-09-05
Payer: MEDICARE

## 2023-09-05 DIAGNOSIS — L57.0 ACTINIC KERATOSIS TREATED WITH TOPICAL FLUOROURACIL (5FU): ICD-10-CM

## 2023-09-05 DIAGNOSIS — D22.9 BENIGN NEVUS: ICD-10-CM

## 2023-09-05 DIAGNOSIS — Z85.828 HISTORY OF NONMELANOMA SKIN CANCER: ICD-10-CM

## 2023-09-05 DIAGNOSIS — L57.8 DIFFUSE PHOTODAMAGE OF SKIN: ICD-10-CM

## 2023-09-05 DIAGNOSIS — D23.9 BLUE NEVUS: ICD-10-CM

## 2023-09-05 DIAGNOSIS — D48.5 NEOPLASM OF UNCERTAIN BEHAVIOR OF SKIN: Primary | ICD-10-CM

## 2023-09-05 PROCEDURE — 12031 INTMD RPR S/A/T/EXT 2.5 CM/<: CPT | Performed by: DERMATOLOGY

## 2023-09-05 PROCEDURE — 11401 EXC TR-EXT B9+MARG 0.6-1 CM: CPT | Performed by: DERMATOLOGY

## 2023-09-05 PROCEDURE — 99204 OFFICE O/P NEW MOD 45 MIN: CPT | Performed by: DERMATOLOGY

## 2023-09-05 PROCEDURE — 1124F ACP DISCUSS-NO DSCNMKR DOCD: CPT | Performed by: DERMATOLOGY

## 2023-09-05 PROCEDURE — 11102 TANGNTL BX SKIN SINGLE LES: CPT | Performed by: DERMATOLOGY

## 2023-09-05 PROCEDURE — 11103 TANGNTL BX SKIN EA SEP/ADDL: CPT | Performed by: DERMATOLOGY

## 2023-09-05 NOTE — PROGRESS NOTES
[facility]) Dermatology  Malcolm Flanagan M.D.  [phone]       Marine Lucia  1940    78 y.o. male     Date of Visit: 9/5/2023    Chief Complaint:   Chief Complaint   Patient presents with    Skin Lesion        I was asked to see this patient by Dr. Devi ref. provider found. History of Present Illness:  1. 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 Slowly increasing in size and number. New crusted papule right lower back. Developed over the summer. Nontender. Has increased in size. Very pruritic. Multiple nevi. Patient has not noticed any new or changing pigmented lesions. Stable in size, shape, color. Not itching, bleeding. Skin History:  11/15 scc left forehead     + AVR- endocarditis. + ABX prophy    + Pacemaker      Review of Systems:  Constitutional: Reports general sense of well-being       Past Medical History, Surgical History, Family History, Medications and Allergies reviewed. Social History:   Social History     Socioeconomic History    Marital status:      Spouse name: Not on file    Number of children: Not on file    Years of education: Not on file    Highest education level: Not on file   Occupational History    Not on file   Tobacco Use    Smoking status: Never    Smokeless tobacco: Never   Substance and Sexual Activity    Alcohol use:  Yes     Alcohol/week: 1.0 standard drink     Types: 1 Shots of liquor per week    Drug use: Not on file    Sexual activity: Not on file   Other Topics Concern    Not on file   Social History Narrative    Not on file     Social Determinants of Health     Financial Resource Strain: Low Risk     Difficulty of Paying Living Expenses: Not hard at all   Food Insecurity: No Food Insecurity    Worried About Running Out of Food in the Last Year: Never true    Ran Out of Food in the Last Year: Never true   Transportation Needs: Unknown

## 2023-09-07 ENCOUNTER — TELEPHONE (OUTPATIENT)
Dept: DERMATOLOGY | Age: 83
End: 2023-09-07

## 2023-09-07 ENCOUNTER — HOSPITAL ENCOUNTER (EMERGENCY)
Age: 83
Discharge: HOME | End: 2023-09-07
Attending: EMERGENCY MEDICINE
Payer: MEDICARE

## 2023-09-07 ENCOUNTER — APPOINTMENT (OUTPATIENT)
Dept: GENERAL RADIOLOGY | Age: 83
End: 2023-09-07
Payer: MEDICARE

## 2023-09-07 ENCOUNTER — APPOINTMENT (OUTPATIENT)
Dept: CT IMAGING | Age: 83
End: 2023-09-07
Payer: MEDICARE

## 2023-09-07 VITALS
RESPIRATION RATE: 18 BRPM | BODY MASS INDEX: 29.47 KG/M2 | HEART RATE: 60 BPM | HEIGHT: 64 IN | OXYGEN SATURATION: 96 % | TEMPERATURE: 98.1 F | WEIGHT: 172.6 LBS | SYSTOLIC BLOOD PRESSURE: 116 MMHG | DIASTOLIC BLOOD PRESSURE: 96 MMHG

## 2023-09-07 DIAGNOSIS — S09.90XA CLOSED HEAD INJURY, INITIAL ENCOUNTER: Primary | ICD-10-CM

## 2023-09-07 DIAGNOSIS — S69.92XA FINGER INJURY, LEFT, INITIAL ENCOUNTER: ICD-10-CM

## 2023-09-07 LAB — DERMATOLOGY PATHOLOGY REPORT: ABNORMAL

## 2023-09-07 PROCEDURE — 99284 EMERGENCY DEPT VISIT MOD MDM: CPT

## 2023-09-07 PROCEDURE — 73130 X-RAY EXAM OF HAND: CPT

## 2023-09-07 PROCEDURE — 70450 CT HEAD/BRAIN W/O DYE: CPT

## 2023-09-07 RX ORDER — BACITRACIN ZINC AND POLYMYXIN B SULFATE 500; 1000 [USP'U]/G; [USP'U]/G
OINTMENT TOPICAL ONCE
Status: DISCONTINUED | OUTPATIENT
Start: 2023-09-07 | End: 2023-09-07 | Stop reason: HOSPADM

## 2023-09-07 ASSESSMENT — PAIN DESCRIPTION - DESCRIPTORS: DESCRIPTORS: ACHING

## 2023-09-07 ASSESSMENT — PAIN SCALES - GENERAL
PAINLEVEL_OUTOF10: 5
PAINLEVEL_OUTOF10: 5

## 2023-09-07 ASSESSMENT — PAIN - FUNCTIONAL ASSESSMENT: PAIN_FUNCTIONAL_ASSESSMENT: 0-10

## 2023-09-07 ASSESSMENT — PAIN DESCRIPTION - LOCATION: LOCATION: HAND;FACE

## 2023-09-07 ASSESSMENT — PAIN DESCRIPTION - PAIN TYPE: TYPE: ACUTE PAIN

## 2023-09-07 ASSESSMENT — PAIN DESCRIPTION - ORIENTATION: ORIENTATION: LEFT;RIGHT

## 2023-09-07 NOTE — ED PROVIDER NOTES
history that includes hernia repair; Vasectomy; joint replacement; knee surgery (04/2012); Colonoscopy; Cardiac surgery (06/2012); knee surgery (03/2012); Aortic valve surgery; Mitral valve surgery; Total knee arthroplasty; Knee Prosthesis Removal (Left); other surgical history (07/03/2016); Upper gastrointestinal endoscopy (N/A, 08/31/2022); Colonoscopy (N/A, 08/31/2022); Colonoscopy (N/A, 08/31/2022); Colonoscopy (N/A, 08/31/2022); Pacemaker insertion (Left, 01/31/2023); and Small intestine surgery (N/A, 3/13/2023). family history includes Cancer in his brother; Heart Disease in his father; Stroke in his father and mother. He reports that he has never smoked. He has never used smokeless tobacco. He reports current alcohol use of about 1.0 standard drink per week. Pertinent Physical Exam Findings:   Small residual hematoma over the right eyebrow, with bright purple ecchymosis of the upper and lower eyelid on the right, pooling mostly under the eye. There is small abrasion on the right lateral aspect of the nose, but no bony tenderness palpation of the nose. No other facial bony tenderness noted. Various small abrasions noted to the hands and fingers, with 1 small, generally well approximated laceration just at the tip of the left index finger.             Robbin Escalante MD  09/07/23 0912

## 2023-09-07 NOTE — DISCHARGE INSTRUCTIONS
ED Course     Today, you were seen in the Emergency Department. You have been diagnosed with:   1. Closed head injury, initial encounter    2. Finger injury, left, initial encounter      Disposition/Plan     IMPORTANT INSTRUCTIONS:  You were seen in the emergency department today for evaluation after a fall    Your work up here was negative including CT scan of your head and your finger x-ray. Keep your abrasions clean and dry. Wash with mild soap and water 1-2 times daily and apply a thin layer of antibiotic ointment until healed. Monitor and return for signs of infection such as redness, fever or pus like drainage. SEEK IMMEDIATE MEDICAL CARE IF:   You develop a severe headache  You develop visual changes, double vision, blurry vision  You have numbness, weakness, tingling  You have severe nausea, vomiting  Others notice you are persistently drowsy or have an altered level of consciousness  You have another fall  You have severe symptoms that are different from your first symptoms. Or you have any other concerns      PLEASE FOLLOW UP WITH:  Mandie Willis MD  [facility] [address] [address] [phone]    Schedule an appointment as soon as possible for a visit       Additional important information has been included with this packet, please make sure that you have read this information as it will better help you understand you illness/injury better.

## 2023-09-07 NOTE — ED PROVIDER NOTES
[facility] ENCOUNTER          PHYSICIAN ASSISTANT NOTE     Date of evaluation: 9/7/2023    Chief Complaint     Fall (Fall yesterday around 4pm, tripped up a concrete step. Denies loss of consciousness. Pain to left hand fingers, nose, right eye brow/forehead. )    History of Present Illness     HPI: Ron Luis is a 78 y.o. male with history of hypertension, HLD who presents to the emergency department with fall. Patient was walking in his backyard yesterday when he tripped over a step, landing forward on his face and catching himself with his finger. The was around 4 PM yesterday. He did strike his head though denies losing consciousness. He takes a baby aspirin but no other anticoagulation. He has pain primarily to his left index finger. He also some pain to the bridge of his nose. Denies any neck or back pain. He denies any pain to his chest or abdomen. He does have some abrasions on his bilateral knees though denies any pain to these areas and has been able to ambulate without any difficulty. He denies any lightheadedness or presyncopal symptoms, states that he tripped over a curb. With the exception of the above, there are no aggravating or alleviating factors. Review of Systems     Review of Systems   Neurological:  Negative for dizziness, syncope and headaches. As stated above, all other systems reviewed and are otherwise negative.      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 Given patient's wound I do not feel it is amenable to closure as it is at the nailbed, without any visible underlying subungual hematoma. For this reason I do not feel he requires nail removal at this time. Patient's wound was covered with Polysporin and nonadherent dressing. They were given wound care instructions. He was advised to follow-up with his primary care provider sometime next week with strict return precautions in the interim. Medical Decision Making  Problems Addressed:  Closed head injury, initial encounter: acute illness or injury  Finger injury, left, initial encounter: acute illness or injury    Amount and/or Complexity of Data Reviewed  Independent Historian: spouse  Radiology: ordered. The patient was evaluated by myself and the ED Attending Physician, Dr. Ignacio Martini. All management and disposition plans were discussed and agreed upon. Clinical Impression     1. Closed head injury, initial encounter    2. Finger injury, left, initial encounter        This note was dictated using voice-recognition software, which occasionally leads to inadvertent typographic errors.     Disposition     PATIENT REFERRED TO:  Tawni Eisenmenger, MD  [address] [place] [address] [phone]    Schedule an appointment as soon as possible for a visit         DISCHARGE MEDICATIONS:  Discharge Medication List as of 9/7/2023 12:07 PM          DISPOSITION Decision To Discharge 09/07/2023 12:19:37 PM       Tam Odell[address]  09/07/23 3547

## 2023-09-07 NOTE — TELEPHONE ENCOUNTER
Latasha from [employer] called. She wants to let Dr. Robert Sanchez know about a report that is being sent over. 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     Phone [phone]

## 2023-09-11 DIAGNOSIS — C43.59 MELANOMA OF LOWER BACK (HCC): Primary | ICD-10-CM

## 2023-09-12 ENCOUNTER — OFFICE VISIT (OUTPATIENT)
Dept: SURGERY | Age: 83
End: 2023-09-12
Payer: MEDICARE

## 2023-09-12 ENCOUNTER — OFFICE VISIT (OUTPATIENT)
Dept: FAMILY MEDICINE CLINIC | Age: 83
End: 2023-09-12
Payer: MEDICARE

## 2023-09-12 ENCOUNTER — TELEPHONE (OUTPATIENT)
Dept: FAMILY MEDICINE CLINIC | Age: 83
End: 2023-09-12

## 2023-09-12 VITALS
TEMPERATURE: 98 F | WEIGHT: 172.5 LBS | BODY MASS INDEX: 29.45 KG/M2 | DIASTOLIC BLOOD PRESSURE: 95 MMHG | HEIGHT: 64 IN | SYSTOLIC BLOOD PRESSURE: 139 MMHG | HEART RATE: 79 BPM

## 2023-09-12 VITALS
OXYGEN SATURATION: 100 % | DIASTOLIC BLOOD PRESSURE: 84 MMHG | SYSTOLIC BLOOD PRESSURE: 120 MMHG | BODY MASS INDEX: 29.37 KG/M2 | HEIGHT: 64 IN | HEART RATE: 61 BPM | RESPIRATION RATE: 16 BRPM | TEMPERATURE: 97.1 F | WEIGHT: 172 LBS

## 2023-09-12 DIAGNOSIS — S06.0X0D CONCUSSION WITHOUT LOSS OF CONSCIOUSNESS, SUBSEQUENT ENCOUNTER: ICD-10-CM

## 2023-09-12 DIAGNOSIS — E78.2 MIXED HYPERLIPIDEMIA: ICD-10-CM

## 2023-09-12 DIAGNOSIS — C43.59 MALIGNANT MELANOMA OF TORSO EXCLUDING BREAST (HCC): Primary | ICD-10-CM

## 2023-09-12 DIAGNOSIS — Z01.818 PREOP EXAMINATION: Primary | ICD-10-CM

## 2023-09-12 DIAGNOSIS — C43.59 MALIGNANT MELANOMA OF TORSO EXCLUDING BREAST (HCC): ICD-10-CM

## 2023-09-12 PROBLEM — M17.11 ARTHRITIS OF RIGHT KNEE: Status: ACTIVE | Noted: 2019-01-14

## 2023-09-12 PROBLEM — T84.498A MECHANICAL COMPLICATION OF INTERNAL ORTHOPEDIC DEVICE, IMPLANT OR GRAFT (HCC): Status: ACTIVE | Noted: 2019-01-08

## 2023-09-12 PROCEDURE — 3074F SYST BP LT 130 MM HG: CPT | Performed by: FAMILY MEDICINE

## 2023-09-12 PROCEDURE — 3074F SYST BP LT 130 MM HG: CPT | Performed by: SURGERY

## 2023-09-12 PROCEDURE — 3078F DIAST BP <80 MM HG: CPT | Performed by: SURGERY

## 2023-09-12 PROCEDURE — 1124F ACP DISCUSS-NO DSCNMKR DOCD: CPT | Performed by: FAMILY MEDICINE

## 2023-09-12 PROCEDURE — 99205 OFFICE O/P NEW HI 60 MIN: CPT | Performed by: SURGERY

## 2023-09-12 PROCEDURE — 1123F ACP DISCUSS/DSCN MKR DOCD: CPT | Performed by: SURGERY

## 2023-09-12 PROCEDURE — 99214 OFFICE O/P EST MOD 30 MIN: CPT | Performed by: FAMILY MEDICINE

## 2023-09-12 PROCEDURE — 3079F DIAST BP 80-89 MM HG: CPT | Performed by: FAMILY MEDICINE

## 2023-09-12 RX ORDER — AMOXICILLIN 500 MG/1
CAPSULE ORAL
Qty: 12 CAPSULE | Refills: 1 | Status: SHIPPED | OUTPATIENT
Start: 2023-09-12

## 2023-09-12 ASSESSMENT — ENCOUNTER SYMPTOMS: RESPIRATORY NEGATIVE: 1

## 2023-09-12 NOTE — PROGRESS NOTES
Exam  Constitutional:       General: He is not in acute distress. Appearance: Normal appearance. He is well-developed. He is not diaphoretic. HENT:      Head: Normocephalic and atraumatic. Mouth/Throat:      Mouth: Mucous membranes are moist.      Pharynx: Oropharynx is clear. Eyes:      General: No scleral icterus. Neck:      Thyroid: No thyromegaly. Trachea: No tracheal deviation. Cardiovascular:      Rate and Rhythm: Normal rate and regular rhythm. Heart sounds: Normal heart sounds. No murmur heard. Pulmonary:      Effort: Pulmonary effort is normal.      Breath sounds: Normal breath sounds. No wheezing or rales. Musculoskeletal:      Cervical back: Normal range of motion and neck supple. Lymphadenopathy:      Head:      Right side of head: No submental or submandibular adenopathy. Left side of head: No submental or submandibular adenopathy. Cervical: No cervical adenopathy. Skin:     General: Skin is warm and dry. Neurological:      Mental Status: He is alert and oriented to person, place, and time. Cranial Nerves: No cranial nerve deficit. Psychiatric:         Behavior: Behavior normal.         Thought Content: Thought content normal.         Judgment: Judgment normal.              An electronic signature was used to authenticate this note.     --Yoav Watson MD

## 2023-09-12 NOTE — TELEPHONE ENCOUNTER
Patient's wife is calling in stating that the surgical oncologist was able to see the patient today instead of Friday. They are trying to get the patient scheduled for surgery 09/27 but it may be sooner. Blanche Wang wants to know if  can addend his note to state the patient was seen for a pre-op physical. There is no additional testing needed. Please advise. [facility] Surgical Oncology and General Surgery  [facility]  [address] [facility], [facility] [address]  Phone: [phone]  Fax: [phone]

## 2023-09-12 NOTE — PROGRESS NOTES
[facility] Surgical Oncology and General Surgery  [address], [address]  Phone: [phone]  Fax: [phone]    Visit Date: 9/12/2023    HPI:   Patient is a 78 y.o. male referred by Dr. Félix Sun for evaluation and management of melanoma. Jcarlos Jayleen of the right lower back The lesion is has been present for 6-8 months. It has recently increased in size. It has not been bleeding. The lesion has been pruritic. Patient does not have any other lesions of concern. He does not have a personal or family history of melanoma. Rachael Hudson does not have significant subcutaneous mass, swelling in axilla, groin, or supraclavicular regions, cough, abdominal pain, jaundice, blood in stools, headaches, recent neurological changes or bone pain to indicate any metastatic disease. Past Medical History:   Diagnosis Date    Aortic valve disorder     Arthritis     Blood transfusion     BPH (benign prostatic hypertrophy)     Cancer (HCC)     SKIN.     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    Diverticulosis of colon     Diverticulosis of large intestine 05/15/2010    Essential hypertension 01/17/2013    History of blood transfusion     History of GI bleed 04/2012    Hydronephrosis, left 07/02/2016    Hyperlipidemia     Left carpal tunnel syndrome 01/26/2022    Mitral valve disorder     MSSA (methicillin susceptible Staphylococcus aureus) septicemia ([address]) 03/2012    Osteoarthritis     neck, back, knees    Partial small bowel obstruction (HCC)     Sleep apnea     Staph aureus infection 03/10/2012    blood    TIA (transient ischemic attack) 05/2012    Wears glasses     Wears hearing aid in both ears        Past Surgical History:   Procedure Laterality Date    AORTIC VALVE [address]  06/2012    angiogram-no blockage    COLONOSCOPY      COLONOSCOPY N/A 08/31/2022    COLONOSCOPY WITH BIOPSY performed by Harvey Falcon MD at [address] N/A 08/31/2022

## 2023-09-13 ENCOUNTER — TELEPHONE (OUTPATIENT)
Dept: SURGERY | Age: 83
End: 2023-09-13

## 2023-09-13 DIAGNOSIS — C43.59 MALIGNANT MELANOMA OF TORSO EXCLUDING BREAST (HCC): Primary | ICD-10-CM

## 2023-09-13 NOTE — TELEPHONE ENCOUNTER
Pre-op Call     Pre-op phone call completed 9/13/2023 9:17 AM     Arrival date/time at [facility]: Tuesday September 19th. 07:30     Surgical site and laterality confirmed: Yes    [x] Reminded to be NPO after midnight    Pre-op H&P  [x] To be completed with PCP  [] To be completed day of surgery       Lymphoscintigram scheduled if needed:  Yes    Pre-op labs completed (if needed):   NA    Blood thinning medications held for surgery: Not on any anticoagulation     Bowel prep reviewed: Not needed     Has ride home from hospital: Yes       Instructed to call with any questions or concerns. Verbalized understanding.

## 2023-09-15 NOTE — PROGRESS NOTES
[facility] PRE-SURGICAL TESTING INSTRUCTIONS                      PRIOR TO PROCEDURE DATE:    1. PLEASE FOLLOW ANY INSTRUCTIONS GIVEN TO YOU PER YOUR SURGEON. 2. Arrange for someone to drive you home and be with you for the first 24 hours after discharge for your safety after your procedure for which you received sedation. Ensure it is someone we can share information with regarding your discharge. NOTE: At this time ONLY 2 ADULTS may accompany you   One person ENCOURAGED to stay at hospital entire time if outpatient surgery      3. You must contact your surgeon for instructions IF:  You are taking any blood thinners, aspirin, anti-inflammatory or vitamins. There is a change in your physical condition such as a cold, fever, rash, cuts, sores, or any other infection, especially near your surgical site. 4. Do not drink alcohol the day before or day of your procedure. Do not use any recreational marijuana at least 24 hours or street drugs (heroin, cocaine) at minimum 5 days prior to your procedure. 5. A Pre-Surgical History and Physical MUST be completed WITHIN 30 DAYS OR LESS prior to your procedure. by your Physician or an Urgent Care        THE DAY OF YOUR PROCEDURE:  1. Follow instructions for ARRIVAL TIME as DIRECTED BY YOUR SURGEON. 2. Enter the MAIN entrance from [employer] and follow the signs to the free Parking [employer] or [employer] (offered free of charge 7 am-5pm). 3. Enter the Main Entrance of the hospital (do not enter from the lower level of the parking garage). Upon entrance, check in with the  at the surgical information desk on your LEFT. Bring your insurance card and photo ID to register      4. DO NOT EAT ANYTHING 8 hours prior to arrival for surgery. You may have up to 8 ounces of water 4 hours prior to your arrival for surgery.    NOTE: ALL Gastric, Bariatric & Bowel surgery patients - you MUST follow your surgeon's instructions regarding with you on the day of your procedure. 10. If you use oxygen at home, please bring your oxygen tank with you to hospital..     11. We recommend that valuable personal belongings such as cash, cell phones, e-tablets, or jewelry, be left at home during your stay. The hospital will not be responsible for valuables that are not secured in the hospital safe. However, if your insurance requires a co-pay, you may want to bring a method of payment, i.e., Check or credit card, if you wish to pay your co-pay the day of surgery. 12. If you are to stay overnight, you may bring a bag with personal items. Please have any large items you may need brought in by your family after your arrival to your hospital room. 15. If you have a Living Will or Durable Power of , please bring a copy on the day of your procedure. How we keep you safe and work to prevent surgical site infections:   1. Health care workers should always check your ID bracelet to verify your name and birth date. You will be asked many times to state your name, date of birth, and allergies. 2. Health care workers should always clean their hands with soap or alcohol gel before providing care to you. It is okay to ask anyone if they cleaned their hands before they touch you. 3. You will be actively involved in verifying the type of procedure you are having and ensuring the correct surgical site. This will be confirmed multiple times prior to your procedure. Do NOT nicole your surgery site UNLESS instructed to by your surgeon. 4. When you are in the operating room, your surgical site will be cleansed with a special soap, and in most cases, you will be given an antibiotic before the surgery begins. What to expect AFTER your procedure? 1. Immediately following your procedure, your will be taken to the PACU for the first phase of your recovery.   Your nurse will help you recover from any potential side effects of anesthesia, such as

## 2023-09-15 NOTE — PROGRESS NOTES
VIA CHAT MSG TO NBA:    [1:11 PM] Ana Hong AFTERNOON, ON N.B  38 SCHEDULED , IN H&P MENTIONS AMOXICILLIN DOS TO TAKE, WHEN TALKING TO PT'S WIFE SHE WASN'T REAL CLEAR IF THIS WAS JUST FOR DENTAL PROCEDURE AND SAID SURGEON WOULD TAKE CARE OF , BUT IT IS LISTED IN H&P,  ALSO PT HAD STAPH INFECTION IN , ALSO HX OF CDIFF. DOES PT NEED TO TAKE? NO LABS AVAILABLE FYI AND LAST EKG I SEE  IS FROM . ONLY ORDERS FROM [facility] NOTED ARE FOR ANCEF.-DG    [1:16 PM] Breonna Salazar    ALSO ON N., JUST CLARIFYING NO CARDIAC CL REQUESTED? HAS VALVE REPAIR AND PACER.  THANKS    PT'S WIFE DENIED ANY REQUEST FOR CARDIO CL, ASKED HER TO BRING INFO ABOUT PACER-DG

## 2023-09-18 ENCOUNTER — ANESTHESIA EVENT (OUTPATIENT)
Dept: OPERATING ROOM | Age: 83
End: 2023-09-18
Payer: MEDICARE

## 2023-09-19 ENCOUNTER — APPOINTMENT (OUTPATIENT)
Dept: NUCLEAR MEDICINE | Age: 83
End: 2023-09-19
Attending: SURGERY
Payer: MEDICARE

## 2023-09-19 ENCOUNTER — ANESTHESIA (OUTPATIENT)
Dept: OPERATING ROOM | Age: 83
End: 2023-09-19
Payer: MEDICARE

## 2023-09-19 ENCOUNTER — HOSPITAL ENCOUNTER (OUTPATIENT)
Age: 83
Setting detail: OUTPATIENT SURGERY
Discharge: HOME | End: 2023-09-19
Attending: SURGERY | Admitting: SURGERY
Payer: MEDICARE

## 2023-09-19 VITALS
WEIGHT: 169.4 LBS | SYSTOLIC BLOOD PRESSURE: 132 MMHG | HEIGHT: 64 IN | OXYGEN SATURATION: 96 % | DIASTOLIC BLOOD PRESSURE: 96 MMHG | BODY MASS INDEX: 28.92 KG/M2 | RESPIRATION RATE: 15 BRPM | HEART RATE: 89 BPM | TEMPERATURE: 97.2 F

## 2023-09-19 DIAGNOSIS — C43.59 MELANOMA OF LOWER BACK (HCC): ICD-10-CM

## 2023-09-19 PROCEDURE — 38525 BIOPSY/REMOVAL LYMPH NODES: CPT | Performed by: SURGERY

## 2023-09-19 PROCEDURE — 3430000000 HC RX DIAGNOSTIC RADIOPHARMACEUTICAL: Performed by: SURGERY

## 2023-09-19 PROCEDURE — A9520 TC99 TILMANOCEPT DIAG 0.5MCI: HCPCS | Performed by: SURGERY

## 2023-09-19 PROCEDURE — 6360000002 HC RX W HCPCS: Performed by: NURSE ANESTHETIST, CERTIFIED REGISTERED

## 2023-09-19 PROCEDURE — A4217 STERILE WATER/SALINE, 500 ML: HCPCS | Performed by: SURGERY

## 2023-09-19 PROCEDURE — 11606 EXC TR-EXT MAL+MARG >4 CM: CPT | Performed by: SURGERY

## 2023-09-19 PROCEDURE — 2580000003 HC RX 258: Performed by: ANESTHESIOLOGY

## 2023-09-19 PROCEDURE — 7100000001 HC PACU RECOVERY - ADDTL 15 MIN: Performed by: SURGERY

## 2023-09-19 PROCEDURE — 88342 IMHCHEM/IMCYTCHM 1ST ANTB: CPT

## 2023-09-19 PROCEDURE — 7100000000 HC PACU RECOVERY - FIRST 15 MIN: Performed by: SURGERY

## 2023-09-19 PROCEDURE — 88307 TISSUE EXAM BY PATHOLOGIST: CPT

## 2023-09-19 PROCEDURE — 7100000011 HC PHASE II RECOVERY - ADDTL 15 MIN: Performed by: SURGERY

## 2023-09-19 PROCEDURE — 3700000001 HC ADD 15 MINUTES (ANESTHESIA): Performed by: SURGERY

## 2023-09-19 PROCEDURE — 38900 IO MAP OF SENT LYMPH NODE: CPT | Performed by: SURGERY

## 2023-09-19 PROCEDURE — 3600000004 HC SURGERY LEVEL 4 BASE: Performed by: SURGERY

## 2023-09-19 PROCEDURE — 13101 CMPLX RPR TRUNK 2.6-7.5 CM: CPT | Performed by: SURGERY

## 2023-09-19 PROCEDURE — 3600000014 HC SURGERY LEVEL 4 ADDTL 15MIN: Performed by: SURGERY

## 2023-09-19 PROCEDURE — 13102 CMPLX RPR TRUNK ADDL 5CM/<: CPT | Performed by: SURGERY

## 2023-09-19 PROCEDURE — 2580000003 HC RX 258: Performed by: SURGERY

## 2023-09-19 PROCEDURE — 6360000002 HC RX W HCPCS: Performed by: SURGERY

## 2023-09-19 PROCEDURE — 88341 IMHCHEM/IMCYTCHM EA ADD ANTB: CPT

## 2023-09-19 PROCEDURE — 88305 TISSUE EXAM BY PATHOLOGIST: CPT

## 2023-09-19 PROCEDURE — 78195 LYMPH SYSTEM IMAGING: CPT

## 2023-09-19 PROCEDURE — 3700000000 HC ANESTHESIA ATTENDED CARE: Performed by: SURGERY

## 2023-09-19 PROCEDURE — 7100000010 HC PHASE II RECOVERY - FIRST 15 MIN: Performed by: SURGERY

## 2023-09-19 PROCEDURE — 2709999900 HC NON-CHARGEABLE SUPPLY: Performed by: SURGERY

## 2023-09-19 PROCEDURE — 38500 BIOPSY/REMOVAL LYMPH NODES: CPT | Performed by: SURGERY

## 2023-09-19 PROCEDURE — 2500000003 HC RX 250 WO HCPCS: Performed by: SURGERY

## 2023-09-19 PROCEDURE — 2500000003 HC RX 250 WO HCPCS: Performed by: NURSE ANESTHETIST, CERTIFIED REGISTERED

## 2023-09-19 RX ORDER — PROPOFOL 10 MG/ML
INJECTION, EMULSION INTRAVENOUS PRN
Status: DISCONTINUED | OUTPATIENT
Start: 2023-09-19 | End: 2023-09-19 | Stop reason: SDUPTHER

## 2023-09-19 RX ORDER — ONDANSETRON 2 MG/ML
INJECTION INTRAMUSCULAR; INTRAVENOUS PRN
Status: DISCONTINUED | OUTPATIENT
Start: 2023-09-19 | End: 2023-09-19 | Stop reason: SDUPTHER

## 2023-09-19 RX ORDER — DOCUSATE SODIUM 100 MG/1
100 CAPSULE, LIQUID FILLED ORAL 2 TIMES DAILY
Qty: 60 CAPSULE | Refills: 0 | Status: SHIPPED | OUTPATIENT
Start: 2023-09-19 | End: 2023-10-19

## 2023-09-19 RX ORDER — OXYCODONE HYDROCHLORIDE 5 MG/1
5 TABLET ORAL EVERY 6 HOURS PRN
Qty: 12 TABLET | Refills: 0 | Status: SHIPPED | OUTPATIENT
Start: 2023-09-19 | End: 2023-09-22

## 2023-09-19 RX ORDER — FENTANYL CITRATE 50 UG/ML
INJECTION, SOLUTION INTRAMUSCULAR; INTRAVENOUS PRN
Status: DISCONTINUED | OUTPATIENT
Start: 2023-09-19 | End: 2023-09-19 | Stop reason: SDUPTHER

## 2023-09-19 RX ORDER — SODIUM CHLORIDE, SODIUM LACTATE, POTASSIUM CHLORIDE, CALCIUM CHLORIDE 600; 310; 30; 20 MG/100ML; MG/100ML; MG/100ML; MG/100ML
INJECTION, SOLUTION INTRAVENOUS CONTINUOUS
Status: DISCONTINUED | OUTPATIENT
Start: 2023-09-19 | End: 2023-09-19 | Stop reason: HOSPADM

## 2023-09-19 RX ORDER — SODIUM CHLORIDE 0.9 % (FLUSH) 0.9 %
5-40 SYRINGE (ML) INJECTION EVERY 12 HOURS SCHEDULED
Status: DISCONTINUED | OUTPATIENT
Start: 2023-09-19 | End: 2023-09-19 | Stop reason: HOSPADM

## 2023-09-19 RX ORDER — ROCURONIUM BROMIDE 10 MG/ML
INJECTION, SOLUTION INTRAVENOUS PRN
Status: DISCONTINUED | OUTPATIENT
Start: 2023-09-19 | End: 2023-09-19 | Stop reason: SDUPTHER

## 2023-09-19 RX ORDER — SODIUM CHLORIDE 0.9 % (FLUSH) 0.9 %
5-40 SYRINGE (ML) INJECTION PRN
Status: DISCONTINUED | OUTPATIENT
Start: 2023-09-19 | End: 2023-09-19 | Stop reason: HOSPADM

## 2023-09-19 RX ORDER — LABETALOL HYDROCHLORIDE 5 MG/ML
10 INJECTION, SOLUTION INTRAVENOUS
Status: DISCONTINUED | OUTPATIENT
Start: 2023-09-19 | End: 2023-09-19 | Stop reason: HOSPADM

## 2023-09-19 RX ORDER — LIDOCAINE HYDROCHLORIDE 20 MG/ML
INJECTION, SOLUTION INTRAVENOUS PRN
Status: DISCONTINUED | OUTPATIENT
Start: 2023-09-19 | End: 2023-09-19 | Stop reason: SDUPTHER

## 2023-09-19 RX ORDER — DEXAMETHASONE SODIUM PHOSPHATE 4 MG/ML
INJECTION, SOLUTION INTRA-ARTICULAR; INTRALESIONAL; INTRAMUSCULAR; INTRAVENOUS; SOFT TISSUE PRN
Status: DISCONTINUED | OUTPATIENT
Start: 2023-09-19 | End: 2023-09-19 | Stop reason: SDUPTHER

## 2023-09-19 RX ORDER — SUCCINYLCHOLINE/SOD CL,ISO/PF 200MG/10ML
SYRINGE (ML) INTRAVENOUS PRN
Status: DISCONTINUED | OUTPATIENT
Start: 2023-09-19 | End: 2023-09-19 | Stop reason: SDUPTHER

## 2023-09-19 RX ORDER — FAMOTIDINE 10 MG/ML
INJECTION, SOLUTION INTRAVENOUS PRN
Status: DISCONTINUED | OUTPATIENT
Start: 2023-09-19 | End: 2023-09-19 | Stop reason: SDUPTHER

## 2023-09-19 RX ORDER — ONDANSETRON 2 MG/ML
4 INJECTION INTRAMUSCULAR; INTRAVENOUS
Status: DISCONTINUED | OUTPATIENT
Start: 2023-09-19 | End: 2023-09-19 | Stop reason: HOSPADM

## 2023-09-19 RX ORDER — HYDROMORPHONE HYDROCHLORIDE 1 MG/ML
0.5 INJECTION, SOLUTION INTRAMUSCULAR; INTRAVENOUS; SUBCUTANEOUS EVERY 5 MIN PRN
Status: DISCONTINUED | OUTPATIENT
Start: 2023-09-19 | End: 2023-09-19 | Stop reason: HOSPADM

## 2023-09-19 RX ORDER — HYDRALAZINE HYDROCHLORIDE 20 MG/ML
10 INJECTION INTRAMUSCULAR; INTRAVENOUS
Status: DISCONTINUED | OUTPATIENT
Start: 2023-09-19 | End: 2023-09-19 | Stop reason: HOSPADM

## 2023-09-19 RX ORDER — ACETAMINOPHEN 500 MG
500 TABLET ORAL 4 TIMES DAILY PRN
Qty: 20 TABLET | Refills: 0 | Status: SHIPPED | OUTPATIENT
Start: 2023-09-19 | End: 2023-09-24

## 2023-09-19 RX ORDER — PROCHLORPERAZINE EDISYLATE 5 MG/ML
5 INJECTION INTRAMUSCULAR; INTRAVENOUS
Status: DISCONTINUED | OUTPATIENT
Start: 2023-09-19 | End: 2023-09-19 | Stop reason: HOSPADM

## 2023-09-19 RX ORDER — BUPIVACAINE HYDROCHLORIDE AND EPINEPHRINE 5; 5 MG/ML; UG/ML
INJECTION, SOLUTION EPIDURAL; INTRACAUDAL; PERINEURAL PRN
Status: DISCONTINUED | OUTPATIENT
Start: 2023-09-19 | End: 2023-09-19 | Stop reason: ALTCHOICE

## 2023-09-19 RX ORDER — MEPERIDINE HYDROCHLORIDE 25 MG/ML
12.5 INJECTION INTRAMUSCULAR; INTRAVENOUS; SUBCUTANEOUS EVERY 5 MIN PRN
Status: DISCONTINUED | OUTPATIENT
Start: 2023-09-19 | End: 2023-09-19 | Stop reason: HOSPADM

## 2023-09-19 RX ORDER — OXYCODONE HYDROCHLORIDE 5 MG/1
5 TABLET ORAL
Status: DISCONTINUED | OUTPATIENT
Start: 2023-09-19 | End: 2023-09-19 | Stop reason: HOSPADM

## 2023-09-19 RX ORDER — MAGNESIUM HYDROXIDE 1200 MG/15ML
LIQUID ORAL CONTINUOUS PRN
Status: DISCONTINUED | OUTPATIENT
Start: 2023-09-19 | End: 2023-09-19 | Stop reason: HOSPADM

## 2023-09-19 RX ORDER — SODIUM CHLORIDE 9 MG/ML
INJECTION, SOLUTION INTRAVENOUS PRN
Status: DISCONTINUED | OUTPATIENT
Start: 2023-09-19 | End: 2023-09-19 | Stop reason: HOSPADM

## 2023-09-19 RX ADMIN — LIDOCAINE HYDROCHLORIDE 60 MG: 20 INJECTION, SOLUTION INTRAVENOUS at 10:03

## 2023-09-19 RX ADMIN — FENTANYL CITRATE 25 MCG: 50 INJECTION, SOLUTION INTRAMUSCULAR; INTRAVENOUS at 11:11

## 2023-09-19 RX ADMIN — PHENYLEPHRINE HYDROCHLORIDE 300 MCG: 10 INJECTION, SOLUTION INTRAMUSCULAR; INTRAVENOUS; SUBCUTANEOUS at 11:02

## 2023-09-19 RX ADMIN — CEFAZOLIN 2000 MG: 2 INJECTION, POWDER, FOR SOLUTION INTRAMUSCULAR; INTRAVENOUS at 10:02

## 2023-09-19 RX ADMIN — FENTANYL CITRATE 25 MCG: 50 INJECTION, SOLUTION INTRAMUSCULAR; INTRAVENOUS at 10:03

## 2023-09-19 RX ADMIN — SODIUM CHLORIDE, POTASSIUM CHLORIDE, SODIUM LACTATE AND CALCIUM CHLORIDE: 600; 310; 30; 20 INJECTION, SOLUTION INTRAVENOUS at 10:02

## 2023-09-19 RX ADMIN — Medication 120 MG: at 10:04

## 2023-09-19 RX ADMIN — TILMANOCEPT 800 MICRO CURIE: KIT at 08:03

## 2023-09-19 RX ADMIN — DEXAMETHASONE SODIUM PHOSPHATE 4 MG: 4 INJECTION, SOLUTION INTRAMUSCULAR; INTRAVENOUS at 10:20

## 2023-09-19 RX ADMIN — ONDANSETRON 4 MG: 2 INJECTION INTRAMUSCULAR; INTRAVENOUS at 11:39

## 2023-09-19 RX ADMIN — PROPOFOL 100 MG: 10 INJECTION, EMULSION INTRAVENOUS at 10:03

## 2023-09-19 RX ADMIN — SODIUM CHLORIDE, POTASSIUM CHLORIDE, SODIUM LACTATE AND CALCIUM CHLORIDE: 600; 310; 30; 20 INJECTION, SOLUTION INTRAVENOUS at 07:45

## 2023-09-19 RX ADMIN — FAMOTIDINE 20 MG: 10 INJECTION, SOLUTION INTRAVENOUS at 10:20

## 2023-09-19 RX ADMIN — ROCURONIUM BROMIDE 10 MG: 10 INJECTION, SOLUTION INTRAVENOUS at 10:03

## 2023-09-19 RX ADMIN — ROCURONIUM BROMIDE 20 MG: 10 INJECTION, SOLUTION INTRAVENOUS at 10:33

## 2023-09-19 ASSESSMENT — PAIN SCALES - GENERAL
PAINLEVEL_OUTOF10: 0
PAINLEVEL_OUTOF10: 0

## 2023-09-19 ASSESSMENT — PAIN - FUNCTIONAL ASSESSMENT: PAIN_FUNCTIONAL_ASSESSMENT: 0-10

## 2023-09-19 NOTE — BRIEF OP NOTE
Brief Postoperative Note      Patient: Juan Carlos Arias  YOB: 1940  MRN: [id number]    Date of Procedure: 9/19/2023    Pre-Op Diagnosis Codes:     * Melanoma of lower back ([address]) [C43.59]    Post-Op Diagnosis: Same       Procedure(s):  WIDE LOCAL EXCISION RIGHT LOWER BACK MELANOMA  RIGHT AXILLARY LYMPHNODE EXCISION; RIGHT CHEST WALL LYMPH NODE EXCISION    Surgeon(s):  Joe Lux MD    Assistant:  Resident: Michelle Ram MD    Anesthesia: General    Estimated Blood Loss (mL): Minimal    Complications: None    Specimens:   ID Type Source Tests Collected by Time Destination   A : RIGHT LOWER BACK MELANOMA, SHORT SUPERIOR, LONG LATERAL Tissue Tissue SURGICAL PATHOLOGY Joe Lux MD 9/19/2023 1037    B : RIGHT AXILLARY SENTINEL LYMPH NODE Tissue Tissue SURGICAL PATHOLOGY Joe Lux MD 9/19/2023 1132    C : RIGHT CHEST WALL SENTINEL LYMPH NODE Tissue Tissue SURGICAL PATHOLOGY Joe Lux MD 9/19/2023 1151        Implants:  * No implants in log *      Drains: * No LDAs found *    Findings: local wide excision of right lower back melanoma   Excision of right axillary sentinel lymph node and right chest wall sentinel lymph node  Wide Local Excision for Primary Cutaneous Melanoma - Excision 1 (Back)  Operation performed with curative intent Yes   Original Breslow thickness of the lesion  1.8 mm (to the tenth of a millimeter)   Clinical margin width  (measured from the edge of the lesion or the prior excision scar) 2 cm   Depth of excision Full-thickness skin/subcutaneous tissue down to fascia (melanoma)         Electronically signed by Michelle Ram MD on 9/19/2023 at 12:02 PM

## 2023-09-19 NOTE — DISCHARGE INSTRUCTIONS
Diet:   May resume regular diet    Wound Care:   Surgical grade glue was used on your incision, this will aid in the healing of your incision. It will peel off on its own within 10 days. If it does not peel off in 10 days, you may use petroleum jelly to gently remove it. Do not soak or scrub area of incision for 7-10 days. You have a dressing over your back incision. This is a layer of nonadherent gauze, normal gauze and a clear bandage / Tegaderm. This dressing should stay in place for 5 days after surgery. You may shower starting 24 hours after surgery. After 5 days, you may take the external dressing off. Activity:   No heavy lifting greater than a milk jug, or 8 lbs until follow up. Pain management: For moderate to severe pain please take the Roxicodone that you were prescribed. If you take narcotics, note that they may cause constipation. For constipation take Colace up to two times a day as needed. If stools become loose, you may reduce the amount of colace you are taking or stop taking all together. Take as little narcotic pain medication as you can tolerate. No driving while on narcotics. For mild to moderate pain you may take over-the-counter Tylenol or Motrin as directed on the packaging. Return if:   Call/ Return to ED for increased redness, worsening pain, drainage from wound, or fevers greater than 101.5 F. Important Medication Notes: You may resume your baby aspirin 48 hours after surgery. Follow up with Dr. Anyi Sanchez in 2 week(s). Please call the office to make an appointment. [address] INSTRUCTIONS    There are potential side effects of anesthesia or sedation you may experience for the first 24 hours. These side effects include:    Confusion or Memory loss, Dizziness, or Delayed Reaction Times   [x]A responsible person should be with you for the next 24 hours.   Do not operate any vehicles (automobiles, bicycles, motorcycles) or power tools or machinery for 24 hours. Do not sign any legal documents or make any legal decisions for 24 hours. Do not drink alcohol for 24 hours or while taking narcotic pain medication. Nausea    [x]Start with light diet and progress to your normal diet as you feel like eating. However, if you experience nausea or repeated episodes of vomiting which persist beyond 12-24 hours, notify your physician. Once nausea has passed, remember to keep drinking fluids. Difficulty Passing Urine  [x]Drink extra amounts of fluid today. Notify your physician if you have not urinated within 8 hours after your procedure or you feel uncomfortable. Irritated Throat from a Breathing Tube  [x]Drink extra amounts of fluid today. Lozenges may help. Muscle Aches  [x]You may experience some generalized body aches as your muscles recover from medications used to relax them during surgery. These will gradually subside. MEDICATION INSTRUCTIONS:  []Prescription(S) x     sent with you. Use as directed. When taking pain medications, you may experience the side effect of dizziness or drowsiness. Do not drink alcohol or drive when taking these medications. []Prescription(S) x          Called to Pharmacy Name and location:    [x]Give the list of your medications to your primary care physician on your next visit. Keep your med list updated and carry it with in case of emergencies. [] Narcotic pain medications can cause the side effect of significant constipation. You may want to add a stool softener to your postoperative medication schedule or speak to your surgeon on how best to manage this side effect. NARCOTIC SAFETY:  Your pain medicine is only for you to take. Safely store your medicines. Store pills up high and out of reach of children and pets.   Ensure safety caps are snapped tightly  Keep track of how many pills you have left    Unused medication can be disposed of by taking them to a

## 2023-09-19 NOTE — ANESTHESIA POSTPROCEDURE EVALUATION
Department of Anesthesiology  Postprocedure Note    Patient: Meri Cesar  MRN: [id number]  YOB: 1940  Date of evaluation: 9/19/2023      Procedure Summary     Date: 09/19/23 Room / Location: [address] [address] [facility]    Anesthesia Start: 2127 Anesthesia Stop:     Procedures:       WIDE LOCAL EXCISION RIGHT LOWER BACK MELANOMA (Right)      RIGHT AXILLARY LYMPHNODE EXCISION; RIGHT CHEST WALL LYMPH NODE EXCISION (Right) Diagnosis:       Melanoma of lower back ([address])      (Melanoma of lower back ([address]) [C43.59])    Surgeons: Luis F Crowell MD Responsible Provider: Morgan Nunez MD    Anesthesia Type: General ASA Status: 3          Anesthesia Type: General    Glo Phase I: Glo Score: 10    Glo Phase II:        Anesthesia Post Evaluation    Patient location during evaluation: PACU  Patient participation: complete - patient participated  Level of consciousness: awake and alert  Airway patency: patent  Nausea & Vomiting: no nausea and no vomiting  Complications: no  Cardiovascular status: hemodynamically stable  Respiratory status: acceptable  Hydration status: euvolemic  Multimodal analgesia pain management approach  Pain management: satisfactory to patient

## 2023-09-19 NOTE — PROGRESS NOTES
Pt arrived from OR stable no CO pain.  Bruising Bilateral orbits form fall at home R side purple and Red Left side red Report received from CRNA and RN circulator   [facility]. - Right  General  RIGHT AXILLARY LYMPHNODE EXCISION; RIGHT CHEST WALL LYMPH NODE EXCISION

## 2023-09-19 NOTE — H&P
Update History & Physical    The patient's History and Physical of September 12, 2023 was reviewed with the patient and I examined the patient. There was no change. The surgical site was confirmed by the patient and me. Encounter CC: lower back melanoma (right)    Plan: The risks, benefits, expected outcome, and alternative to the recommended procedure have been discussed with the patient. Patient understands and wants to proceed with the procedure.      Electronically signed by Juan Manuel Cui MD on 9/19/2023 at 9:27 AM

## 2023-09-27 RX ORDER — DUTASTERIDE 0.5 MG/1
0.5 CAPSULE, LIQUID FILLED ORAL DAILY
Qty: 90 CAPSULE | Refills: 1 | Status: SHIPPED | OUTPATIENT
Start: 2023-09-27

## 2023-09-27 NOTE — TELEPHONE ENCOUNTER
Medication:   Requested Prescriptions     Pending Prescriptions Disp Refills    dutasteride (AVODART) 0.5 MG capsule [Pharmacy Med Name: DUTASTERIDE 0.5MG CAPSULES] 90 capsule 1     Sig: TAKE 1 CAPSULE BY MOUTH DAILY        Last Filled:  4/3/23    Patient Phone Number: [phone] (home)     Last appt: 9/12/2023   Next appt: Visit date not found    Last OARRS:        No data to display

## 2023-10-06 ENCOUNTER — OFFICE VISIT (OUTPATIENT)
Dept: SURGERY | Age: 83
End: 2023-10-06

## 2023-10-06 VITALS
SYSTOLIC BLOOD PRESSURE: 140 MMHG | WEIGHT: 173 LBS | DIASTOLIC BLOOD PRESSURE: 86 MMHG | TEMPERATURE: 98 F | BODY MASS INDEX: 29.53 KG/M2 | HEART RATE: 83 BPM | HEIGHT: 64 IN

## 2023-10-06 DIAGNOSIS — T81.41XA INFECTION OF SUPERFICIAL INCISIONAL SURGICAL SITE AFTER PROCEDURE, INITIAL ENCOUNTER: ICD-10-CM

## 2023-10-06 DIAGNOSIS — C43.59 MALIGNANT MELANOMA OF TORSO EXCLUDING BREAST (HCC): Primary | ICD-10-CM

## 2023-10-06 DIAGNOSIS — Z48.89 ENCOUNTER FOR POST SURGICAL WOUND CHECK: ICD-10-CM

## 2023-10-06 PROCEDURE — 99024 POSTOP FOLLOW-UP VISIT: CPT | Performed by: NURSE PRACTITIONER

## 2023-10-06 RX ORDER — CEPHALEXIN 500 MG/1
500 CAPSULE ORAL 3 TIMES DAILY
Qty: 21 CAPSULE | Refills: 0 | Status: SHIPPED | OUTPATIENT
Start: 2023-10-06 | End: 2023-10-13

## 2023-10-08 NOTE — OP NOTE
and achieved. Wound was closed with subcutaneous interrupted vicryl sutures and interrupted dermal vicryl sutures. Derma flex placed. The patient tolerated the procedure well. Patient was awakened and transferred to the recovery room uneventfully. I was present for the entire procedure.     Joseph Garcia MD  Surgical Oncologist

## 2023-10-11 ENCOUNTER — TELEPHONE (OUTPATIENT)
Dept: SURGERY | Age: 83
End: 2023-10-11

## 2023-10-11 NOTE — TELEPHONE ENCOUNTER
Sylvain Ruano called in wanting to know if the patient can have a refill of his antibiotics because there looks to still be an infection     Please contact Sylvain Ruano at [phone]

## 2023-10-12 ENCOUNTER — NURSE ONLY (OUTPATIENT)
Dept: FAMILY MEDICINE CLINIC | Age: 83
End: 2023-10-12
Payer: MEDICARE

## 2023-10-12 DIAGNOSIS — Z23 IMMUNIZATION DUE: Primary | ICD-10-CM

## 2023-10-12 PROCEDURE — G0008 ADMIN INFLUENZA VIRUS VAC: HCPCS | Performed by: FAMILY MEDICINE

## 2023-10-12 PROCEDURE — 90694 VACC AIIV4 NO PRSRV 0.5ML IM: CPT | Performed by: FAMILY MEDICINE

## 2023-10-12 NOTE — PROGRESS NOTES
Vaccine Information Sheet, \"Influenza - Inactivated\"  given to Radha Bah, or parent/legal guardian of  Radha Bah and verbalized understanding. Patient responses:    Have you ever had a reaction to a flu vaccine? No  Do you have any current illness? No  Have you ever had [place] Syndrome? No  Do you have a serious allergy to any of the follow: Neomycin, Polymyxin, Thimerosal, eggs or egg products? No    Flu vaccine given per order. Please see immunization tab. Risks and benefits explained. Current VIS given.

## 2023-10-12 NOTE — TELEPHONE ENCOUNTER
Spouse states the incision under pt's arm looks good. States the incision on pt's back is what they are concerned with. Are is a little tender but when spouse pushes on incision it drains clear/slightly red liquid and feels better. Denies fever, nausea and vomiting. Patient scheduled for 10/13/23. Cannot come into the office today.

## 2023-10-13 ENCOUNTER — OFFICE VISIT (OUTPATIENT)
Dept: SURGERY | Age: 83
End: 2023-10-13

## 2023-10-13 VITALS
HEIGHT: 64 IN | SYSTOLIC BLOOD PRESSURE: 135 MMHG | TEMPERATURE: 98.4 F | WEIGHT: 173.4 LBS | DIASTOLIC BLOOD PRESSURE: 73 MMHG | HEART RATE: 81 BPM | BODY MASS INDEX: 29.6 KG/M2

## 2023-10-13 DIAGNOSIS — Z98.890 POSTOPERATIVE STATE: Primary | ICD-10-CM

## 2023-10-13 PROCEDURE — 99024 POSTOP FOLLOW-UP VISIT: CPT | Performed by: SURGERY

## 2023-11-09 ENCOUNTER — OFFICE VISIT (OUTPATIENT)
Dept: DERMATOLOGY | Age: 83
End: 2023-11-09

## 2023-11-09 DIAGNOSIS — D22.9 BENIGN NEVUS: ICD-10-CM

## 2023-11-09 DIAGNOSIS — L57.0 ACTINIC KERATOSIS TREATED WITH TOPICAL FLUOROURACIL (5FU): ICD-10-CM

## 2023-11-09 DIAGNOSIS — Z85.828 HISTORY OF NONMELANOMA SKIN CANCER: ICD-10-CM

## 2023-11-09 DIAGNOSIS — D48.5 NEOPLASM OF UNCERTAIN BEHAVIOR OF SKIN: Primary | ICD-10-CM

## 2023-11-09 DIAGNOSIS — L57.0 AK (ACTINIC KERATOSIS): ICD-10-CM

## 2023-11-09 DIAGNOSIS — Z85.820 HISTORY OF MELANOMA: ICD-10-CM

## 2023-11-09 DIAGNOSIS — C44.519 BCC (BASAL CELL CARCINOMA), BACK: ICD-10-CM

## 2023-11-09 DIAGNOSIS — L82.1 SEBORRHEIC KERATOSES: ICD-10-CM

## 2023-11-09 RX ORDER — FLUOROURACIL 50 MG/G
CREAM TOPICAL
Qty: 40 G | Refills: 0 | Status: SHIPPED | OUTPATIENT
Start: 2023-11-09

## 2023-11-09 NOTE — PROGRESS NOTES
Narrative    Not on file     Social Determinants of Health     Financial Resource Strain: Low Risk  (2/17/2023)    Overall Financial Resource Strain (CARDIA)     Difficulty of Paying Living Expenses: Not hard at all   Food Insecurity: No Food Insecurity (2/17/2023)    Hunger Vital Sign     Worried About Running Out of Food in the Last Year: Never true     Ran Out of Food in the Last Year: Never true   Transportation Needs: Unknown (2/17/2023)    PRAPARE - Transportation     Lack of Transportation (Medical): Not on file     Lack of Transportation (Non-Medical): No   Physical Activity: Insufficiently Active (8/22/2023)    Exercise Vital Sign     Days of Exercise per Week: 2 days     Minutes of Exercise per Session: 20 min   Stress: Not on file   Social Connections: Not on file   Intimate Partner Violence: Not on file   Housing Stability: Unknown (2/17/2023)    Housing Stability Vital Sign     Unable to Pay for Housing in the Last Year: Not on file     Number of [address] in the Last Year: Not on file     Unstable Housing in the Last Year: No       Physical Examination       -General: Well-appearing, NAD  1. Normal affect. Total body skin exam including scalp, face, neck, chest, abdomen, back, bilateral upper extremities, bilateral lower extremities, ocular conjunctiva, external lips, and nails was performed. Examination normal unless stated below. Underwear area not examined. .  Scattered on the trunk and extremities are multiple well-defined round and oval symmetric smoothly-bordered uniformly brown macules and papules. Multiple gritty erythematous papules over his shoulders, upper back. Almost confluent gritty erythematous papules across his forehead, temples, preauricular cheeks, more discrete papule left cheek and scattered throughout his hair and vertex of scalp. Multiple hyperkeratotic stuck on papules and plaques torso.   Well-healed scars-right lower back still with focal areas granulating          Left

## 2023-11-16 LAB — DERMATOLOGY PATHOLOGY REPORT: ABNORMAL

## 2023-12-04 PROCEDURE — 93294 REM INTERROG EVL PM/LDLS PM: CPT | Performed by: INTERNAL MEDICINE

## 2023-12-04 PROCEDURE — 93296 REM INTERROG EVL PM/IDS: CPT | Performed by: INTERNAL MEDICINE

## 2023-12-22 NOTE — PROGRESS NOTES
[facility]  [address] [place] [address], [address]  [place] [address]  Dept: [phone]  Dept Fax: [phone]      Visit Date: 1/23/2024      Subjective:     Jeff Berman is a 83 y.o. male here for follow-up on melanoma of the right lower back which was excised on 9/19/23. Patient is followed by Dr. Crespo. Patient with recent biopsy to left lower back showed basal cell carcinoma.    He is overall doing well and states he feels well today. No new major medical issues since last visit. Today he denies new subcutaneous mass, headache, bone pain, cough, abdominal pain, weight loss, jaundice or suspicious new lesions. No nodules noted. Following regularly with dermatology. Review of systems is otherwise negative    Initial Biopsy Date 9/5/23   Thickness  At Least 1.8 mm   Ulceration  Not Identified   Regression  Not Identified   Mitotic rate  3 mitosis/mm2   Stage  pT3a     Surgical Excision Date: 9/19/23  [place] Lymph Node Biopsy: Negative      Pathology 11/9/23 DIAGNOSIS:     A. LEFT LOWER PS BACK-     Basal cell carcinoma, nodular and superficial     B. LEFT NASAL BRIDGE-     Dermal nevus and pigmented seborrheic keratosis       Objective:     Vitals:    01/23/24 0918   BP: (!) 142/95   Temp: 98 °F (36.7 °C)          Constitutional: Patient is oriented to person, place, and time. No distress.   HENT:  Mucus membranes - moist. No scleral icterus.    Neck:  Normal range of motion. No visible lymphadenopathy present.    Pulmonary/Chest:  Effort normal. No respiratory distress. Bilateral symmetrical chest rise. No audible additional breath sounds.   Abdomen:  Soft, non-tender. No masses, no organomegaly.    Neurological:  Grossly intact motor and sensory systems on limited exam.   Skin: Skin is warm and dry. No rash noted. He is not diaphoretic.    Surgical site:               Incision normal: Yes              Surrounding nodularity: No              Abnormal pigmentation: No

## 2024-01-04 RX ORDER — LANOLIN ALCOHOL/MO/W.PET/CERES
325 CREAM (GRAM) TOPICAL
COMMUNITY
Start: 2012-03-22

## 2024-01-18 ENCOUNTER — TELEPHONE (OUTPATIENT)
Dept: DERMATOLOGY | Age: 84
End: 2024-01-18

## 2024-01-18 NOTE — TELEPHONE ENCOUNTER
Pt's wife is calling. She says there is a discrepancy between what was written on an appt card and the appointment that is in our system. Mrs Berman states the appt card has 1/24/24 on it. The appointment in the system says 1/22/24;. She is insisting that the appt should be changed to the 24th. There is nothing available on 1/24/24 She says that her  would not have been taking the Efudex long enough to be coming in on 1/22/24.   Please call Mrs Berman at  [phone]

## 2024-01-18 NOTE — TELEPHONE ENCOUNTER
Called and spoke to patient's wife Galilea she stated patient started using the Fluorouracil on 1/15/24 patient is supposed to apply Fluorouracil 2x a day for 10 days. Patient rescheduled for 1/25/24 at 12:30 pm.

## 2024-01-22 NOTE — PROGRESS NOTES
regular rhythm, S1&S2 and intact distal pulses.   Pulmonary/Chest: Bilateral respiratory sounds. No wheezes. No rhonchi.    Abdominal: Soft. Bowel sounds present. No distension, No tenderness.   Musculoskeletal: No tenderness. No edema    Lymphadenopathy: Has no cervical adenopathy.   Neurological: Alert and oriented. Cranial nerve appears intact, No Gross deficit   Skin: Skin is warm and dry. No rash noted.   Psychiatric: Has a normal mood, affect and behavior     Labs:  Reviewed.     ECG: reviewed, NSR    Studies:   1. 7 day CAM (1/10-1/17/23): SR with average HR 79 (), episodes of Mobitz II AVB and Mobitz I AVB, 11 runs of AT with longest lasting 5 beats and rate up to 120    14 day Zio (12/13-12/27/22): SR with average HR 75 (), 278 episodes of high grade AVB, 12 pauses up to 3.4 sec, 1 run of NSVT lasting 4 beats with rate up to 154, 55 runs of SVT with longest lasting 13 beats and rate up to 128     2. Echo 12/2/22:   Summary:   Note: Rhythm appears irregular. Consider atrial fibrillation.   Overall left ventricular ejection fraction is estimated to be 50-55%.   Right ventricle is dilated and mildly hypokinetic.   Aortic Valve leaflets appear thickened.   No aortic regurgitation is present.   No hemodynamically significant valvular aortic stenosis.   Mild aortic root dilatation.   Right atrium is dilated.   Mild tricuspid regurgitation is present.     3. Stress Test 1/9/19:    Summary    Overall findings represent a low risk scan.    There is normal isotope uptake at stress and rest. There is no evidence of    myocardial ischemia or scar.    Normal LV size and systolic function.    Non-diagnostic EKG response due to failure to reach target heart rate.         4. Cath 2012:  Angiographic Findings: Right dominant system  Left Main: Normal  Left Anterior Descending: Minimal mid luminal irregularities  Circumflex: Mild luminal irregularities  Right Coronary: Normal  Left Ventriculogram: Not

## 2024-01-23 ENCOUNTER — OFFICE VISIT (OUTPATIENT)
Dept: SURGERY | Age: 84
End: 2024-01-23
Payer: MEDICARE

## 2024-01-23 VITALS
WEIGHT: 175.4 LBS | BODY MASS INDEX: 29.94 KG/M2 | DIASTOLIC BLOOD PRESSURE: 95 MMHG | HEIGHT: 64 IN | SYSTOLIC BLOOD PRESSURE: 142 MMHG | TEMPERATURE: 98 F

## 2024-01-23 DIAGNOSIS — C43.59 MALIGNANT MELANOMA OF TORSO EXCLUDING BREAST (HCC): Primary | ICD-10-CM

## 2024-01-23 PROCEDURE — 3080F DIAST BP >= 90 MM HG: CPT | Performed by: SURGERY

## 2024-01-23 PROCEDURE — 3077F SYST BP >= 140 MM HG: CPT | Performed by: SURGERY

## 2024-01-23 PROCEDURE — 99213 OFFICE O/P EST LOW 20 MIN: CPT | Performed by: SURGERY

## 2024-01-23 PROCEDURE — 1123F ACP DISCUSS/DSCN MKR DOCD: CPT | Performed by: SURGERY

## 2024-01-25 ENCOUNTER — OFFICE VISIT (OUTPATIENT)
Dept: CARDIOLOGY CLINIC | Age: 84
End: 2024-01-25

## 2024-01-25 ENCOUNTER — OFFICE VISIT (OUTPATIENT)
Dept: DERMATOLOGY | Age: 84
End: 2024-01-25
Payer: MEDICARE

## 2024-01-25 ENCOUNTER — NURSE ONLY (OUTPATIENT)
Dept: CARDIOLOGY CLINIC | Age: 84
End: 2024-01-25

## 2024-01-25 VITALS
WEIGHT: 174 LBS | BODY MASS INDEX: 30.34 KG/M2 | SYSTOLIC BLOOD PRESSURE: 118 MMHG | HEART RATE: 70 BPM | DIASTOLIC BLOOD PRESSURE: 84 MMHG

## 2024-01-25 DIAGNOSIS — Z85.820 HISTORY OF MELANOMA: ICD-10-CM

## 2024-01-25 DIAGNOSIS — I47.29 NSVT (NONSUSTAINED VENTRICULAR TACHYCARDIA) (HCC): ICD-10-CM

## 2024-01-25 DIAGNOSIS — I35.9 AORTIC VALVE DISEASE: ICD-10-CM

## 2024-01-25 DIAGNOSIS — L57.0 ACTINIC KERATOSIS TREATED WITH TOPICAL FLUOROURACIL (5FU): Primary | ICD-10-CM

## 2024-01-25 DIAGNOSIS — I44.39 HIGH-GRADE ATRIOVENTRICULAR BLOCK: Primary | ICD-10-CM

## 2024-01-25 DIAGNOSIS — I05.9 MITRAL VALVE DISEASE: ICD-10-CM

## 2024-01-25 DIAGNOSIS — Z95.0 CARDIAC PACEMAKER IN SITU: ICD-10-CM

## 2024-01-25 DIAGNOSIS — I47.19 PAT (PAROXYSMAL ATRIAL TACHYCARDIA): ICD-10-CM

## 2024-01-25 DIAGNOSIS — Z95.0 CARDIAC PACEMAKER IN SITU: Primary | ICD-10-CM

## 2024-01-25 DIAGNOSIS — I44.2 INTERMITTENT COMPLETE HEART BLOCK (HCC): ICD-10-CM

## 2024-01-25 DIAGNOSIS — G45.9 TIA (TRANSIENT ISCHEMIC ATTACK): ICD-10-CM

## 2024-01-25 DIAGNOSIS — I45.5 SINUS PAUSE: ICD-10-CM

## 2024-01-25 DIAGNOSIS — E78.2 MIXED HYPERLIPIDEMIA: ICD-10-CM

## 2024-01-25 PROCEDURE — 1123F ACP DISCUSS/DSCN MKR DOCD: CPT | Performed by: DERMATOLOGY

## 2024-01-25 PROCEDURE — 99214 OFFICE O/P EST MOD 30 MIN: CPT | Performed by: DERMATOLOGY

## 2024-01-25 NOTE — PATIENT INSTRUCTIONS
Dr. Ascencio (sleep apnea)  [address] [facility], [place] [address]  Phone: [phone]

## 2024-01-25 NOTE — PROGRESS NOTES
Types: 1 Shots of liquor per week     Comment: 1 MARTINI DAILY, AWARE NOT TO TAKE DAY BEFORE OR DOS-DG    Drug use: Never    Sexual activity: Not on file   Other Topics Concern    Not on file   Social History Narrative    Not on file     Social Determinants of Health     Financial Resource Strain: Low Risk  (2/17/2023)    Overall Financial Resource Strain (CARDIA)     Difficulty of Paying Living Expenses: Not hard at all   Food Insecurity: Not on file (2/17/2023)   Transportation Needs: Unknown (2/17/2023)    PRAPARE - Transportation     Lack of Transportation (Medical): Not on file     Lack of Transportation (Non-Medical): No   Physical Activity: Insufficiently Active (8/22/2023)    Exercise Vital Sign     Days of Exercise per Week: 2 days     Minutes of Exercise per Session: 20 min   Stress: Not on file   Social Connections: Not on file   Intimate Partner Violence: Not on file   Housing Stability: Unknown (2/17/2023)    Housing Stability Vital Sign     Unable to Pay for Housing in the Last Year: Not on file     Number of Places Lived in the Last Year: Not on file     Unstable Housing in the Last Year: No       Physical Examination       -General: Well-appearing, NAD  1.  Well-developed well-nourished.  Erythematous crusted plaques across his upper forehead, temples, nasal dorsum, multiple crusted papules on his helices of his ears.  Mild erythema of his scalp but multiple hypertrophic actinic keratoses that have not crusted      Assessment and Plan     1. Actinic keratosis treated with topical fluorouracil (5FU)-reviewed additional time for each location and written instructions given to patient.  Discussed expectations regarding healing-Aquaphor.  Discussed improvement but that he will likely need additional treatment with liquid nitrogen at follow-up   2. History of melanoma-total-body skin exam April sooner if needed

## 2024-01-26 ENCOUNTER — TELEPHONE (OUTPATIENT)
Dept: FAMILY MEDICINE CLINIC | Age: 84
End: 2024-01-26

## 2024-01-26 DIAGNOSIS — I10 PRIMARY HYPERTENSION: ICD-10-CM

## 2024-01-26 DIAGNOSIS — R73.03 PREDIABETES: Primary | ICD-10-CM

## 2024-01-26 NOTE — TELEPHONE ENCOUNTER
Pt would like to get his blood completed by next week for their appt on 02/16/24.    Please call and advise so that pt can complete.

## 2024-02-12 ENCOUNTER — PROCEDURE VISIT (OUTPATIENT)
Dept: CARDIOLOGY CLINIC | Age: 84
End: 2024-02-12
Payer: MEDICARE

## 2024-02-12 DIAGNOSIS — I44.39 HIGH-GRADE ATRIOVENTRICULAR BLOCK: ICD-10-CM

## 2024-02-12 DIAGNOSIS — R73.03 PREDIABETES: ICD-10-CM

## 2024-02-12 DIAGNOSIS — I47.19 PAT (PAROXYSMAL ATRIAL TACHYCARDIA): ICD-10-CM

## 2024-02-12 DIAGNOSIS — I10 PRIMARY HYPERTENSION: ICD-10-CM

## 2024-02-12 DIAGNOSIS — I47.29 NSVT (NONSUSTAINED VENTRICULAR TACHYCARDIA) (HCC): ICD-10-CM

## 2024-02-12 DIAGNOSIS — Z95.0 CARDIAC PACEMAKER IN SITU: ICD-10-CM

## 2024-02-12 LAB
ALBUMIN SERPL-MCNC: 4.1 G/DL (ref 3.4–5)
ALBUMIN/GLOB SERPL: 1.6 {RATIO} (ref 1.1–2.2)
ALP SERPL-CCNC: 110 U/L (ref 40–129)
ALT SERPL-CCNC: 20 U/L (ref 10–40)
ANION GAP SERPL CALCULATED.3IONS-SCNC: 11 MMOL/L (ref 3–16)
AST SERPL-CCNC: 29 U/L (ref 15–37)
BILIRUB SERPL-MCNC: 0.8 MG/DL (ref 0–1)
BUN SERPL-MCNC: 21 MG/DL (ref 7–20)
CALCIUM SERPL-MCNC: 8.7 MG/DL (ref 8.3–10.6)
CHLORIDE SERPL-SCNC: 96 MMOL/L (ref 99–110)
CHOLEST SERPL-MCNC: 142 MG/DL (ref 0–199)
CO2 SERPL-SCNC: 24 MMOL/L (ref 21–32)
CREAT SERPL-MCNC: 1.3 MG/DL (ref 0.8–1.3)
GFR SERPLBLD CREATININE-BSD FMLA CKD-EPI: 54 ML/MIN/{1.73_M2}
GLUCOSE SERPL-MCNC: 99 MG/DL (ref 70–99)
HDLC SERPL-MCNC: 43 MG/DL (ref 40–60)
LDLC SERPL CALC-MCNC: 86 MG/DL
POTASSIUM SERPL-SCNC: 4.8 MMOL/L (ref 3.5–5.1)
PROT SERPL-MCNC: 6.7 G/DL (ref 6.4–8.2)
SODIUM SERPL-SCNC: 131 MMOL/L (ref 136–145)
TRIGL SERPL-MCNC: 63 MG/DL (ref 0–150)
VLDLC SERPL CALC-MCNC: 13 MG/DL

## 2024-02-12 PROCEDURE — 93306 TTE W/DOPPLER COMPLETE: CPT | Performed by: INTERNAL MEDICINE

## 2024-02-13 LAB
EST. AVERAGE GLUCOSE BLD GHB EST-MCNC: 131.2 MG/DL
HBA1C MFR BLD: 6.2 %

## 2024-02-16 ENCOUNTER — OFFICE VISIT (OUTPATIENT)
Dept: FAMILY MEDICINE CLINIC | Age: 84
End: 2024-02-16
Payer: MEDICARE

## 2024-02-16 VITALS
SYSTOLIC BLOOD PRESSURE: 128 MMHG | OXYGEN SATURATION: 94 % | DIASTOLIC BLOOD PRESSURE: 80 MMHG | WEIGHT: 172 LBS | HEIGHT: 64 IN | BODY MASS INDEX: 29.37 KG/M2 | HEART RATE: 70 BPM

## 2024-02-16 DIAGNOSIS — I10 PRIMARY HYPERTENSION: Primary | ICD-10-CM

## 2024-02-16 DIAGNOSIS — R73.03 PREDIABETES: ICD-10-CM

## 2024-02-16 DIAGNOSIS — R20.0 HAND NUMBNESS: ICD-10-CM

## 2024-02-16 DIAGNOSIS — G47.33 OSA ON CPAP: ICD-10-CM

## 2024-02-16 PROCEDURE — 3079F DIAST BP 80-89 MM HG: CPT | Performed by: FAMILY MEDICINE

## 2024-02-16 PROCEDURE — 3074F SYST BP LT 130 MM HG: CPT | Performed by: FAMILY MEDICINE

## 2024-02-16 PROCEDURE — 1123F ACP DISCUSS/DSCN MKR DOCD: CPT | Performed by: FAMILY MEDICINE

## 2024-02-16 PROCEDURE — 99214 OFFICE O/P EST MOD 30 MIN: CPT | Performed by: FAMILY MEDICINE

## 2024-02-16 NOTE — PROGRESS NOTES
Jeff Berman (:  1940) is a 83 y.o. male,Established patient, here for evaluation of the following chief complaint(s):  Follow-up         ASSESSMENT/PLAN:  Jeff was seen today for follow-up.    Diagnoses and all orders for this visit:    Primary hypertension  -     Lipid Panel; Future  -     Comprehensive Metabolic Panel; Future  Stable off med  Prediabetes  -     Hemoglobin A1C; Future  Stable with diet  SHAINA on CPAP  Stable on cpap  Hand numbness  -     Vitamin B12 & Folate; Future  -     CBC; Future  Reviewed supportive care and blood work ordered       No follow-ups on file.         Subjective   SUBJECTIVE/OBJECTIVE:  HPI  Pt is a of 83 y.o. male comes in today with   Chief Complaint   Patient presents with    Follow-up   Seeing cardiology, s/p pacer  Compliant with cpap  Following with derm and surg for h/o melanoma.    Review of Systems     Vitals:    24 0926   BP: 128/80   Pulse: 70   SpO2: 94%   Weight: 78 kg (172 lb)   Height: 1.626 m (5' 4\")      Objective   Physical Exam  Constitutional:       Appearance: Normal appearance.   Cardiovascular:      Rate and Rhythm: Normal rate and regular rhythm.   Pulmonary:      Effort: Pulmonary effort is normal.      Breath sounds: Normal breath sounds.   Neurological:      Mental Status: He is alert.              An electronic signature was used to authenticate this note.    --Blas Arzate MD

## 2024-02-22 ENCOUNTER — TELEPHONE (OUTPATIENT)
Dept: CARDIOLOGY CLINIC | Age: 84
End: 2024-02-22

## 2024-02-22 NOTE — TELEPHONE ENCOUNTER
Remote transmission received w stored EGMs consistent with or suggestive of Atrial Fibrillation:  * AT/AF Comstock: <0.1%  * Total number of events: 3  * Longest: 4mins 42 secs on 02.05.24    Brief PAF previously noted and addressed. Per  OV 01.25.24, \"Device detected, subclinical AF. Infrequent episodes, longest lasting 1 min.\" Pt on ASA 81mg, no BB. Report sent to  for review via Murj. Please see Murj for additional details.

## 2024-02-22 NOTE — TELEPHONE ENCOUNTER
Prior Deajaclyn Mile will be intiated tomorrow Spoke to pt's wife and arrange NPFW/Device appts for Monday, 02.26.2024, @ 330 pm.

## 2024-02-23 ENCOUNTER — TELEPHONE (OUTPATIENT)
Dept: FAMILY MEDICINE CLINIC | Age: 84
End: 2024-02-23

## 2024-02-23 DIAGNOSIS — R79.89 ELEVATED SERUM CREATININE: Primary | ICD-10-CM

## 2024-02-23 NOTE — TELEPHONE ENCOUNTER
Patient's wife is calling in asking about the patient's decreased kidney function. She states that the patient does not take any NSAIDS and drinks at least 48 or more ounces of water a day. She would like to know what the next steps should be to figure out why this is happening.

## 2024-02-23 NOTE — PROGRESS NOTES
discussed. The note was completed using EMR. Every effort was made to ensure accuracy; however, inadvertent computerized transcription errors may be present.     Patient received education regarding their diagnosis, treatment and medications while in the office today.    Carlos Senior [facility]    I  have spent 45 minutes in care of the patient including direct face to face time, chart preparation, reviewing diagnostic testing, other provider notes and coordinating patient care.

## 2024-02-23 NOTE — TELEPHONE ENCOUNTER
May just be age. Usually more workup done if continues to climb but I ordered a renal ultrasound to make sure there isn't obstruction. Can start with that and if next test worse can order more testing

## 2024-02-23 NOTE — TELEPHONE ENCOUNTER
I called the patient's wife and she was informed. They are going to call central scheduling on Monday and get the US scheduled.

## 2024-02-26 ENCOUNTER — NURSE ONLY (OUTPATIENT)
Dept: CARDIOLOGY CLINIC | Age: 84
End: 2024-02-26

## 2024-02-26 ENCOUNTER — OFFICE VISIT (OUTPATIENT)
Dept: CARDIOLOGY CLINIC | Age: 84
End: 2024-02-26
Payer: MEDICARE

## 2024-02-26 VITALS
HEART RATE: 86 BPM | DIASTOLIC BLOOD PRESSURE: 84 MMHG | SYSTOLIC BLOOD PRESSURE: 118 MMHG | BODY MASS INDEX: 29.56 KG/M2 | WEIGHT: 172.2 LBS

## 2024-02-26 DIAGNOSIS — I45.5 SINUS PAUSE: ICD-10-CM

## 2024-02-26 DIAGNOSIS — I44.39 HIGH-GRADE ATRIOVENTRICULAR BLOCK: Primary | ICD-10-CM

## 2024-02-26 DIAGNOSIS — I44.30 INCOMPLETE HEART BLOCK: ICD-10-CM

## 2024-02-26 DIAGNOSIS — Z95.0 CARDIAC PACEMAKER IN SITU: Primary | ICD-10-CM

## 2024-02-26 DIAGNOSIS — I10 ESSENTIAL HYPERTENSION: ICD-10-CM

## 2024-02-26 DIAGNOSIS — Z95.0 PACEMAKER: ICD-10-CM

## 2024-02-26 DIAGNOSIS — Z79.01 ON CONTINUOUS ORAL ANTICOAGULATION: ICD-10-CM

## 2024-02-26 DIAGNOSIS — I48.0 PAROXYSMAL ATRIAL FIBRILLATION (HCC): ICD-10-CM

## 2024-02-26 PROCEDURE — 93000 ELECTROCARDIOGRAM COMPLETE: CPT | Performed by: NURSE PRACTITIONER

## 2024-02-26 PROCEDURE — 3074F SYST BP LT 130 MM HG: CPT | Performed by: NURSE PRACTITIONER

## 2024-02-26 PROCEDURE — 1123F ACP DISCUSS/DSCN MKR DOCD: CPT | Performed by: NURSE PRACTITIONER

## 2024-02-26 PROCEDURE — 99215 OFFICE O/P EST HI 40 MIN: CPT | Performed by: NURSE PRACTITIONER

## 2024-02-26 PROCEDURE — 3078F DIAST BP <80 MM HG: CPT | Performed by: NURSE PRACTITIONER

## 2024-02-26 RX ORDER — AMOXICILLIN 500 MG/1
CAPSULE ORAL
COMMUNITY
Start: 2024-01-22

## 2024-02-26 RX ORDER — METOPROLOL SUCCINATE 25 MG/1
12.5 TABLET, EXTENDED RELEASE ORAL DAILY
Qty: 30 TABLET | Refills: 5 | Status: SHIPPED | OUTPATIENT
Start: 2024-02-26 | End: 2024-02-27 | Stop reason: SDUPTHER

## 2024-02-27 ENCOUNTER — HOSPITAL ENCOUNTER (OUTPATIENT)
Dept: ULTRASOUND IMAGING | Age: 84
Discharge: HOME | End: 2024-02-27
Payer: MEDICARE

## 2024-02-27 DIAGNOSIS — R79.89 ELEVATED SERUM CREATININE: ICD-10-CM

## 2024-02-27 PROCEDURE — 76770 US EXAM ABDO BACK WALL COMP: CPT

## 2024-02-27 RX ORDER — METOPROLOL SUCCINATE 25 MG/1
12.5 TABLET, EXTENDED RELEASE ORAL DAILY
Qty: 45 TABLET | Refills: 3 | Status: SHIPPED | OUTPATIENT
Start: 2024-02-27

## 2024-02-27 NOTE — TELEPHONE ENCOUNTER
Requested Prescriptions     Pending Prescriptions Disp Refills    metoprolol succinate (TOPROL XL) 25 MG extended release tablet 45 tablet 3     Sig: Take 0.5 tablets by mouth daily          Last Office Visit: 2/26/2024     Next Office Visit: 7/25/2024         Last Labs: 02.12.2024

## 2024-03-18 RX ORDER — TAMSULOSIN HYDROCHLORIDE 0.4 MG/1
0.8 CAPSULE ORAL DAILY
Qty: 180 CAPSULE | Refills: 3 | Status: SHIPPED | OUTPATIENT
Start: 2024-03-18

## 2024-03-18 NOTE — TELEPHONE ENCOUNTER
Medication:   Requested Prescriptions     Pending Prescriptions Disp Refills    tamsulosin (FLOMAX) 0.4 MG capsule [Pharmacy Med Name: TAMSULOSIN 0.4MG CAPSULES] 180 capsule 3     Sig: TAKE 2 CAPSULES BY MOUTH DAILY        Last Filled:  8/15/23    Patient Phone Number: [phone] (home)     Last appt: 2/16/2024   Next appt: 8/16/2024    Last OARRS:        No data to display

## 2024-03-25 RX ORDER — DUTASTERIDE 0.5 MG/1
0.5 CAPSULE, LIQUID FILLED ORAL DAILY
Qty: 90 CAPSULE | Refills: 1 | Status: SHIPPED | OUTPATIENT
Start: 2024-03-25

## 2024-03-25 NOTE — TELEPHONE ENCOUNTER
Medication:   Requested Prescriptions     Pending Prescriptions Disp Refills    dutasteride (AVODART) 0.5 MG capsule [Pharmacy Med Name: DUTASTERIDE 0.5MG CAPSULES] 90 capsule 1     Sig: TAKE 1 CAPSULE BY MOUTH DAILY        Last Filled:      Patient Phone Number: [phone] (home)     Last appt: 2/16/2024   Next appt: 8/16/2024    Last OARRS:        No data to display

## 2024-04-24 ENCOUNTER — OFFICE VISIT (OUTPATIENT)
Dept: DERMATOLOGY | Age: 84
End: 2024-04-24
Payer: MEDICARE

## 2024-04-24 DIAGNOSIS — C44.519 BCC (BASAL CELL CARCINOMA), CHEST: ICD-10-CM

## 2024-04-24 DIAGNOSIS — L57.0 AK (ACTINIC KERATOSIS): Primary | ICD-10-CM

## 2024-04-24 DIAGNOSIS — D22.9 BENIGN NEVUS: ICD-10-CM

## 2024-04-24 DIAGNOSIS — Z85.828 HISTORY OF NONMELANOMA SKIN CANCER: ICD-10-CM

## 2024-04-24 DIAGNOSIS — L57.8 DIFFUSE PHOTODAMAGE OF SKIN: ICD-10-CM

## 2024-04-24 DIAGNOSIS — Z85.820 HISTORY OF MELANOMA: ICD-10-CM

## 2024-04-24 PROCEDURE — 17262 DSTRJ MAL LES T/A/L 1.1-2.0: CPT | Performed by: DERMATOLOGY

## 2024-04-24 PROCEDURE — 99213 OFFICE O/P EST LOW 20 MIN: CPT | Performed by: DERMATOLOGY

## 2024-04-24 PROCEDURE — 17003 DESTRUCT PREMALG LES 2-14: CPT | Performed by: DERMATOLOGY

## 2024-04-24 PROCEDURE — 17000 DESTRUCT PREMALG LESION: CPT | Performed by: DERMATOLOGY

## 2024-04-24 PROCEDURE — 1123F ACP DISCUSS/DSCN MKR DOCD: CPT | Performed by: DERMATOLOGY

## 2024-04-24 NOTE — PROGRESS NOTES
[facility]  Estella Crespo M.D.  [phone]       Jeff Berman  1940    83 y.o. male     Date of Visit: 4/24/2024    Chief Complaint: No chief complaint on file.       I was asked to see this patient by Dr. Devi ref. provider found.    History of Present Illness:  1. TBSE    Multiple nevi. Patient has not noticed any new or changing pigmented lesions.   Stable in size, shape, color. Not itching, bleeding.     Photodamage.  Progressive freckling and lentigines of sun exposed areas.  Patient is wearing hats and sunscreen consistently.     Patient has not noticed any new or changing lesions.    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 + ABX prophy     + Pacemaker     9/23 right lower back malignant melanoma Breslow depth 1.8 with a mitotic rate of 3, no ulceration status post wide local excision with negative sentinel lymph nodes x3  11/23 left lower paraspinal back nodular and superficial basal cell carcinoma status post curettage  1/24 fluorouracil face-forehead, temples, nasal dorsum, ears, scalp    Review of Systems:  Constitutional: Reports general sense of well-being       Past Medical History, Surgical History, Family History, Medications and Allergies reviewed.    Social History:   Social History     Socioeconomic History    Marital status:      Spouse name: Not on file    Number of children: Not on file    Years of education: Not on file    Highest education level: Not on file   Occupational History    Not on file   Tobacco Use    Smoking status: Never    Smokeless tobacco: Never    Tobacco comments:     DENIES SMOKING/VAPING,REC DRUGS, AWARE NOT TO SMOKE DAY BEFORE OR DOS-DG   Substance and Sexual Activity    Alcohol use: Yes     Alcohol/week: 1.0 standard drink of alcohol     Types: 1 Shots of liquor per week     Comment: 1 MARTINI DAILY, AWARE NOT TO TAKE DAY BEFORE OR DOS-DG    Drug use: Never

## 2024-04-29 ENCOUNTER — PATIENT MESSAGE (OUTPATIENT)
Dept: DERMATOLOGY | Age: 84
End: 2024-04-29

## 2024-04-29 LAB — DERMATOLOGY PATHOLOGY REPORT: ABNORMAL

## 2024-06-16 PROCEDURE — 99284 EMERGENCY DEPT VISIT MOD MDM: CPT

## 2024-06-16 PROCEDURE — 96361 HYDRATE IV INFUSION ADD-ON: CPT

## 2024-06-17 ENCOUNTER — TELEPHONE (OUTPATIENT)
Dept: FAMILY MEDICINE CLINIC | Age: 84
End: 2024-06-17

## 2024-06-17 ENCOUNTER — HOSPITAL ENCOUNTER (EMERGENCY)
Age: 84
Discharge: HOME | End: 2024-06-17
Attending: EMERGENCY MEDICINE
Payer: MEDICARE

## 2024-06-17 ENCOUNTER — HOSPITAL ENCOUNTER (EMERGENCY)
Age: 84
Discharge: HOME | End: 2024-06-17
Attending: STUDENT IN AN ORGANIZED HEALTH CARE EDUCATION/TRAINING PROGRAM
Payer: MEDICARE

## 2024-06-17 VITALS
HEART RATE: 95 BPM | TEMPERATURE: 97.6 F | HEIGHT: 64 IN | WEIGHT: 173.2 LBS | SYSTOLIC BLOOD PRESSURE: 111 MMHG | OXYGEN SATURATION: 93 % | RESPIRATION RATE: 20 BRPM | DIASTOLIC BLOOD PRESSURE: 89 MMHG | BODY MASS INDEX: 29.57 KG/M2

## 2024-06-17 VITALS
TEMPERATURE: 98.5 F | SYSTOLIC BLOOD PRESSURE: 150 MMHG | HEIGHT: 64 IN | BODY MASS INDEX: 29.94 KG/M2 | OXYGEN SATURATION: 91 % | RESPIRATION RATE: 18 BRPM | HEART RATE: 69 BPM | DIASTOLIC BLOOD PRESSURE: 99 MMHG | WEIGHT: 175.4 LBS

## 2024-06-17 DIAGNOSIS — E86.0 DEHYDRATION: Primary | ICD-10-CM

## 2024-06-17 DIAGNOSIS — R11.2 NAUSEA VOMITING AND DIARRHEA: ICD-10-CM

## 2024-06-17 DIAGNOSIS — R11.2 NAUSEA AND VOMITING, UNSPECIFIED VOMITING TYPE: Primary | ICD-10-CM

## 2024-06-17 DIAGNOSIS — R19.7 NAUSEA VOMITING AND DIARRHEA: ICD-10-CM

## 2024-06-17 LAB
ALBUMIN SERPL-MCNC: 4 G/DL (ref 3.4–5)
ALBUMIN SERPL-MCNC: 4.1 G/DL (ref 3.4–5)
ALP SERPL-CCNC: 99 U/L (ref 40–129)
ALP SERPL-CCNC: 99 U/L (ref 40–129)
ALT SERPL-CCNC: 23 U/L (ref 10–40)
ALT SERPL-CCNC: 24 U/L (ref 10–40)
ANION GAP SERPL CALCULATED.3IONS-SCNC: 12 MMOL/L (ref 3–16)
ANION GAP SERPL CALCULATED.3IONS-SCNC: 16 MMOL/L (ref 3–16)
AST SERPL-CCNC: 36 U/L (ref 15–37)
AST SERPL-CCNC: 39 U/L (ref 15–37)
BASOPHILS # BLD: 0 K/UL (ref 0–0.2)
BASOPHILS # BLD: 0 K/UL (ref 0–0.2)
BASOPHILS NFR BLD: 0.1 %
BASOPHILS NFR BLD: 0.2 %
BILIRUB DIRECT SERPL-MCNC: 0.3 MG/DL (ref 0–0.3)
BILIRUB DIRECT SERPL-MCNC: 0.4 MG/DL (ref 0–0.3)
BILIRUB INDIRECT SERPL-MCNC: 1 MG/DL (ref 0–1)
BILIRUB INDIRECT SERPL-MCNC: 1.8 MG/DL (ref 0–1)
BILIRUB SERPL-MCNC: 1.3 MG/DL (ref 0–1)
BILIRUB SERPL-MCNC: 2.2 MG/DL (ref 0–1)
BUN SERPL-MCNC: 21 MG/DL (ref 7–20)
BUN SERPL-MCNC: 22 MG/DL (ref 7–20)
CALCIUM SERPL-MCNC: 9.4 MG/DL (ref 8.3–10.6)
CALCIUM SERPL-MCNC: 9.5 MG/DL (ref 8.3–10.6)
CHLORIDE SERPL-SCNC: 92 MMOL/L (ref 99–110)
CHLORIDE SERPL-SCNC: 96 MMOL/L (ref 99–110)
CO2 SERPL-SCNC: 18 MMOL/L (ref 21–32)
CO2 SERPL-SCNC: 25 MMOL/L (ref 21–32)
CREAT SERPL-MCNC: 0.9 MG/DL (ref 0.8–1.3)
CREAT SERPL-MCNC: 1 MG/DL (ref 0.8–1.3)
DEPRECATED RDW RBC AUTO: 15.6 % (ref 12.4–15.4)
DEPRECATED RDW RBC AUTO: 16.1 % (ref 12.4–15.4)
EOSINOPHIL # BLD: 0 K/UL (ref 0–0.6)
EOSINOPHIL # BLD: 0 K/UL (ref 0–0.6)
EOSINOPHIL NFR BLD: 0.1 %
EOSINOPHIL NFR BLD: 0.2 %
GFR SERPLBLD CREATININE-BSD FMLA CKD-EPI: 74 ML/MIN/{1.73_M2}
GFR SERPLBLD CREATININE-BSD FMLA CKD-EPI: 83 ML/MIN/{1.73_M2}
GLUCOSE SERPL-MCNC: 114 MG/DL (ref 70–99)
GLUCOSE SERPL-MCNC: 125 MG/DL (ref 70–99)
HCT VFR BLD AUTO: 52.2 % (ref 40.5–52.5)
HCT VFR BLD AUTO: 53.4 % (ref 40.5–52.5)
HGB BLD-MCNC: 16.9 G/DL (ref 13.5–17.5)
HGB BLD-MCNC: 17.3 G/DL (ref 13.5–17.5)
LIPASE SERPL-CCNC: 17 U/L (ref 13–60)
LIPASE SERPL-CCNC: 38 U/L (ref 13–60)
LYMPHOCYTES # BLD: 0.6 K/UL (ref 1–5.1)
LYMPHOCYTES # BLD: 0.7 K/UL (ref 1–5.1)
LYMPHOCYTES NFR BLD: 4 %
LYMPHOCYTES NFR BLD: 6.8 %
MCH RBC QN AUTO: 28.9 PG (ref 26–34)
MCH RBC QN AUTO: 29 PG (ref 26–34)
MCHC RBC AUTO-ENTMCNC: 32.3 G/DL (ref 31–36)
MCHC RBC AUTO-ENTMCNC: 32.5 G/DL (ref 31–36)
MCV RBC AUTO: 89 FL (ref 80–100)
MCV RBC AUTO: 89.6 FL (ref 80–100)
MONOCYTES # BLD: 0.8 K/UL (ref 0–1.3)
MONOCYTES # BLD: 1.6 K/UL (ref 0–1.3)
MONOCYTES NFR BLD: 10.9 %
MONOCYTES NFR BLD: 8 %
NEUTROPHILS # BLD: 12.1 K/UL (ref 1.7–7.7)
NEUTROPHILS # BLD: 8.9 K/UL (ref 1.7–7.7)
NEUTROPHILS NFR BLD: 84.7 %
NEUTROPHILS NFR BLD: 85 %
PLATELET # BLD AUTO: 228 K/UL (ref 135–450)
PLATELET # BLD AUTO: 267 K/UL (ref 135–450)
PMV BLD AUTO: 7.2 FL (ref 5–10.5)
PMV BLD AUTO: 7.5 FL (ref 5–10.5)
POTASSIUM SERPL-SCNC: 4.5 MMOL/L (ref 3.5–5.1)
POTASSIUM SERPL-SCNC: 4.9 MMOL/L (ref 3.5–5.1)
PROT SERPL-MCNC: 7.6 G/DL (ref 6.4–8.2)
PROT SERPL-MCNC: 7.6 G/DL (ref 6.4–8.2)
RBC # BLD AUTO: 5.83 M/UL (ref 4.2–5.9)
RBC # BLD AUTO: 5.99 M/UL (ref 4.2–5.9)
SODIUM SERPL-SCNC: 129 MMOL/L (ref 136–145)
SODIUM SERPL-SCNC: 130 MMOL/L (ref 136–145)
WBC # BLD AUTO: 10.5 K/UL (ref 4–11)
WBC # BLD AUTO: 14.3 K/UL (ref 4–11)

## 2024-06-17 PROCEDURE — 85025 COMPLETE CBC W/AUTO DIFF WBC: CPT

## 2024-06-17 PROCEDURE — 96376 TX/PRO/DX INJ SAME DRUG ADON: CPT

## 2024-06-17 PROCEDURE — 96361 HYDRATE IV INFUSION ADD-ON: CPT

## 2024-06-17 PROCEDURE — 2500000003 HC RX 250 WO HCPCS: Performed by: STUDENT IN AN ORGANIZED HEALTH CARE EDUCATION/TRAINING PROGRAM

## 2024-06-17 PROCEDURE — 80048 BASIC METABOLIC PNL TOTAL CA: CPT

## 2024-06-17 PROCEDURE — 96374 THER/PROPH/DIAG INJ IV PUSH: CPT

## 2024-06-17 PROCEDURE — 2580000003 HC RX 258: Performed by: STUDENT IN AN ORGANIZED HEALTH CARE EDUCATION/TRAINING PROGRAM

## 2024-06-17 PROCEDURE — 99284 EMERGENCY DEPT VISIT MOD MDM: CPT

## 2024-06-17 PROCEDURE — 2580000003 HC RX 258: Performed by: EMERGENCY MEDICINE

## 2024-06-17 PROCEDURE — 6360000002 HC RX W HCPCS: Performed by: STUDENT IN AN ORGANIZED HEALTH CARE EDUCATION/TRAINING PROGRAM

## 2024-06-17 PROCEDURE — 83690 ASSAY OF LIPASE: CPT

## 2024-06-17 PROCEDURE — 96375 TX/PRO/DX INJ NEW DRUG ADDON: CPT

## 2024-06-17 PROCEDURE — 80076 HEPATIC FUNCTION PANEL: CPT

## 2024-06-17 PROCEDURE — 6360000002 HC RX W HCPCS: Performed by: EMERGENCY MEDICINE

## 2024-06-17 RX ORDER — SODIUM CHLORIDE, SODIUM LACTATE, POTASSIUM CHLORIDE, AND CALCIUM CHLORIDE .6; .31; .03; .02 G/100ML; G/100ML; G/100ML; G/100ML
1000 INJECTION, SOLUTION INTRAVENOUS ONCE
Status: COMPLETED | OUTPATIENT
Start: 2024-06-17 | End: 2024-06-17

## 2024-06-17 RX ORDER — ONDANSETRON 4 MG/1
4 TABLET, FILM COATED ORAL EVERY 8 HOURS PRN
Qty: 20 TABLET | Refills: 0 | Status: SHIPPED | OUTPATIENT
Start: 2024-06-17

## 2024-06-17 RX ORDER — ONDANSETRON 2 MG/ML
4 INJECTION INTRAMUSCULAR; INTRAVENOUS ONCE
Status: COMPLETED | OUTPATIENT
Start: 2024-06-17 | End: 2024-06-17

## 2024-06-17 RX ADMIN — ONDANSETRON 4 MG: 2 INJECTION INTRAMUSCULAR; INTRAVENOUS at 18:01

## 2024-06-17 RX ADMIN — SODIUM CHLORIDE, POTASSIUM CHLORIDE, SODIUM LACTATE AND CALCIUM CHLORIDE 1000 ML: 600; 310; 30; 20 INJECTION, SOLUTION INTRAVENOUS at 00:42

## 2024-06-17 RX ADMIN — ONDANSETRON 4 MG: 2 INJECTION INTRAMUSCULAR; INTRAVENOUS at 00:42

## 2024-06-17 RX ADMIN — ONDANSETRON 4 MG: 2 INJECTION INTRAMUSCULAR; INTRAVENOUS at 01:50

## 2024-06-17 RX ADMIN — FAMOTIDINE 20 MG: 10 INJECTION, SOLUTION INTRAVENOUS at 17:59

## 2024-06-17 RX ADMIN — SODIUM CHLORIDE, POTASSIUM CHLORIDE, SODIUM LACTATE AND CALCIUM CHLORIDE 1000 ML: 600; 310; 30; 20 INJECTION, SOLUTION INTRAVENOUS at 17:58

## 2024-06-17 ASSESSMENT — ENCOUNTER SYMPTOMS
NAUSEA: 1
SHORTNESS OF BREATH: 0
VOMITING: 1
ABDOMINAL PAIN: 1
COUGH: 0

## 2024-06-17 ASSESSMENT — PAIN DESCRIPTION - DESCRIPTORS: DESCRIPTORS: CRAMPING

## 2024-06-17 ASSESSMENT — LIFESTYLE VARIABLES
HOW MANY STANDARD DRINKS CONTAINING ALCOHOL DO YOU HAVE ON A TYPICAL DAY: 1 OR 2
HOW OFTEN DO YOU HAVE A DRINK CONTAINING ALCOHOL: 4 OR MORE TIMES A WEEK
HOW MANY STANDARD DRINKS CONTAINING ALCOHOL DO YOU HAVE ON A TYPICAL DAY: 1 OR 2
HOW OFTEN DO YOU HAVE A DRINK CONTAINING ALCOHOL: MONTHLY OR LESS

## 2024-06-17 ASSESSMENT — PAIN DESCRIPTION - LOCATION
LOCATION: ABDOMEN
LOCATION: ABDOMEN

## 2024-06-17 ASSESSMENT — PAIN SCALES - GENERAL
PAINLEVEL_OUTOF10: 5
PAINLEVEL_OUTOF10: 6

## 2024-06-17 ASSESSMENT — PAIN - FUNCTIONAL ASSESSMENT: PAIN_FUNCTIONAL_ASSESSMENT: 0-10

## 2024-06-17 NOTE — ED NOTES
Patient prepared for and ready to be discharged. Dressed in clothes and given belongings.  IV removed, pt tolerated well, no complications.  Patient discharged at this time in no acute distress after pt verbalized understanding of discharge instructions.   Reviewed medications, and when to return to the ED with patient. Encouraged follow up with PCP  Patient walked to lobby, Family to take patient home.      Escuadra, Marylee, RN  06/17/24 0219

## 2024-06-17 NOTE — ED NOTES
Patient prepared for and ready to be discharged. Patient discharged at this time in no acute distress after verbalizing understanding of discharge instructions. Patient left after receiving After Visit Summary instructions.        Angus Rock RN  06/17/24 0414

## 2024-06-17 NOTE — ED PROVIDER NOTES
THE [facility]  EMERGENCY DEPARTMENT ENCOUNTER          ATTENDING PHYSICIAN NOTE       Date of evaluation: 6/17/2024    Chief Complaint     Emesis (Pt complains of n/v/d that occurred after eating seafood last night. Pt was seen here last night and pt was given zofran and pt states it has not been working. )      History of Present Illness     Jeff Berman is a 83 y.o. male who presents with his wife with complaint of nausea, vomiting, diarrhea and abdominal pain.  Patient states that he ate at [employer] yesterday and he had mussels, and several hours after this developed nausea, vomiting and diarrhea.  He came to the emergency room yesterday and was sent home with a prescription for Zofran.  This morning, he was able to eat a normal breakfast.  He took a Zofran about noon.  He ate a small sandwich for lunch and then a few hours later had recurrent vomiting.  He reports about 6 episodes of nausea and vomiting.  He now endorses some abdominal discomfort as well.     ASSESSMENT / PLAN  (MEDICAL DECISION MAKING)     INITIAL VITALS: BP: (!) 153/101, Temp: 98.5 °F (36.9 °C), Pulse: 64, Respirations: 18, SpO2: 92 %      Jeff Berman is a 83 y.o. male with history of HTN, HLD, TIA and colon cancer who presents with nausea, vomiting, diarrhea and abdominal pain.  He was seen here last night with suspected food poisoning.  His symptoms came shortly after eating mussells at a restaurant.  He was feeling better this morning, was able to tolerate breakfast, but this afternoon had several episodes of nausea, vomiting and development of abdominal pain.  On arrival, patient is awake, alert and in no acute distress.  He has a benign abdomen on palpation.  He has no focal neurologic deficits.  I do not think he requires cross-sectional imaging given his benign exam.  I repeated blood work that is unchanged from prior and shows no significant acidosis or metabolic derangement.  He was given IV fluids, Pepcid and  No wheezing/clear to mild end expiratory wheeze or scattered expiratory wheeze

## 2024-06-17 NOTE — DISCHARGE INSTRUCTIONS
We gave Jeff a dose of IV zofran (ondansetron) and IV pepcid (famotidine). You can use the oral zofran at home and can  pepcid (famotidine) over the counter. This medication is an antacid.

## 2024-06-17 NOTE — ED PROVIDER NOTES
- 1.3 K/uL    Eosinophils Absolute 0.0 0.0 - 0.6 K/uL    Basophils Absolute 0.0 0.0 - 0.2 K/uL   Hepatic Function Panel   Result Value Ref Range    Total Protein 7.6 6.4 - 8.2 g/dL    Albumin 4.0 3.4 - 5.0 g/dL    Alkaline Phosphatase 99 40 - 129 U/L    ALT 24 10 - 40 U/L    AST 39 (H) 15 - 37 U/L    Total Bilirubin 1.3 (H) 0.0 - 1.0 mg/dL    Bilirubin, Direct 0.3 0.0 - 0.3 mg/dL    Bilirubin, Indirect 1.0 0.0 - 1.0 mg/dL   Lipase   Result Value Ref Range    Lipase 38.0 13.0 - 60.0 U/L       RECENT VITALS:  BP: 111/89,Temp: 97.6 °F (36.4 °C), Pulse: 95, Respirations: 20, SpO2: 93 %     Procedures         ED Course     Nursing Notes, Past Medical Hx, Past Surgical Hx, Social Hx,Allergies, and Family Hx were reviewed.    patient was given the following medications:  Orders Placed This Encounter   Medications    lactated ringers bolus 1,000 mL    ondansetron (ZOFRAN) injection 4 mg    ondansetron (ZOFRAN) 4 MG tablet     Sig: Take 1 tablet by mouth every 8 hours as needed for Nausea     Dispense:  20 tablet     Refill:  0    ondansetron (ZOFRAN) injection 4 mg       CONSULTS:  None    MEDICAL DECISIONMAKING / ASSESSMENT / PLAN     Jeff Berman is a 83 y.o. male who presents to the emergency department today complaining of vomiting and diarrhea.  On arrival the patient is hemodynamically stable he is clinically well-appearing in no distress.  Abdominal exam is quite benign, sudden onset of symptoms and benign character of his symptoms seem consistent most with viral GI illness versus food poisoning.  Patient was given Zofran he was given IV fluids some screening labs were sent.  Labs are notable for mild leukocytosis mild findings consistent with dehydration and diarrhea.  Patient was reexamined his abdomen remains benign, he was able to tolerate a p.o. challenge.   Patient and family informed about their diagnosis and plan. They were counseled about home care instructions, return precautions, need for follow-up and

## 2024-06-17 NOTE — TELEPHONE ENCOUNTER
Pt  went to [employer] last night and got food poisoning he had eaten some muscles.    Pt went to ER [facility] last night. He was given a nausea medication Ondansetron 4 mg tablet 1 every 8 hrs as needed. He took one dose.  He tried to eat some food this morning and this afternoon and the food and water is not staying down.   Pt wife asking if he should cont. the nausea med or go back to the ER.  Please call to advise

## 2024-06-19 ENCOUNTER — TELEPHONE (OUTPATIENT)
Dept: FAMILY MEDICINE CLINIC | Age: 84
End: 2024-06-19

## 2024-06-19 NOTE — TELEPHONE ENCOUNTER
As of today, the pt has not had any diarrhea.  His last BM was yesterday evening.  Please call pt if something changes.

## 2024-06-19 NOTE — TELEPHONE ENCOUNTER
Pt Hospitalized on 06/17/24 for nausea and vomiting.     Shell Fish Poisoning.   Symptoms:    Abdominal pain (soreness and ache)  Body ache  Really sore to the the touch from possibly all of the vomiting  Diarrhea, which has slow down, as of yesterday.  Twice in the morning and again last night.  Nausea has subsided    Pt currently taking OTC Pepcid, 10 mg.  One, per every 12 hours.  Pt feels like it kind of helps.     Pt would also like to know if he can be prescribed Imodium AD, or if it's ok to pick it up from the store.    If possible, please send something stronger for the pt's stomach issues to [facility] in [place].     NOV - 06/21/24  HFU - 07/01/24

## 2024-06-28 ENCOUNTER — TELEPHONE (OUTPATIENT)
Dept: CARDIOLOGY CLINIC | Age: 84
End: 2024-06-28

## 2024-06-28 NOTE — TELEPHONE ENCOUNTER
Pt 's spouse called to report that pt's light on his device turned orange last night, however this morning it was back to green. Pt has not been experiencing any symptoms so they are unsure what that means.     [phone]

## 2024-06-28 NOTE — TELEPHONE ENCOUNTER
Monitor appears connected and communicating.  I have not received any disconnect notice.  As long as the orange light is temporary and green light is restore, no further action required.  Occasionally, monitor might  experience a cellular or wifi disruption and sometimes system is busy.  He is good for now.

## 2024-07-01 ENCOUNTER — OFFICE VISIT (OUTPATIENT)
Dept: FAMILY MEDICINE CLINIC | Age: 84
End: 2024-07-01
Payer: MEDICARE

## 2024-07-01 VITALS
HEART RATE: 93 BPM | OXYGEN SATURATION: 97 % | WEIGHT: 171 LBS | HEIGHT: 64 IN | SYSTOLIC BLOOD PRESSURE: 130 MMHG | BODY MASS INDEX: 29.19 KG/M2 | DIASTOLIC BLOOD PRESSURE: 72 MMHG

## 2024-07-01 DIAGNOSIS — R11.2 NAUSEA AND VOMITING, UNSPECIFIED VOMITING TYPE: Primary | ICD-10-CM

## 2024-07-01 PROCEDURE — 3075F SYST BP GE 130 - 139MM HG: CPT | Performed by: FAMILY MEDICINE

## 2024-07-01 PROCEDURE — 1123F ACP DISCUSS/DSCN MKR DOCD: CPT | Performed by: FAMILY MEDICINE

## 2024-07-01 PROCEDURE — 3078F DIAST BP <80 MM HG: CPT | Performed by: FAMILY MEDICINE

## 2024-07-01 PROCEDURE — 99212 OFFICE O/P EST SF 10 MIN: CPT | Performed by: FAMILY MEDICINE

## 2024-07-01 RX ORDER — TAMSULOSIN HYDROCHLORIDE 0.4 MG/1
0.8 CAPSULE ORAL DAILY
Qty: 180 CAPSULE | Refills: 3 | Status: SHIPPED | OUTPATIENT
Start: 2024-07-01

## 2024-07-01 SDOH — ECONOMIC STABILITY: INCOME INSECURITY: HOW HARD IS IT FOR YOU TO PAY FOR THE VERY BASICS LIKE FOOD, HOUSING, MEDICAL CARE, AND HEATING?: NOT HARD AT ALL

## 2024-07-01 SDOH — ECONOMIC STABILITY: FOOD INSECURITY: WITHIN THE PAST 12 MONTHS, YOU WORRIED THAT YOUR FOOD WOULD RUN OUT BEFORE YOU GOT MONEY TO BUY MORE.: NEVER TRUE

## 2024-07-01 SDOH — ECONOMIC STABILITY: FOOD INSECURITY: WITHIN THE PAST 12 MONTHS, THE FOOD YOU BOUGHT JUST DIDN'T LAST AND YOU DIDN'T HAVE MONEY TO GET MORE.: NEVER TRUE

## 2024-07-01 NOTE — PROGRESS NOTES
Jeff Berman (:  1940) is a 83 y.o. male,Established patient, here for evaluation of the following chief complaint(s):  Follow-Up from Hospital      Assessment & Plan   ASSESSMENT/PLAN:  Jeff was seen today for follow-up from hospital.    Diagnoses and all orders for this visit:    Nausea and vomiting, unspecified vomiting type    Other orders  -     tamsulosin (FLOMAX) 0.4 MG capsule; Take 2 capsules by mouth daily    Symptoms resolved. blood work from hospital was stable and back to baseline     No follow-ups on file.         Subjective   SUBJECTIVE/OBJECTIVE:  HPI  Pt is a of 83 y.o. male comes in today with   Chief Complaint   Patient presents with    Follow-Up from Hospital     Got really sick after eating mussels.  Took a week to recover and 2 er visits.      Vitals:    24 1141   BP: 130/72   Pulse: 93   SpO2: 97%   Weight: 77.6 kg (171 lb)   Height: 1.626 m (5' 4\")     Past Medical History:Reviewed  Medications:Reviewed.  Allergies   Allergen Reactions    Demerol Nausea And Vomiting    Meperidine Nausea And Vomiting    Oxycodone Hcl Nausea And Vomiting      Social hx:Reviewed.  Social History     Tobacco Use   Smoking Status Never   Smokeless Tobacco Never   Tobacco Comments    DENIES SMOKING/VAPING,REC DRUGS, AWARE NOT TO SMOKE DAY BEFORE OR DOS-DG        Review of Systems   Constitutional:  Negative for chills and fever.          Objective   Physical Exam  Constitutional:       Appearance: Normal appearance.   Neurological:      Mental Status: He is alert.              An electronic signature was used to authenticate this note.    --Blas Arzate MD

## 2024-07-09 NOTE — PROGRESS NOTES
hypertrophy of the basal septum. Left ventricular function is normal  with ejection fraction estimated at 55-60%. Normal diastolic function.  Mitral valve repair noted. Trivial aortic regurgitation is present.  The right ventricle is mildly enlarged. Right ventricular systolic function visually appears normal. Estimated pulmonary artery systolic pressure is at 33 mmHg assuming a right atrial pressure of 3 mmHg.    Stress Test: 1/9/2019  Summary    Overall findings represent a low risk scan.    There is normal isotope uptake at stress and rest. There is no evidence of    myocardial ischemia or scar.    Normal LV size and systolic function.    Non-diagnostic EKG response due to failure to reach target heart rate.     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    Rectal mass 03/13/2023    Sinus pause 01/10/2023    Cardiac pacemaker in situ 02/01/2023    Left carpal tunnel syndrome 01/26/2022    Prediabetes 01/07/2020    Arthritis of right knee 01/14/2019    Mechanical complication of internal orthopedic device, implant or graft (HCC) 01/08/2019    SHAINA on CPAP 09/07/2018    Mild cognitive impairment 08/26/2016    Allergic rhinitis due to pollen 07/22/2016    Mixed hyperlipidemia 12/23/2015    Squamous cell cancer of scalp and skin of neck 10/28/2015    Mitral valve disease 02/22/2013    Aortic valve disease 02/22/2013    H/O endocarditis 02/22/2013    HTN (hypertension) 01/17/2013    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

## 2024-07-25 ENCOUNTER — OFFICE VISIT (OUTPATIENT)
Dept: CARDIOLOGY CLINIC | Age: 84
End: 2024-07-25
Payer: MEDICARE

## 2024-07-25 ENCOUNTER — NURSE ONLY (OUTPATIENT)
Dept: CARDIOLOGY CLINIC | Age: 84
End: 2024-07-25

## 2024-07-25 VITALS
HEART RATE: 86 BPM | WEIGHT: 172.2 LBS | BODY MASS INDEX: 29.56 KG/M2 | SYSTOLIC BLOOD PRESSURE: 122 MMHG | DIASTOLIC BLOOD PRESSURE: 84 MMHG

## 2024-07-25 DIAGNOSIS — I44.39 HIGH-GRADE ATRIOVENTRICULAR BLOCK: ICD-10-CM

## 2024-07-25 DIAGNOSIS — Z79.01 ON CONTINUOUS ORAL ANTICOAGULATION: ICD-10-CM

## 2024-07-25 DIAGNOSIS — I44.2 CHB (COMPLETE HEART BLOCK) (HCC): ICD-10-CM

## 2024-07-25 DIAGNOSIS — I10 ESSENTIAL HYPERTENSION: ICD-10-CM

## 2024-07-25 DIAGNOSIS — Z95.0 CARDIAC PACEMAKER IN SITU: ICD-10-CM

## 2024-07-25 DIAGNOSIS — I48.0 PAROXYSMAL ATRIAL FIBRILLATION (HCC): Primary | ICD-10-CM

## 2024-07-25 PROCEDURE — 3079F DIAST BP 80-89 MM HG: CPT | Performed by: NURSE PRACTITIONER

## 2024-07-25 PROCEDURE — 1123F ACP DISCUSS/DSCN MKR DOCD: CPT | Performed by: NURSE PRACTITIONER

## 2024-07-25 PROCEDURE — 3074F SYST BP LT 130 MM HG: CPT | Performed by: NURSE PRACTITIONER

## 2024-07-25 PROCEDURE — 99214 OFFICE O/P EST MOD 30 MIN: CPT | Performed by: NURSE PRACTITIONER

## 2024-07-25 PROCEDURE — 93000 ELECTROCARDIOGRAM COMPLETE: CPT | Performed by: NURSE PRACTITIONER

## 2024-07-25 RX ORDER — METOPROLOL SUCCINATE 25 MG/1
25 TABLET, EXTENDED RELEASE ORAL DAILY
Qty: 90 TABLET | Refills: 3 | Status: SHIPPED | OUTPATIENT
Start: 2024-07-25

## 2024-07-29 RX ORDER — ROSUVASTATIN CALCIUM 10 MG/1
TABLET, COATED ORAL
Qty: 90 TABLET | Refills: 3 | Status: SHIPPED | OUTPATIENT
Start: 2024-07-29

## 2024-07-29 NOTE — TELEPHONE ENCOUNTER
Medication:   Requested Prescriptions     Pending Prescriptions Disp Refills    rosuvastatin (CRESTOR) 10 MG tablet [Pharmacy Med Name: ROSUVASTATIN 10MG TABLETS] 90 tablet 3     Sig: TAKE 1 TABLET BY MOUTH EVERY DAY       Last Filled:  8/15/23    Patient Phone Number: [phone] (home)     Last appt: 7/1/2024   Next appt: 8/30/2024    Last Labs DM:   Lab Results   Component Value Date/Time    LABA1C 6.2 02/12/2024 08:59 AM     Last Lipid:   Lab Results   Component Value Date/Time    CHOL 142 02/12/2024 08:59 AM    TRIG 63 02/12/2024 08:59 AM    HDL 43 02/12/2024 08:59 AM    HDL 43 05/23/2012 06:31 AM     Last PSA:   Lab Results   Component Value Date/Time    PSA 3.65 08/21/2020 08:35 AM     Last Thyroid:   Lab Results   Component Value Date/Time    TSH 1.42 04/20/2016 08:40 AM    T4FREE 1.4 04/20/2016 08:40 AM

## 2024-07-29 NOTE — PROGRESS NOTES
[facility] performed by office staff d/t device tech OOT/unavailable. Interrogation completed by remote team and available in MURJ.  Will be uploaded in EPIC under Cardiology tab once reviewed by EPMD.    Pt seen by NPFW today. We will continue to monitor remotely.

## 2024-08-13 ENCOUNTER — OFFICE VISIT (OUTPATIENT)
Dept: SURGERY | Age: 84
End: 2024-08-13
Payer: MEDICARE

## 2024-08-13 VITALS
DIASTOLIC BLOOD PRESSURE: 82 MMHG | SYSTOLIC BLOOD PRESSURE: 123 MMHG | BODY MASS INDEX: 29.53 KG/M2 | HEIGHT: 64 IN | TEMPERATURE: 97.3 F | WEIGHT: 173 LBS

## 2024-08-13 DIAGNOSIS — C43.59 MALIGNANT MELANOMA OF TORSO EXCLUDING BREAST (HCC): Primary | ICD-10-CM

## 2024-08-13 PROCEDURE — 1123F ACP DISCUSS/DSCN MKR DOCD: CPT | Performed by: SURGERY

## 2024-08-13 PROCEDURE — 3074F SYST BP LT 130 MM HG: CPT | Performed by: SURGERY

## 2024-08-13 PROCEDURE — 99213 OFFICE O/P EST LOW 20 MIN: CPT | Performed by: SURGERY

## 2024-08-13 PROCEDURE — 3079F DIAST BP 80-89 MM HG: CPT | Performed by: SURGERY

## 2024-08-13 NOTE — TELEPHONE ENCOUNTER
Medication refill:    Medication  apixaban (ELIQUIS) 5 MG TABS tablet [[id number]]  apixaban (ELIQUIS) 5 MG TABS tablet [[id number]]    Order Details  Dose: 5 mg Route: Oral Frequency: 2 TIMES DAILY   Dispense Quantity: 60 tablet Refills: 5          Sig: Take 1 tablet by mouth 2 times daily           Pharmacy  [id number] [place] [place] [address] - P [phone] - F [phone]  [facility] [address] [place] [address]  Phone: [phone]  Fax: [phone]      Last visit:  7/25/24  Next visit: 1/27/2025  Refill:  2/26/24

## 2024-08-28 ENCOUNTER — OFFICE VISIT (OUTPATIENT)
Dept: DERMATOLOGY | Age: 84
End: 2024-08-28
Payer: MEDICARE

## 2024-08-28 DIAGNOSIS — L57.0 AK (ACTINIC KERATOSIS): Primary | ICD-10-CM

## 2024-08-28 DIAGNOSIS — Z85.828 HISTORY OF NONMELANOMA SKIN CANCER: ICD-10-CM

## 2024-08-28 DIAGNOSIS — D22.9 BENIGN NEVUS: ICD-10-CM

## 2024-08-28 DIAGNOSIS — Z85.820 HISTORY OF MELANOMA: ICD-10-CM

## 2024-08-28 DIAGNOSIS — L57.0 ACTINIC KERATOSIS TREATED WITH TOPICAL FLUOROURACIL (5FU): ICD-10-CM

## 2024-08-28 PROCEDURE — 17003 DESTRUCT PREMALG LES 2-14: CPT | Performed by: DERMATOLOGY

## 2024-08-28 PROCEDURE — 17000 DESTRUCT PREMALG LESION: CPT | Performed by: DERMATOLOGY

## 2024-08-28 PROCEDURE — 99214 OFFICE O/P EST MOD 30 MIN: CPT | Performed by: DERMATOLOGY

## 2024-08-28 PROCEDURE — 1123F ACP DISCUSS/DSCN MKR DOCD: CPT | Performed by: DERMATOLOGY

## 2024-08-28 NOTE — PROGRESS NOTES
[facility]  Estella Crespo M.D.  [phone]       Jeff Berman  1940    83 y.o. male     Date of Visit: 8/28/2024    Chief Complaint:   Chief Complaint   Patient presents with    Skin Lesion        I was asked to see this patient by Dr. Devi ref. provider found.    History of Present Illness:  1. TBSE-presents with his wife    Multiple nevi. Patient has not noticed any new or changing pigmented lesions.   Stable in size, shape, color. Not itching, bleeding.     Increasing number of actinic keratoses scalp, shoulders, ears.  Last completed fluorouracil 1/24 with good improvement.  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 + ABX prophy     + Pacemaker     9/23 right lower back malignant melanoma Breslow depth 1.8 with a mitotic rate of 3, no ulceration status post wide local excision with negative sentinel lymph nodes x3, [place] 1B  11/23 left lower paraspinal back nodular and superficial basal cell carcinoma status post curettage  1/24 fluorouracil face-forehead, temples, nasal dorsum, ears, scalp  4/24 right chest nodular basal cell carcinoma status post curettage    Review of Systems:  Constitutional: Reports general sense of well-being       Past Medical History, Surgical History, Family History, Medications and Allergies reviewed.    Social History:   Social History     Socioeconomic History    Marital status:      Spouse name: Not on file    Number of children: Not on file    Years of education: Not on file    Highest education level: Not on file   Occupational History    Not on file   Tobacco Use    Smoking status: Never    Smokeless tobacco: Never    Tobacco comments:     DENIES SMOKING/VAPING,REC DRUGS, AWARE NOT TO SMOKE DAY BEFORE OR DOS-DG   Vaping Use    Vaping status: Unknown   Substance and Sexual Activity    Alcohol use: Yes     Alcohol/week: 1.0 standard

## 2024-08-30 ENCOUNTER — OFFICE VISIT (OUTPATIENT)
Dept: FAMILY MEDICINE CLINIC | Age: 84
End: 2024-08-30
Payer: MEDICARE

## 2024-08-30 VITALS
BODY MASS INDEX: 29.53 KG/M2 | OXYGEN SATURATION: 97 % | WEIGHT: 173 LBS | HEART RATE: 97 BPM | DIASTOLIC BLOOD PRESSURE: 74 MMHG | SYSTOLIC BLOOD PRESSURE: 126 MMHG | HEIGHT: 64 IN

## 2024-08-30 DIAGNOSIS — I10 PRIMARY HYPERTENSION: ICD-10-CM

## 2024-08-30 DIAGNOSIS — R73.03 PREDIABETES: ICD-10-CM

## 2024-08-30 DIAGNOSIS — Z00.00 MEDICARE ANNUAL WELLNESS VISIT, SUBSEQUENT: Primary | ICD-10-CM

## 2024-08-30 PROCEDURE — 3078F DIAST BP <80 MM HG: CPT | Performed by: FAMILY MEDICINE

## 2024-08-30 PROCEDURE — 1123F ACP DISCUSS/DSCN MKR DOCD: CPT | Performed by: FAMILY MEDICINE

## 2024-08-30 PROCEDURE — G0439 PPPS, SUBSEQ VISIT: HCPCS | Performed by: FAMILY MEDICINE

## 2024-08-30 PROCEDURE — 3074F SYST BP LT 130 MM HG: CPT | Performed by: FAMILY MEDICINE

## 2024-08-30 RX ORDER — DUTASTERIDE 0.5 MG/1
0.5 CAPSULE, LIQUID FILLED ORAL DAILY
Qty: 90 CAPSULE | Refills: 1 | Status: SHIPPED | OUTPATIENT
Start: 2024-08-30

## 2024-08-30 ASSESSMENT — PATIENT HEALTH QUESTIONNAIRE - PHQ9
SUM OF ALL RESPONSES TO PHQ QUESTIONS 1-9: 0
1. LITTLE INTEREST OR PLEASURE IN DOING THINGS: NOT AT ALL
SUM OF ALL RESPONSES TO PHQ QUESTIONS 1-9: 0
2. FEELING DOWN, DEPRESSED OR HOPELESS: NOT AT ALL
SUM OF ALL RESPONSES TO PHQ9 QUESTIONS 1 & 2: 0

## 2024-08-30 ASSESSMENT — LIFESTYLE VARIABLES
HOW OFTEN DO YOU HAVE A DRINK CONTAINING ALCOHOL: 2-3 TIMES A WEEK
HOW MANY STANDARD DRINKS CONTAINING ALCOHOL DO YOU HAVE ON A TYPICAL DAY: 1 OR 2

## 2024-08-30 NOTE — PROGRESS NOTES
Medicare Annual Wellness Visit    Jeff Berman is here for Medicare AWV    Assessment & Plan   Medicare annual wellness visit, subsequent  -     Lipid Panel; Future  -     Comprehensive Metabolic Panel; Future  Prediabetes  -     Hemoglobin A1C; Future  Primary hypertension  Recommendations for Preventive Services Due: see orders and patient instructions/AVS.  Recommended screening schedule for the next 5-10 years is provided to the patient in written form: see Patient Instructions/AVS.     No follow-ups on file.     Subjective   The following acute and/or chronic problems were also addressed today:      Patient's complete Health Risk Assessment and screening values have been reviewed and are found in Flowsheets. The following problems were reviewed today and where indicated follow up appointments were made and/or referrals ordered.    Positive Risk Factor Screenings with Interventions:                Inactivity:  On average, how many days per week do you engage in moderate to strenuous exercise (like a brisk walk)?: 1 day (!) Abnormal  On average, how many minutes do you engage in exercise at this level?: 10 min  Interventions:  Agrees to restart walking.       Hearing Screen:  Do you or your family notice any trouble with your hearing that hasn't been managed with hearing aids?: (!) Yes    Interventions:  Patient declines any further evaluation or treatment                     Objective   Vitals:    08/30/24 0946   BP: 126/74   Pulse: 97   SpO2: 97%   Weight: 78.5 kg (173 lb)   Height: 1.626 m (5' 4\")      Body mass index is 29.7 kg/m².        General Appearance: alert and oriented to person, place and time, well-developed and well-nourished, in no acute distress  Skin: warm and dry, no rash or erythema  Neck: neck supple and non tender without mass, no thyromegaly or thyroid nodules, no cervical lymphadenopathy   Pulmonary/Chest: clear to auscultation bilaterally- no wheezes, rales or rhonchi, normal air

## 2024-11-21 NOTE — PROGRESS NOTES
[facility] PHYSICIAN PRACTICES  [facility] [address] PRIMARY CARE  [address] [facility]  [place]ela [place] [address]: [phone]         2019     Pola De La Vega (:  1940) is a [de-identified] y.o. male, here for evaluation of the following medical concerns:    Chief Complaint   Patient presents with    Follow-Up from [facility]     , [facility] - positive d dimer. Had doppler done yesterday. HPI  Right lower extremity edema  Worse Wednesday  Had leg cramping- nothing improved- burning, sharp pain  Felt ok if did not move, worse pain with movement  Had a positive D- dimer in the ED: US completed- negative result  Lovenox injection x 1 Wednesday PM  Feels sore  ? Bite- saw a scab  Has not fallen or twisted- no trauma  Elevation did not help the swelling    Right ankle swelling  Worse with movement  Difficulty moving  Swollen  + Warmth  CRP, CBC and ESR normal in the ED, x-ray normal    Review of Systems   Constitutional: Positive for activity change. Negative for appetite change, chills, fatigue and fever. Respiratory: Negative for shortness of breath. Cardiovascular: Positive for leg swelling. Negative for chest pain. Musculoskeletal: Positive for joint swelling. Skin: Negative for rash. Neurological: Negative for dizziness, weakness, numbness and headaches. Prior to Visit Medications    Medication Sig Taking? Authorizing Provider   cephALEXin (KEFLEX) 500 MG capsule Take 1 capsule by mouth 3 times daily for 7 days Yes ANUM Duncan - CNP   aspirin 81 MG tablet Take 81 mg by mouth daily Yes Historical Provider, MD   rosuvastatin (CRESTOR) 10 MG tablet Take 10 mg by mouth daily Yes Historical Provider, MD   Flaxseed, Linseed, 1000 MG CAPS Take 1 capsule by mouth 2 times daily. Yes Historical Provider, MD   multivitamin [facility]) per tablet Take 1 tablet by mouth daily. Yes Historical Provider, MD   Psyllium (METAMUCIL PO) Take 2 Units by mouth daily. 2 TSP.  Yes Historical Flare is in L great toe started 3 days ago.    Provider, MD        Social History     Tobacco Use    Smoking status: Never Smoker    Smokeless tobacco: Never Used   Substance Use Topics    Alcohol use: Yes     Alcohol/week: 2.4 - 3.0 oz     Types: 2 - 3 Glasses of wine, 2 Cans of beer per week     Comment: drinks every other day        Vitals:    06/14/19 0759   BP: 126/68   Site: Right Upper Arm   Position: Sitting   Cuff Size: Large Adult   Pulse: 78   SpO2: 98%   Weight: 176 lb (79.8 kg)   Height: 5' 3.5\" (1.613 m)     Estimated body mass index is 30.69 kg/m² as calculated from the following:    Height as of this encounter: 5' 3.5\" (1.613 m). Weight as of this encounter: 176 lb (79.8 kg). Physical Exam   Constitutional: He is oriented to person, place, and time. He appears well-developed and well-nourished. HENT:   Head: Normocephalic. Cardiovascular: Normal rate, regular rhythm, normal heart sounds, intact distal pulses and normal pulses. Pulmonary/Chest: Effort normal and breath sounds normal.   Musculoskeletal:        Right ankle: He exhibits decreased range of motion, swelling and abnormal pulse. He exhibits no ecchymosis. Tenderness. PP, + perfusion   Neurological: He is oriented to person, place, and time. Skin: Skin is warm. Capillary refill takes 2 to 3 seconds. Small spongy nodule on outer shin, no warmth or tenderness; multiple varicose veins present   Psychiatric: He has a normal mood and affect. ASSESSMENT/PLAN:  1. Edema of right lower extremity  Stable; Per wife moving downward- mostly at ankle now. Continue to elevate. Venous US negative. 2. Right ankle swelling  Stable; X-ray negative in the ED. Wife reports the swelling has traveled down patient's leg. Discussed warmth around ankle, however ESR, CBC and CRP normal.  Begin cephalexin. Discussed ED precautions with patient and wife. - cephALEXin (KEFLEX) 500 MG capsule; Take 1 capsule by mouth 3 times daily for 7 days  Dispense: 21 capsule;  Refill: 0    3. Positive D-dimer  Stable; Venous US negative. Discussed with patient there are other reasons why a D-dimer can be elevated. Follow Up:     Return in about 1 week (around 6/21/2019) for Scheduled appt with Dr. Alma Hart.

## 2024-11-25 RX ORDER — DUTASTERIDE 0.5 MG/1
0.5 CAPSULE, LIQUID FILLED ORAL DAILY
Qty: 90 CAPSULE | Refills: 1 | Status: SHIPPED | OUTPATIENT
Start: 2024-11-25

## 2024-11-25 NOTE — TELEPHONE ENCOUNTER
Medication:   Requested Prescriptions     Pending Prescriptions Disp Refills    dutasteride (AVODART) 0.5 MG capsule [Pharmacy Med Name: DUTASTERIDE 0.5MG CAPSULES] 90 capsule 1     Sig: TAKE 1 CAPSULE BY MOUTH DAILY     Last Filled:  # 90 with 1 refill on 8/30/2024    Last appt: 8/30/2024   Next appt: 12/5/2024    Last OARRS:        No data to display

## 2024-12-02 DIAGNOSIS — Z00.00 MEDICARE ANNUAL WELLNESS VISIT, SUBSEQUENT: ICD-10-CM

## 2024-12-02 DIAGNOSIS — R73.03 PREDIABETES: ICD-10-CM

## 2024-12-02 LAB
ALBUMIN SERPL-MCNC: 4 G/DL (ref 3.4–5)
ALBUMIN/GLOB SERPL: 1.5 {RATIO} (ref 1.1–2.2)
ALP SERPL-CCNC: 101 U/L (ref 40–129)
ALT SERPL-CCNC: 26 U/L (ref 10–40)
ANION GAP SERPL CALCULATED.3IONS-SCNC: 11 MMOL/L (ref 3–16)
AST SERPL-CCNC: 41 U/L (ref 15–37)
BILIRUB SERPL-MCNC: 1.3 MG/DL (ref 0–1)
BUN SERPL-MCNC: 19 MG/DL (ref 7–20)
CALCIUM SERPL-MCNC: 9.5 MG/DL (ref 8.3–10.6)
CHLORIDE SERPL-SCNC: 97 MMOL/L (ref 99–110)
CHOLEST SERPL-MCNC: 136 MG/DL (ref 0–199)
CO2 SERPL-SCNC: 22 MMOL/L (ref 21–32)
CREAT SERPL-MCNC: 1 MG/DL (ref 0.8–1.3)
EST. AVERAGE GLUCOSE BLD GHB EST-MCNC: 134.1 MG/DL
GFR SERPLBLD CREATININE-BSD FMLA CKD-EPI: 73 ML/MIN/{1.73_M2}
GLUCOSE SERPL-MCNC: 92 MG/DL (ref 70–99)
HBA1C MFR BLD: 6.3 %
HDLC SERPL-MCNC: 46 MG/DL (ref 40–60)
LDLC SERPL CALC-MCNC: 78 MG/DL
POTASSIUM SERPL-SCNC: 5.1 MMOL/L (ref 3.5–5.1)
PROT SERPL-MCNC: 6.6 G/DL (ref 6.4–8.2)
SODIUM SERPL-SCNC: 130 MMOL/L (ref 136–145)
TRIGL SERPL-MCNC: 59 MG/DL (ref 0–150)
VLDLC SERPL CALC-MCNC: 12 MG/DL

## 2024-12-05 ENCOUNTER — OFFICE VISIT (OUTPATIENT)
Dept: FAMILY MEDICINE CLINIC | Age: 84
End: 2024-12-05

## 2024-12-05 VITALS
SYSTOLIC BLOOD PRESSURE: 130 MMHG | WEIGHT: 175 LBS | HEART RATE: 86 BPM | DIASTOLIC BLOOD PRESSURE: 90 MMHG | OXYGEN SATURATION: 96 % | HEIGHT: 64 IN | BODY MASS INDEX: 29.88 KG/M2

## 2024-12-05 DIAGNOSIS — R73.03 PREDIABETES: ICD-10-CM

## 2024-12-05 DIAGNOSIS — R41.3 MEMORY LOSS: Primary | ICD-10-CM

## 2024-12-05 DIAGNOSIS — I10 PRIMARY HYPERTENSION: ICD-10-CM

## 2024-12-05 NOTE — PROGRESS NOTES
Jeff Berman (:  1940) is a 83 y.o. male,Established patient, here for evaluation of the following chief complaint(s):  Follow-up      Assessment & Plan   ASSESSMENT/PLAN:  Jeff was seen today for follow-up.    Diagnoses and all orders for this visit:    Memory loss  -     Vitamin B12 & Folate; Future  -     TSH with Reflex; Future  -     MRI BRAIN WO CONTRAST; Future  Mri and blood work to evaluate  Prediabetes  Stable with diet  Primary hypertension  The current medical regimen is effective;  continue present plan and medications.        No follow-ups on file.         Subjective   SUBJECTIVE/OBJECTIVE:  HPI  Pt is a of 83 y.o. male comes in today with   Chief Complaint   Patient presents with    Follow-up   Taking meds as prescribed  Memory has been a little worse  Vitals:    24 0847   BP: (!) 130/90   Pulse: 86   SpO2: 96%   Weight: 79.4 kg (175 lb)   Height: 1.626 m (5' 4\")      Past Medical History:Reviewed  Medications:Reviewed.  Allergies   Allergen Reactions    Demerol Nausea And Vomiting    Meperidine Nausea And Vomiting    Oxycodone Hcl Nausea And Vomiting      Social hx:Reviewed.  Social History     Tobacco Use   Smoking Status Never   Smokeless Tobacco Never   Tobacco Comments    DENIES SMOKING/VAPING,REC DRUGS, AWARE NOT TO SMOKE DAY BEFORE OR DOS-DG                              Review of Systems       Objective   Physical Exam         An electronic signature was used to authenticate this note.    --Blas Arzate MD

## 2024-12-13 ENCOUNTER — HOSPITAL ENCOUNTER (OUTPATIENT)
Dept: MRI IMAGING | Age: 84
Discharge: HOME | End: 2024-12-13
Payer: MEDICARE

## 2024-12-13 VITALS — HEART RATE: 90 BPM | SYSTOLIC BLOOD PRESSURE: 107 MMHG | DIASTOLIC BLOOD PRESSURE: 72 MMHG

## 2024-12-13 DIAGNOSIS — R41.3 MEMORY LOSS: ICD-10-CM

## 2024-12-13 DIAGNOSIS — I44.30 ATRIOVENTRICULAR BLOCK: ICD-10-CM

## 2024-12-13 DIAGNOSIS — I45.5 SINUS PAUSE: ICD-10-CM

## 2024-12-13 DIAGNOSIS — Z95.0 CARDIAC PACEMAKER IN SITU: Primary | ICD-10-CM

## 2024-12-13 PROCEDURE — 93286 PERI-PX EVAL PM/LDLS PM IP: CPT | Performed by: INTERNAL MEDICINE

## 2024-12-13 PROCEDURE — 70551 MRI BRAIN STEM W/O DYE: CPT

## 2024-12-18 ENCOUNTER — OFFICE VISIT (OUTPATIENT)
Age: 84
End: 2024-12-18
Payer: MEDICARE

## 2024-12-18 DIAGNOSIS — L57.0 AK (ACTINIC KERATOSIS): Primary | ICD-10-CM

## 2024-12-18 DIAGNOSIS — D22.9 BENIGN NEVUS: ICD-10-CM

## 2024-12-18 DIAGNOSIS — C44.629 SQUAMOUS CELL CANCER OF SKIN OF LEFT FOREARM: ICD-10-CM

## 2024-12-18 DIAGNOSIS — Z85.820 HISTORY OF MELANOMA: ICD-10-CM

## 2024-12-18 DIAGNOSIS — Z85.828 HISTORY OF NONMELANOMA SKIN CANCER: ICD-10-CM

## 2024-12-18 DIAGNOSIS — L57.0 ACTINIC KERATOSIS TREATED WITH TOPICAL FLUOROURACIL (5FU): ICD-10-CM

## 2024-12-18 PROCEDURE — 99214 OFFICE O/P EST MOD 30 MIN: CPT | Performed by: DERMATOLOGY

## 2024-12-18 PROCEDURE — 1159F MED LIST DOCD IN RCRD: CPT | Performed by: DERMATOLOGY

## 2024-12-18 PROCEDURE — 17000 DESTRUCT PREMALG LESION: CPT | Performed by: DERMATOLOGY

## 2024-12-18 PROCEDURE — 17261 DSTRJ MAL LES T/A/L .6-1.0CM: CPT | Performed by: DERMATOLOGY

## 2024-12-18 PROCEDURE — 1160F RVW MEDS BY RX/DR IN RCRD: CPT | Performed by: DERMATOLOGY

## 2024-12-18 PROCEDURE — 1123F ACP DISCUSS/DSCN MKR DOCD: CPT | Performed by: DERMATOLOGY

## 2024-12-18 PROCEDURE — 17003 DESTRUCT PREMALG LES 2-14: CPT | Performed by: DERMATOLOGY

## 2024-12-18 NOTE — PROGRESS NOTES
[facility]at[facility]  Estella Crespo M.D.  [phone]       Jeff Berman  1940    83 y.o. male     Date of Visit: 12/18/2024    Chief Complaint:   Chief Complaint   Patient presents with    Skin Lesion        I was asked to see this patient by Dr. Devi ref. provider found.    History of Present Illness:  1. TBSE    Multiple nevi. Patient has not noticed any new or changing pigmented lesions.   Stable in size, shape, color. Not itching, bleeding.     Actinic keratosis-increasing number of actinic keratoses over his face, scalp.  Dry papules dorsal hands and forearms.  Last completed field therapy with fluorouracil 1/24 face and scalp.    New keratotic papule left mid dorsal forearm.  Nontender.  Not itching or bleeding.  Present for weeks.    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 + ABX prophy     + Pacemaker     9/23 right lower back malignant melanoma Breslow depth 1.8 with a mitotic rate of 3, no ulceration status post wide local excision with negative sentinel lymph nodes x3, [place] 1B  11/23 left lower paraspinal back nodular and superficial basal cell carcinoma status post curettage  1/24 fluorouracil face-forehead, temples, nasal dorsum, ears, scalp  4/24 right chest nodular basal cell carcinoma status post curettage    Review of Systems:  Constitutional: Reports general sense of well-being       Past Medical History, Surgical History, Family History, Medications and Allergies reviewed.    Social History:   Social History     Socioeconomic History    Marital status:      Spouse name: Not on file    Number of children: Not on file    Years of education: Not on file    Highest education level: Not on file   Occupational History    Not on file   Tobacco Use    Smoking status: Never    Smokeless tobacco: Never    Tobacco comments:     DENIES SMOKING/VAPING,REC DRUGS, AWARE NOT TO SMOKE DAY BEFORE OR DOS-DG   Vaping Use

## 2024-12-23 LAB — DERMATOLOGY PATHOLOGY REPORT: ABNORMAL

## 2025-01-28 ENCOUNTER — NURSE ONLY (OUTPATIENT)
Dept: CARDIOLOGY CLINIC | Age: 85
End: 2025-01-28

## 2025-01-28 ENCOUNTER — OFFICE VISIT (OUTPATIENT)
Dept: CARDIOLOGY CLINIC | Age: 85
End: 2025-01-28

## 2025-01-28 VITALS
WEIGHT: 173.8 LBS | SYSTOLIC BLOOD PRESSURE: 114 MMHG | BODY MASS INDEX: 29.83 KG/M2 | HEART RATE: 96 BPM | DIASTOLIC BLOOD PRESSURE: 72 MMHG | OXYGEN SATURATION: 97 %

## 2025-01-28 DIAGNOSIS — I44.39 HIGH-GRADE ATRIOVENTRICULAR BLOCK: ICD-10-CM

## 2025-01-28 DIAGNOSIS — I45.5 SINUS PAUSE: ICD-10-CM

## 2025-01-28 DIAGNOSIS — Z95.0 CARDIAC PACEMAKER IN SITU: Primary | ICD-10-CM

## 2025-01-28 DIAGNOSIS — E78.2 MIXED HYPERLIPIDEMIA: ICD-10-CM

## 2025-01-28 DIAGNOSIS — I48.0 PAF (PAROXYSMAL ATRIAL FIBRILLATION) (HCC): ICD-10-CM

## 2025-01-30 NOTE — PROGRESS NOTES
[facility]  [address] [place] [address], [address]  [place] [address]  Dept: [phone]  Dept Fax: [phone]      Visit Date: 2/11/2025   Subjective:   Jeff Berman is a 84 y.o. male here for follow-up on melanoma of the right lower back which was excised on 9/19/23.    Patient is followed by Dr. Estella Crespo. 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 Crespo 2/24/25.     He is overall doing well. No new major medical issues since last visit. Today he denies new subcutaneous mass, headache, bone pain, cough, abdominal pain, weight loss, jaundice or suspicious new lesions. No nodules noted. Following regularly with dermatology. No new complaints. Review of systems is otherwise negative    Initial Biopsy Date 9/5/23   Thickness  At least 1.8 mm   Ulceration  Not identified   Regression  Not identified   Mitotic rate  3/mm2   Stage  pT3a     Surgical Excision Date: 9/19/23   [place] Lymph Node Biopsy: Negative  Decision DX Class: 1B     Objective:     Vitals:    02/11/25 0922   BP: (!) 133/91   Pulse: 90   Temp: 97.7 °F (36.5 °C)        Constitutional: Patient is oriented to person, place, and time. No distress.   HENT:  Mucus membranes - moist. No scleral icterus.    Neck:  Normal range of motion. No lymphadenopathy present.    Pulmonary/Chest:  Effort normal. No respiratory distress. Bilateral symmetrical chest rise. No audible additional breath sounds.   Abdomen:  Soft, non-tender. No masses, no organomegaly.    Neurological:  Grossly intact motor and sensory systems on limited exam.   Skin: Skin is warm and dry. No rash noted. He is not diaphoretic.    Surgical site:               Incision normal: Yes              Surrounding nodularity: No              Abnormal pigmentation: No              Other lesions: No  No lymphadenopathy/palpable nodes to bilateral axilla,

## 2025-02-05 ENCOUNTER — OFFICE VISIT (OUTPATIENT)
Dept: ENT CLINIC | Age: 85
End: 2025-02-05

## 2025-02-05 VITALS
HEIGHT: 64 IN | DIASTOLIC BLOOD PRESSURE: 80 MMHG | HEART RATE: 97 BPM | SYSTOLIC BLOOD PRESSURE: 116 MMHG | WEIGHT: 174.8 LBS | BODY MASS INDEX: 29.84 KG/M2 | TEMPERATURE: 98.2 F

## 2025-02-05 DIAGNOSIS — R04.0 EPISTAXIS: Primary | ICD-10-CM

## 2025-02-05 ASSESSMENT — ENCOUNTER SYMPTOMS
RHINORRHEA: 0
EYE PAIN: 0
EYE REDNESS: 0
EYE ITCHING: 0
SHORTNESS OF BREATH: 0
FACIAL SWELLING: 0
VOICE CHANGE: 0
NAUSEA: 0
SORE THROAT: 0
SINUS PRESSURE: 0
DIARRHEA: 0
TROUBLE SWALLOWING: 0
COUGH: 0
CHOKING: 0
SINUS PAIN: 0

## 2025-02-05 NOTE — PATIENT INSTRUCTIONS
Dr. Haynes Dry Nose Therapy    Please follow the instructions below for treatment management of your nasal crusting. .     1. Be a two nostril blower.   2. Do not pick nose.   3. Do not place anything in nose.  4. Nasal saline spray 5 times a day.   5. Saline nasal gel 3 times a day.   6. Bedside humidifier while sleeping.    7. Saline rinses twice a day.   8. If you smoke stop.       Dr. Haynes Epistaxis Post-treatment    Please follow the instructions below for post treatment management of your nose bleed.     1. Do not blow your nose for 7 days.   2. Try to avoid touching you nose for 7 days.   3. Saline nasal spray, 2 sprays each nostril 5 times a day for 2 weeks.   4. Afrin nasal spray, 2 sprays in the bleeding nostril 3 times a day for 3 days.   5. Hold aspirin, ibuprofen and/or fish oil for two weeks.     If you were to have another nose bleed...  1. Afrin nasal spray, 5 sprays into the bleeding nostril.  2. Hold the soft portion of your nose with pressure for ten minutes.   3. If still bleeding repeat the above two steps three more times.   If still bleeding please go to your closest emergency room.

## 2025-02-05 NOTE — PROGRESS NOTES
Subjective:      Patient ID: Jeff Berman is a 84 y.o. male.    HPI  Chief Complaint   Patient presents with    Nose bleeds  History of Present Illness:Jeff is a(n) 84 y.o. male who presents with a history of mitral valve disease on blood thinners with nose bleeds. Has had intermittent bleeding from right nostril in last 6 months. No nasal trauma. Lasts 5-15 minutes. Bad one yesterday.     Patient Active Problem List   Diagnosis    Osteoarthrosis involving lower leg    Actinic keratosis    Generalized osteoarthrosis, involving multiple sites    Hearing loss    BPH with obstruction/lower urinary tract symptoms    Degenerative joint disease of cervical spine    TIA (transient ischemic attack)    Osteoarthritis    Joint prosthesis infection or inflammation (HCC)    Mitral valve disease    Aortic valve disease    H/O endocarditis    HTN (hypertension)    Infection or inflammatory reaction due to other internal prosthetic device, implant, or graft    History of total knee replacement    Squamous cell cancer of scalp and skin of neck    Mixed hyperlipidemia    Allergic rhinitis due to pollen    Mild cognitive impairment    SHAINA on CPAP    Prediabetes    Left carpal tunnel syndrome    Sinus pause    Cardiac pacemaker in situ    Rectal mass    Tubulovillous adenoma    Arthritis of right knee    Infected prosthetic knee joint (HCC)    Mechanical complication of internal orthopedic device, implant or graft (HCC)     Past Surgical History:   Procedure Laterality Date    AORTIC VALVE SURGERY      REPAIR    CARDIAC SURGERY  06/2012    angiogram-no blockage    COLONOSCOPY N/A 08/31/2022    COLONOSCOPY WITH BIOPSY performed by Clive Agrawal MD at [facility] ENDOSCOPY    COLONOSCOPY N/A 08/31/2022    COLONOSCOPY POLYPECTOMY SNARE/COLD BIOPSY performed by Clive Agrawal MD at [facility] ENDOSCOPY    COLONOSCOPY N/A 08/31/2022    COLONOSCOPY SUBMUCOSAL/ SPOT  INJECTION performed by Clive Agrawal MD at [facility] ENDOSCOPY    HERNIA

## 2025-02-10 ENCOUNTER — OFFICE VISIT (OUTPATIENT)
Dept: CARDIOLOGY CLINIC | Age: 85
End: 2025-02-10
Payer: MEDICARE

## 2025-02-10 VITALS
SYSTOLIC BLOOD PRESSURE: 136 MMHG | HEART RATE: 82 BPM | WEIGHT: 173.4 LBS | OXYGEN SATURATION: 96 % | BODY MASS INDEX: 29.76 KG/M2 | DIASTOLIC BLOOD PRESSURE: 70 MMHG

## 2025-02-10 DIAGNOSIS — I10 ESSENTIAL HYPERTENSION: ICD-10-CM

## 2025-02-10 DIAGNOSIS — Z95.0 CARDIAC PACEMAKER IN SITU: Primary | ICD-10-CM

## 2025-02-10 DIAGNOSIS — E78.2 MIXED HYPERLIPIDEMIA: ICD-10-CM

## 2025-02-10 DIAGNOSIS — I48.19 PERSISTENT ATRIAL FIBRILLATION (HCC): ICD-10-CM

## 2025-02-10 PROCEDURE — 99214 OFFICE O/P EST MOD 30 MIN: CPT | Performed by: NURSE PRACTITIONER

## 2025-02-10 PROCEDURE — 1159F MED LIST DOCD IN RCRD: CPT | Performed by: NURSE PRACTITIONER

## 2025-02-10 PROCEDURE — 1123F ACP DISCUSS/DSCN MKR DOCD: CPT | Performed by: NURSE PRACTITIONER

## 2025-02-10 NOTE — PROGRESS NOTES
TRIG 59 2024    HDL 46 2024    LDL 78 2024    VLDL 12 2024     Pro-BNP No results found for: \"PROBNP\"    IMAGIN/7/2025 Device interrogation: unremarkable; see media tab    2024 Echo   Left ventricular cavity size is normal.   Normal left ventricular wall thickness.   Left ventricular function is mildly reduced with ejection fraction estimated   at 45-50%.   Moderate tricuspid regurgitation.   Diastolic filling parameters suggest grade I diastolic dysfunction.   The ascending aorta is dilated measuring 4.0 cm.   The right ventricle is enlarged. Right ventricular systolic function is   mildly reduced.   Mild tricuspid regurgitation.   Estimated pulmonary artery systolic pressure is at 32 mmHg assuming a right   atrial pressure of 8 mmHg.     Monitor  Analysis time: 330 hr   Mean HR: 75 bpm   Maximum HR: 125 bpm   Minimum HR: 62 bpm     Ventricular prematurities - total: 1561 Ventricular prematurities - pairs: 14   Ventricular prematurities - triplets: 3 Ventricular prematurities - runs: 1 VT longest run:4 beats   VT fastest run: 154 beats or   Supraventricular prematurities - total:[id number] Supraventricular prematurities - pairs: 4190 Supraventricular prematurities - runs: 55     Supraventricular longest run:13 beats or 6.5 sec   Supraventricular fastest run:128 beats at or 2 sec   Longest pause: 3.4 sec   Symptom diary not returned      Nuc stress   Overall findings represent a low risk scan.   There is normal isotope uptake at stress and rest. There is no evidence of   myocardial ischemia or scar.   Normal LV size and systolic function.   Non-diagnostic EKG response due to failure to reach target heart rate.    /[facility]   Angiographic Findings:  Right dominant system  Left Main:  Normal  Left Anterior Descending:  Minimal mid luminal irregularities  Circumflex:  Mild luminal irregularities  Right Coronary:  Normal  Left Ventriculogram:  Not performed.  Pressures (mm Hg):

## 2025-02-11 ENCOUNTER — OFFICE VISIT (OUTPATIENT)
Dept: SURGERY | Age: 85
End: 2025-02-11
Payer: MEDICARE

## 2025-02-11 VITALS
DIASTOLIC BLOOD PRESSURE: 91 MMHG | TEMPERATURE: 97.7 F | HEIGHT: 64 IN | BODY MASS INDEX: 29.74 KG/M2 | HEART RATE: 90 BPM | WEIGHT: 174.2 LBS | SYSTOLIC BLOOD PRESSURE: 133 MMHG

## 2025-02-11 DIAGNOSIS — C43.59 MALIGNANT MELANOMA OF TORSO EXCLUDING BREAST (HCC): Primary | ICD-10-CM

## 2025-02-11 PROCEDURE — 99213 OFFICE O/P EST LOW 20 MIN: CPT | Performed by: NURSE PRACTITIONER

## 2025-02-11 PROCEDURE — 1160F RVW MEDS BY RX/DR IN RCRD: CPT | Performed by: NURSE PRACTITIONER

## 2025-02-11 PROCEDURE — 1159F MED LIST DOCD IN RCRD: CPT | Performed by: NURSE PRACTITIONER

## 2025-02-11 PROCEDURE — 1123F ACP DISCUSS/DSCN MKR DOCD: CPT | Performed by: NURSE PRACTITIONER

## 2025-02-21 ENCOUNTER — HOSPITAL ENCOUNTER (EMERGENCY)
Age: 85
Discharge: HOME | End: 2025-02-21
Attending: STUDENT IN AN ORGANIZED HEALTH CARE EDUCATION/TRAINING PROGRAM
Payer: MEDICARE

## 2025-02-21 VITALS
WEIGHT: 181.9 LBS | HEIGHT: 64 IN | HEART RATE: 93 BPM | RESPIRATION RATE: 16 BRPM | DIASTOLIC BLOOD PRESSURE: 100 MMHG | TEMPERATURE: 98.3 F | SYSTOLIC BLOOD PRESSURE: 138 MMHG | OXYGEN SATURATION: 99 % | BODY MASS INDEX: 31.05 KG/M2

## 2025-02-21 DIAGNOSIS — R04.0 EPISTAXIS: Primary | ICD-10-CM

## 2025-02-21 PROCEDURE — 99282 EMERGENCY DEPT VISIT SF MDM: CPT

## 2025-02-21 PROCEDURE — 6370000000 HC RX 637 (ALT 250 FOR IP)

## 2025-02-21 RX ORDER — OXYMETAZOLINE HYDROCHLORIDE 0.05 G/100ML
2 SPRAY NASAL ONCE
Status: COMPLETED | OUTPATIENT
Start: 2025-02-21 | End: 2025-02-21

## 2025-02-21 RX ADMIN — OXYMETAZOLINE HYDROCHLORIDE 2 SPRAY: 0.5 SPRAY NASAL at 13:06

## 2025-02-21 ASSESSMENT — PAIN - FUNCTIONAL ASSESSMENT: PAIN_FUNCTIONAL_ASSESSMENT: NONE - DENIES PAIN

## 2025-02-21 NOTE — ED NOTES
Patient prepared for and ready to be discharged. Patient discharged at this time in no acute distress after verbalizing understanding of discharge instructions. Patient left after receiving After Visit Summary instructions.       Adi Jefferson, RN  02/21/25 8073

## 2025-02-21 NOTE — DISCHARGE INSTRUCTIONS
It was a pleasure taking care of you today.  You were seen for a nosebleed, and on our initial assessment it appears that your bleeding had stopped.  There were a few occasions during your emergency department stay where you blew your nose and this caused the bleeding to continue.  For the next 10 to 14 days, you should try to avoid blowing her nose as this appears to be a precipitating factor.  If your bleeding were to recur, you should blow your nose to clear the clots, take 2 sprays of aspirin into the affected nostril, and immediately clamp the nostril for at least 20 minutes.  If this does not resolve the bleeding, you should come back to the emergency department for reevaluation.  Otherwise, you should resume taking your Afrin sprays as previously directed for at least 1 week.

## 2025-02-24 ENCOUNTER — OFFICE VISIT (OUTPATIENT)
Age: 85
End: 2025-02-24
Payer: MEDICARE

## 2025-02-24 DIAGNOSIS — C44.629 SQUAMOUS CELL CANCER OF SKIN OF LEFT FOREARM: ICD-10-CM

## 2025-02-24 DIAGNOSIS — L57.0 ACTINIC KERATOSIS TREATED WITH TOPICAL FLUOROURACIL (5FU): Primary | ICD-10-CM

## 2025-02-24 DIAGNOSIS — Z85.820 HISTORY OF MELANOMA: ICD-10-CM

## 2025-02-24 PROCEDURE — 1123F ACP DISCUSS/DSCN MKR DOCD: CPT | Performed by: DERMATOLOGY

## 2025-02-24 PROCEDURE — 1159F MED LIST DOCD IN RCRD: CPT | Performed by: DERMATOLOGY

## 2025-02-24 PROCEDURE — 99214 OFFICE O/P EST MOD 30 MIN: CPT | Performed by: DERMATOLOGY

## 2025-02-24 PROCEDURE — 1160F RVW MEDS BY RX/DR IN RCRD: CPT | Performed by: DERMATOLOGY

## 2025-02-24 NOTE — PROGRESS NOTES
Plan  Actinic keratoses treated with fluorouracil-continue to monitor, will likely need repeat courses in the future.    Squamous cell carcinoma left forearm-no evidence of residual lesion.  Monitor for recurrence.      Skin cancer  Follow-up for comprehensive skin exam in April or May 2025, before summer.    Follow-up: Follow-up for comprehensive skin exam in April or May 2025, before summer.

## 2025-02-25 NOTE — ED PROVIDER NOTES
ED Attending Attestation Note     Date of evaluation: 2/21/2025    This patient was seen by the resident.  I have seen and examined the patient, agree with the workup, evaluation, management and diagnosis. The care plan has been discussed.    My assessment reveals well-appearing gentleman in no acute distress lying on stretcher.  The patient has slight venous oozing from the nare.  The nose after having epistaxis.  He however does not have significant epistaxis.  Improved after receiving Afrin as well as compression.  Will plan for outpatient follow-up with ENT.        Iesha Booth MD  02/21/25 7514    
that he does not use drugs.    Medications     Discharge Medication List as of 2/21/2025  1:15 PM        CONTINUE these medications which have NOT CHANGED    Details   dutasteride (AVODART) 0.5 MG capsule TAKE 1 CAPSULE BY MOUTH DAILY, Disp-90 capsule, R-1Normal      apixaban (ELIQUIS) 5 MG TABS tablet Take 1 tablet by mouth 2 times daily, Disp-180 tablet, R-3Normal      rosuvastatin (CRESTOR) 10 MG tablet TAKE 1 TABLET BY MOUTH EVERY DAY, Disp-90 tablet, R-3Normal      metoprolol succinate (TOPROL XL) 25 MG extended release tablet Take 1 tablet by mouth daily, Disp-90 tablet, R-3Normal      tamsulosin (FLOMAX) 0.4 MG capsule Take 2 capsules by mouth daily, Disp-180 capsule, R-3Normal      Flaxseed, Linseed, 1000 MG CAPS Take 1 capsule by mouth 2 times dailyHistorical Med      multivitamin (THERAGRAN) per tablet Take 1 tablet by mouth dailyHistorical Med             Allergies     He is allergic to demerol, meperidine, and oxycodone hcl.    Physical Exam     INITIAL VITALS: BP: (!) 138/100, Temp: 98.3 °F (36.8 °C), Pulse: 93, Respirations: 16, SpO2: 99 %   Physical Exam  Constitutional:       General: He is not in acute distress.     Appearance: Normal appearance. He is not toxic-appearing.   HENT:      Head: Normocephalic and atraumatic.      Comments: There is dried blood about the R narus with no active bleeding.  There is a clot in the anterior narus overlying Kiesselbachs plexus.  The L narus is unremarkable.  There is no blood pooling in the oropharynx, and no dried blood is noted.     Mouth/Throat:      Mouth: Mucous membranes are moist.      Pharynx: Oropharynx is clear.   Eyes:      Extraocular Movements: Extraocular movements intact.      Pupils: Pupils are equal, round, and reactive to light.   Cardiovascular:      Rate and Rhythm: Normal rate and regular rhythm.      Pulses: Normal pulses.      Heart sounds: Normal heart sounds.   Pulmonary:      Effort: Pulmonary effort is normal.      Breath sounds:

## 2025-02-26 ENCOUNTER — OFFICE VISIT (OUTPATIENT)
Dept: ENT CLINIC | Age: 85
End: 2025-02-26
Payer: MEDICARE

## 2025-02-26 VITALS
WEIGHT: 172.4 LBS | DIASTOLIC BLOOD PRESSURE: 83 MMHG | BODY MASS INDEX: 30.55 KG/M2 | HEIGHT: 63 IN | SYSTOLIC BLOOD PRESSURE: 123 MMHG | TEMPERATURE: 97.3 F | HEART RATE: 96 BPM

## 2025-02-26 DIAGNOSIS — R04.0 EPISTAXIS: Primary | ICD-10-CM

## 2025-02-26 PROCEDURE — 30901 CONTROL OF NOSEBLEED: CPT | Performed by: OTOLARYNGOLOGY

## 2025-02-26 NOTE — PROGRESS NOTES
Patient had another bleed on Friday. ER. Afrin and nasal clamp. On blood thinners    PE: Right anterior fragile vessel with brisk bleeding with light touch.     Control of Epistaxis    Pre Op: right sided Epistaxis  Post Op: Same    After consent was obtained a cottonoid soaked with lidocaine was placed in the right nasal cavity. After 5 minutes cautery was performed of the right anterior.  Nasal endoscopy was not needed to perform the control of bleeding. The bleeding site was not posterior.  Hemostasis was obtained. The patient tolerated well. No complications.    I attest that I was present for and did the entire procedure myself.     A./P  Dr. Haynes Epistaxis Post-treatment    Please follow the instructions below for post treatment management of your nose bleed.     1. Do not blow your nose for 7 days.   2. Try to avoid touching you nose for 7 days.   3. Saline nasal spray, 2 sprays each nostril 5 times a day for 2 weeks.   4. Afrin nasal spray, 2 sprays in the bleeding nostril 3 times a day for 3 days.   5. Hold aspirin, ibuprofen and/or fish oil for two weeks.     If you were to have another nose bleed...  1. Afrin nasal spray, 5 sprays into the bleeding nostril.  2. Hold the soft portion of your nose with pressure for ten minutes.   3. If still bleeding repeat the above two steps three more times.   If still bleeding please go to your closest emergency room.

## 2025-03-03 DIAGNOSIS — R41.3 MEMORY LOSS: ICD-10-CM

## 2025-03-03 LAB
FOLATE SERPL-MCNC: 27.4 NG/ML (ref 4.78–24.2)
TSH SERPL DL<=0.005 MIU/L-ACNC: 1.83 UIU/ML (ref 0.27–4.2)
VIT B12 SERPL-MCNC: 650 PG/ML (ref 211–911)

## 2025-03-09 SDOH — ECONOMIC STABILITY: FOOD INSECURITY: WITHIN THE PAST 12 MONTHS, THE FOOD YOU BOUGHT JUST DIDN'T LAST AND YOU DIDN'T HAVE MONEY TO GET MORE.: NEVER TRUE

## 2025-03-09 SDOH — HEALTH STABILITY: PHYSICAL HEALTH: ON AVERAGE, HOW MANY DAYS PER WEEK DO YOU ENGAGE IN MODERATE TO STRENUOUS EXERCISE (LIKE A BRISK WALK)?: 1 DAY

## 2025-03-09 SDOH — ECONOMIC STABILITY: TRANSPORTATION INSECURITY
IN THE PAST 12 MONTHS, HAS LACK OF TRANSPORTATION KEPT YOU FROM MEETINGS, WORK, OR FROM GETTING THINGS NEEDED FOR DAILY LIVING?: NO

## 2025-03-09 SDOH — ECONOMIC STABILITY: INCOME INSECURITY: IN THE LAST 12 MONTHS, WAS THERE A TIME WHEN YOU WERE NOT ABLE TO PAY THE MORTGAGE OR RENT ON TIME?: NO

## 2025-03-09 SDOH — ECONOMIC STABILITY: FOOD INSECURITY: WITHIN THE PAST 12 MONTHS, YOU WORRIED THAT YOUR FOOD WOULD RUN OUT BEFORE YOU GOT MONEY TO BUY MORE.: NEVER TRUE

## 2025-03-09 SDOH — ECONOMIC STABILITY: TRANSPORTATION INSECURITY
IN THE PAST 12 MONTHS, HAS THE LACK OF TRANSPORTATION KEPT YOU FROM MEDICAL APPOINTMENTS OR FROM GETTING MEDICATIONS?: NO

## 2025-03-09 SDOH — HEALTH STABILITY: PHYSICAL HEALTH: ON AVERAGE, HOW MANY MINUTES DO YOU ENGAGE IN EXERCISE AT THIS LEVEL?: 0 MIN

## 2025-03-09 ASSESSMENT — PATIENT HEALTH QUESTIONNAIRE - PHQ9
SUM OF ALL RESPONSES TO PHQ QUESTIONS 1-9: 0
SUM OF ALL RESPONSES TO PHQ QUESTIONS 1-9: 0
2. FEELING DOWN, DEPRESSED OR HOPELESS: NOT AT ALL
SUM OF ALL RESPONSES TO PHQ QUESTIONS 1-9: 0
1. LITTLE INTEREST OR PLEASURE IN DOING THINGS: NOT AT ALL
SUM OF ALL RESPONSES TO PHQ QUESTIONS 1-9: 0

## 2025-03-09 ASSESSMENT — LIFESTYLE VARIABLES
HOW OFTEN DO YOU HAVE SIX OR MORE DRINKS ON ONE OCCASION: 1
HOW MANY STANDARD DRINKS CONTAINING ALCOHOL DO YOU HAVE ON A TYPICAL DAY: 1
HOW OFTEN DO YOU HAVE A DRINK CONTAINING ALCOHOL: 4
HOW OFTEN DO YOU HAVE A DRINK CONTAINING ALCOHOL: 2-3 TIMES A WEEK
HOW MANY STANDARD DRINKS CONTAINING ALCOHOL DO YOU HAVE ON A TYPICAL DAY: 1 OR 2

## 2025-03-10 ENCOUNTER — OFFICE VISIT (OUTPATIENT)
Dept: FAMILY MEDICINE CLINIC | Age: 85
End: 2025-03-10

## 2025-03-10 VITALS
WEIGHT: 175 LBS | OXYGEN SATURATION: 98 % | HEIGHT: 63 IN | BODY MASS INDEX: 31.01 KG/M2 | DIASTOLIC BLOOD PRESSURE: 82 MMHG | HEART RATE: 97 BPM | SYSTOLIC BLOOD PRESSURE: 128 MMHG

## 2025-03-10 DIAGNOSIS — Z96.659 HISTORY OF TOTAL KNEE REPLACEMENT, UNSPECIFIED LATERALITY: ICD-10-CM

## 2025-03-10 DIAGNOSIS — R26.81 GAIT INSTABILITY: ICD-10-CM

## 2025-03-10 DIAGNOSIS — Z00.00 MEDICARE ANNUAL WELLNESS VISIT, SUBSEQUENT: Primary | ICD-10-CM

## 2025-03-10 DIAGNOSIS — M15.9 GENERALIZED OSTEOARTHROSIS, INVOLVING MULTIPLE SITES: ICD-10-CM

## 2025-03-10 RX ORDER — DONEPEZIL HYDROCHLORIDE 5 MG/1
5 TABLET, FILM COATED ORAL NIGHTLY
Qty: 30 TABLET | Refills: 1 | Status: SHIPPED | OUTPATIENT
Start: 2025-03-10

## 2025-03-10 RX ORDER — DONEPEZIL HYDROCHLORIDE 5 MG/1
5 TABLET, FILM COATED ORAL NIGHTLY
Qty: 90 TABLET | OUTPATIENT
Start: 2025-03-10

## 2025-03-10 NOTE — PATIENT INSTRUCTIONS
Learning About Being Active as an Older Adult  Why is being active important as you get older?     Being active is one of the best things you can do for your health. And it's never too late to start. Being active--or getting active, if you aren't already--has definite benefits. It can:  Give you more energy,  Keep your mind sharp.  Improve balance to reduce your risk of falls.  Help you manage chronic illness with fewer medicines.  No matter how old you are, how fit you are, or what health problems you have, there is a form of activity that will work for you. And the more physical activity you can do, the better your overall health will be.  What kinds of activity can help you stay healthy?  Being more active will make your daily activities easier. Physical activity includes planned exercise and things you do in daily life. There are four types of activity:  Aerobic.  Doing aerobic activity makes your heart and lungs strong.  Includes walking, dancing, and gardening.  Aim for at least 2½ hours spread throughout the week.  It improves your energy and can help you sleep better.  Muscle-strengthening.  This type of activity can help maintain muscle and strengthen bones.  Includes climbing stairs, using resistance bands, and lifting or carrying heavy loads.  Aim for at least twice a week.  It can help protect the knees and other joints.  Stretching.  Stretching gives you better range of motion in joints and muscles.  Includes upper arm stretches, calf stretches, and gentle yoga.  Aim for at least twice a week, preferably after your muscles are warmed up from other activities.  It can help you function better in daily life.  Balancing.  This helps you stay coordinated and have good posture.  Includes heel-to-toe walking, troy chi, and certain types of yoga.  Aim for at least 3 days a week.  It can reduce your risk of falling.  Even if you have a hard time meeting the recommendations, it's better to be more active

## 2025-03-10 NOTE — PROGRESS NOTES
Medicare Annual Wellness Visit    Jeff Berman is here for Medicare AWV    Assessment & Plan   Jeff was seen today for medicare awv.    Diagnoses and all orders for this visit:    Medicare annual wellness visit, subsequent    Gait instability  Home PT consulted since difficulty getting out for physical therapy   Generalized osteoarthrosis, involving multiple sites    History of total knee replacement, unspecified laterality    Other orders  -     donepezil (ARICEPT) 5 MG tablet; Take 1 tablet by mouth nightly            No follow-ups on file.     Subjective   Doesn't drive much. Hard time getting out of the house.  More trouble walking    Patient's complete Health Risk Assessment and screening values have been reviewed and are found in Flowsheets. The following problems were reviewed today and where indicated follow up appointments were made and/or referrals ordered.    Positive Risk Factor Screenings with Interventions:              Inactivity:  On average, how many days per week do you engage in moderate to strenuous exercise (like a brisk walk)?: (Patient-Rptd) 1 day (!) Abnormal  On average, how many minutes do you engage in exercise at this level?: (Patient-Rptd) 0 min  Interventions:  PT recommended since balance more of an issue.     Abnormal BMI (obese):  Body mass index is 31 kg/m². (!) Abnormal  Interventions:  See AVS for additional education material                           Objective   Vitals:    03/10/25 0915   BP: 128/82   Pulse: 97   SpO2: 98%   Weight: 79.4 kg (175 lb)   Height: 1.6 m (5' 3\")      Body mass index is 31 kg/m².        General Appearance: alert and oriented to person, place and time, well-developed and well-nourished, in no acute distress  Skin: warm and dry, no rash or erythema  Neck: neck supple and non tender without mass, no thyromegaly or thyroid nodules, no cervical lymphadenopathy   Pulmonary/Chest: clear to auscultation bilaterally- no wheezes, rales or rhonchi, normal air

## 2025-03-18 ENCOUNTER — TELEPHONE (OUTPATIENT)
Dept: FAMILY MEDICINE CLINIC | Age: 85
End: 2025-03-18

## 2025-04-03 NOTE — TELEPHONE ENCOUNTER
Medication:   Requested Prescriptions     Pending Prescriptions Disp Refills    donepezil (ARICEPT) 5 MG tablet [Pharmacy Med Name: DONEPEZIL 5MG TABLETS] 90 tablet      Sig: TAKE 1 TABLET BY MOUTH EVERY NIGHT        Last Filled:      Patient Phone Number: [phone] (home)     Last appt: 3/10/2025   Next appt: 6/13/2025    Last OARRS:        No data to display

## 2025-04-04 RX ORDER — DONEPEZIL HYDROCHLORIDE 5 MG/1
5 TABLET, FILM COATED ORAL NIGHTLY
Qty: 90 TABLET | Refills: 1 | Status: SHIPPED | OUTPATIENT
Start: 2025-04-04

## 2025-04-30 ENCOUNTER — OFFICE VISIT (OUTPATIENT)
Age: 85
End: 2025-04-30

## 2025-04-30 DIAGNOSIS — Z85.820 HISTORY OF MELANOMA: ICD-10-CM

## 2025-04-30 DIAGNOSIS — L57.8 DIFFUSE PHOTODAMAGE OF SKIN: ICD-10-CM

## 2025-04-30 DIAGNOSIS — Z85.828 HISTORY OF NONMELANOMA SKIN CANCER: ICD-10-CM

## 2025-04-30 DIAGNOSIS — L57.0 AK (ACTINIC KERATOSIS): Primary | ICD-10-CM

## 2025-04-30 DIAGNOSIS — D22.9 BENIGN NEVUS: ICD-10-CM

## 2025-04-30 NOTE — PROGRESS NOTES
History of melanoma - No evidence of recurrence. Discussed risk of subsequent skin cancers and increased risk of melanoma. Reviewed importance of monitoring skin for change and sun protection with hats and sunscreen, as well as sun avoidance.

## 2025-05-05 RX ORDER — DONEPEZIL HYDROCHLORIDE 5 MG/1
5 TABLET, FILM COATED ORAL NIGHTLY
Qty: 90 TABLET | Refills: 1 | Status: SHIPPED | OUTPATIENT
Start: 2025-05-05 | End: 2025-05-07 | Stop reason: SDUPTHER

## 2025-05-05 NOTE — TELEPHONE ENCOUNTER
Medication:   Requested Prescriptions     Pending Prescriptions Disp Refills    donepezil (ARICEPT) 5 MG tablet [Pharmacy Med Name: DONEPEZIL 5MG TABLETS] 90 tablet 1     Sig: TAKE 1 TABLET BY MOUTH EVERY NIGHT       Last Filled:  4/4/25    Patient Phone Number: [phone] (home)     Last appt: 3/10/2025   Next appt: 6/13/2025    Last Labs DM:   Lab Results   Component Value Date/Time    LABA1C 6.3 12/02/2024 09:06 AM     Last Lipid:   Lab Results   Component Value Date/Time    CHOL 136 12/02/2024 09:06 AM    TRIG 59 12/02/2024 09:06 AM    HDL 46 12/02/2024 09:06 AM    HDL 43 05/23/2012 06:31 AM     Last PSA:   Lab Results   Component Value Date/Time    PSA 3.65 08/21/2020 08:35 AM     Last Thyroid:   Lab Results   Component Value Date/Time    TSH 1.42 04/20/2016 08:40 AM    T4FREE 1.4 04/20/2016 08:40 AM

## 2025-05-06 NOTE — TELEPHONE ENCOUNTER
Advance Refill Approval Request Received...    Requested Prescriptions     Pending Prescriptions Disp Refills    apixaban (ELIQUIS) 5 MG TABS tablet 180 tablet 3     Sig: Take 1 tablet by mouth 2 times daily            Checked Correct Pharmacy: Yes  Any changes since last refill? No     Number: 180  Refills: 3    Last OV: 2/10/2025 Provider: ELENO  Next OV: 8/14/2025 Provider: KINGSLEY    Last Labs: 06/17/2024    CBC:  Lab Results   Component Value Date    WBC 10.5 06/17/2024    HGB 17.3 06/17/2024    HCT 53.4 (H) 06/17/2024    MCV 89.0 06/17/2024     06/17/2024    LYMPHOPCT 6.8 06/17/2024    RBC 5.99 (H) 06/17/2024    MCH 28.9 06/17/2024    MCHC 32.5 06/17/2024    RDW 16.1 (H) 06/17/2024         BMP:  Lab Results   Component Value Date/Time     12/02/2024 09:06 AM    K 5.1 12/02/2024 09:06 AM    K 4.5 06/17/2024 05:47 PM    CL 97 12/02/2024 09:06 AM    CO2 22 12/02/2024 09:06 AM    BUN 19 12/02/2024 09:06 AM    CREATININE 1.0 12/02/2024 09:06 AM    GLUCOSE 92 12/02/2024 09:06 AM    GLUCOSE 97 12/13/2011 08:14 AM    CALCIUM 9.5 12/02/2024 09:06 AM    LABGLOM 73 12/02/2024 09:06 AM    LABGLOM 54 02/12/2024 08:59 AM

## 2025-05-07 ENCOUNTER — PATIENT MESSAGE (OUTPATIENT)
Dept: FAMILY MEDICINE CLINIC | Age: 85
End: 2025-05-07

## 2025-05-07 RX ORDER — DONEPEZIL HYDROCHLORIDE 5 MG/1
5 TABLET, FILM COATED ORAL NIGHTLY
Qty: 90 TABLET | Refills: 1 | Status: SHIPPED | OUTPATIENT
Start: 2025-05-07

## 2025-06-13 ENCOUNTER — OFFICE VISIT (OUTPATIENT)
Dept: FAMILY MEDICINE CLINIC | Age: 85
End: 2025-06-13

## 2025-06-13 VITALS
HEART RATE: 89 BPM | OXYGEN SATURATION: 98 % | TEMPERATURE: 97.2 F | SYSTOLIC BLOOD PRESSURE: 128 MMHG | BODY MASS INDEX: 30.54 KG/M2 | WEIGHT: 172.4 LBS | DIASTOLIC BLOOD PRESSURE: 80 MMHG

## 2025-06-13 DIAGNOSIS — R73.03 PREDIABETES: ICD-10-CM

## 2025-06-13 DIAGNOSIS — I10 PRIMARY HYPERTENSION: Primary | ICD-10-CM

## 2025-06-13 DIAGNOSIS — E78.2 MIXED HYPERLIPIDEMIA: ICD-10-CM

## 2025-06-13 SDOH — ECONOMIC STABILITY: FOOD INSECURITY: WITHIN THE PAST 12 MONTHS, THE FOOD YOU BOUGHT JUST DIDN'T LAST AND YOU DIDN'T HAVE MONEY TO GET MORE.: NEVER TRUE

## 2025-06-13 SDOH — ECONOMIC STABILITY: FOOD INSECURITY: WITHIN THE PAST 12 MONTHS, YOU WORRIED THAT YOUR FOOD WOULD RUN OUT BEFORE YOU GOT MONEY TO BUY MORE.: NEVER TRUE

## 2025-06-13 ASSESSMENT — PATIENT HEALTH QUESTIONNAIRE - PHQ9
SUM OF ALL RESPONSES TO PHQ QUESTIONS 1-9: 0
SUM OF ALL RESPONSES TO PHQ QUESTIONS 1-9: 0
1. LITTLE INTEREST OR PLEASURE IN DOING THINGS: NOT AT ALL
SUM OF ALL RESPONSES TO PHQ QUESTIONS 1-9: 0
2. FEELING DOWN, DEPRESSED OR HOPELESS: NOT AT ALL
SUM OF ALL RESPONSES TO PHQ QUESTIONS 1-9: 0

## 2025-06-13 NOTE — PROGRESS NOTES
Jeff Berman (:  1940) is a 84 y.o. male,Established patient, here for evaluation of the following chief complaint(s):  3 Month Follow-Up      Assessment & Plan   ASSESSMENT/PLAN:  Jeff was seen today for 3 month follow-up.    Diagnoses and all orders for this visit:    Primary hypertension  -     Lipid Panel; Future  -     Comprehensive Metabolic Panel; Future  -     CBC; Future  The current medical regimen is effective;  continue present plan and medications.   Prediabetes  -     Hemoglobin A1C; Future  Stable with diet  Mixed hyperlipidemia  Stable on crestor       No follow-ups on file.         Subjective   SUBJECTIVE/OBJECTIVE:  HPI  Pt is a of 84 y.o. male comes in today with   Chief Complaint   Patient presents with    3 Month Follow-Up   Taking meds as prescribed.  No concerns today  Vitals:    25 0823   BP: 128/80   Pulse: 89   Temp: 97.2 °F (36.2 °C)   TempSrc: Temporal   SpO2: 98%   Weight: 78.2 kg (172 lb 6.4 oz)       Past Medical History:Reviewed  Medications:Reviewed.  Allergies   Allergen Reactions    Demerol Nausea And Vomiting    Meperidine Nausea And Vomiting    Oxycodone Hcl Nausea And Vomiting      Social hx:Reviewed.  Social History     Tobacco Use   Smoking Status Never    Passive exposure: Never   Smokeless Tobacco Never   Tobacco Comments    DENIES SMOKING/VAPING,REC DRUGS, AWARE NOT TO SMOKE DAY BEFORE OR DOS-DG        Review of Systems       Objective   Physical Exam  Constitutional:       General: He is not in acute distress.     Appearance: Normal appearance. He is well-developed. He is not diaphoretic.   HENT:      Head: Normocephalic and atraumatic.      Mouth/Throat:      Mouth: Mucous membranes are moist.      Pharynx: Oropharynx is clear.   Eyes:      General: No scleral icterus.  Neck:      Thyroid: No thyromegaly.      Trachea: No tracheal deviation.   Cardiovascular:      Rate and Rhythm: Normal rate and regular rhythm.      Heart sounds: Normal heart sounds.

## 2025-06-16 DIAGNOSIS — R73.03 PREDIABETES: ICD-10-CM

## 2025-06-16 DIAGNOSIS — I10 PRIMARY HYPERTENSION: ICD-10-CM

## 2025-06-16 LAB
ALBUMIN SERPL-MCNC: 4.1 G/DL (ref 3.4–5)
ALBUMIN/GLOB SERPL: 1.6 {RATIO} (ref 1.1–2.2)
ALP SERPL-CCNC: 108 U/L (ref 40–129)
ALT SERPL-CCNC: 31 U/L (ref 10–40)
ANION GAP SERPL CALCULATED.3IONS-SCNC: 11 MMOL/L (ref 3–16)
AST SERPL-CCNC: 35 U/L (ref 15–37)
BILIRUB SERPL-MCNC: 1.1 MG/DL (ref 0–1)
BUN SERPL-MCNC: 19 MG/DL (ref 7–20)
CALCIUM SERPL-MCNC: 9.5 MG/DL (ref 8.3–10.6)
CHLORIDE SERPL-SCNC: 95 MMOL/L (ref 99–110)
CHOLEST SERPL-MCNC: 137 MG/DL (ref 0–199)
CO2 SERPL-SCNC: 25 MMOL/L (ref 21–32)
CREAT SERPL-MCNC: 1.1 MG/DL (ref 0.8–1.3)
DEPRECATED RDW RBC AUTO: 15.7 % (ref 12.4–15.4)
EST. AVERAGE GLUCOSE BLD GHB EST-MCNC: 128.4 MG/DL
GFR SERPLBLD CREATININE-BSD FMLA CKD-EPI: 65 ML/MIN/{1.73_M2}
GLUCOSE SERPL-MCNC: 89 MG/DL (ref 70–99)
HBA1C MFR BLD: 6.1 %
HCT VFR BLD AUTO: 47 % (ref 40.5–52.5)
HDLC SERPL-MCNC: 48 MG/DL (ref 40–60)
HGB BLD-MCNC: 15.8 G/DL (ref 13.5–17.5)
LDLC SERPL CALC-MCNC: 79 MG/DL
MCH RBC QN AUTO: 30.2 PG (ref 26–34)
MCHC RBC AUTO-ENTMCNC: 33.6 G/DL (ref 31–36)
MCV RBC AUTO: 89.7 FL (ref 80–100)
PLATELET # BLD AUTO: 233 K/UL (ref 135–450)
PMV BLD AUTO: 7.7 FL (ref 5–10.5)
POTASSIUM SERPL-SCNC: 5.1 MMOL/L (ref 3.5–5.1)
PROT SERPL-MCNC: 6.6 G/DL (ref 6.4–8.2)
RBC # BLD AUTO: 5.24 M/UL (ref 4.2–5.9)
SODIUM SERPL-SCNC: 131 MMOL/L (ref 136–145)
TRIGL SERPL-MCNC: 52 MG/DL (ref 0–150)
VLDLC SERPL CALC-MCNC: 10 MG/DL
WBC # BLD AUTO: 5.3 K/UL (ref 4–11)

## 2025-06-19 ENCOUNTER — RESULTS FOLLOW-UP (OUTPATIENT)
Dept: FAMILY MEDICINE CLINIC | Age: 85
End: 2025-06-19

## 2025-06-23 RX ORDER — TAMSULOSIN HYDROCHLORIDE 0.4 MG/1
0.8 CAPSULE ORAL DAILY
Qty: 180 CAPSULE | Refills: 3 | Status: SHIPPED | OUTPATIENT
Start: 2025-06-23

## 2025-06-23 NOTE — TELEPHONE ENCOUNTER
Recent Visits  Date Type Provider Dept   06/13/25 Office Visit Blas Arzate MD [facility]   03/10/25 Office Visit Blas Arzate MD [facility]   12/05/24 Office Visit Blas Ar[facility]ate MD [facility]   08/30/24 Office Visit Blas Arzate MD [facility]   07/01/24 Office Visit Blas Arzate MD [facility]   02/16/24 Office Visit Blas Arzate MD [facility]   Showing recent visits within past 540 days with a meds authorizing provider and meeting all other requirements  Future Appointments  Date Type Provider Dept   09/15/25 Appointment Blas Arzate MD [facility]   Showing future appointments within next 150 days with a meds authorizing provider and meeting all other requirements

## 2025-07-17 RX ORDER — DUTASTERIDE 0.5 MG/1
0.5 CAPSULE, LIQUID FILLED ORAL DAILY
Qty: 90 CAPSULE | Refills: 1 | Status: SHIPPED | OUTPATIENT
Start: 2025-07-17

## 2025-07-17 NOTE — TELEPHONE ENCOUNTER
Medication:   Requested Prescriptions     Pending Prescriptions Disp Refills    dutasteride (AVODART) 0.5 MG capsule [Pharmacy Med Name: DUTASTERIDE 0.5MG CAPSULES] 90 capsule 1     Sig: TAKE 1 CAPSULE BY MOUTH DAILY        Last Filled:      Patient Phone Number: [phone] (home)     Last appt: 6/13/2025   Next appt: 9/15/2025    Last OARRS:        No data to display

## 2025-07-21 RX ORDER — ROSUVASTATIN CALCIUM 10 MG/1
10 TABLET, COATED ORAL DAILY
Qty: 90 TABLET | Refills: 3 | Status: SHIPPED | OUTPATIENT
Start: 2025-07-21

## 2025-07-21 NOTE — TELEPHONE ENCOUNTER
Medication:   Requested Prescriptions     Pending Prescriptions Disp Refills    rosuvastatin (CRESTOR) 10 MG tablet [Pharmacy Med Name: ROSUVASTATIN 10MG TABLETS] 90 tablet 3     Sig: TAKE 1 TABLET BY MOUTH EVERY DAY       Last Filled:  7/29/24    Patient Phone Number: [phone] (home)     Last appt: 6/13/2025   Next appt: 9/15/2025    Last Labs DM:   Lab Results   Component Value Date/Time    LABA1C 6.1 06/16/2025 08:19 AM     Last Lipid:   Lab Results   Component Value Date/Time    CHOL 137 06/16/2025 08:19 AM    TRIG 52 06/16/2025 08:19 AM    HDL 48 06/16/2025 08:19 AM    HDL 43 05/23/2012 06:31 AM     Last PSA:   Lab Results   Component Value Date/Time    PSA 3.65 08/21/2020 08:35 AM     Last Thyroid:   Lab Results   Component Value Date/Time    TSH 1.42 04/20/2016 08:40 AM    T4FREE 1.4 04/20/2016 08:40 AM

## 2025-08-11 RX ORDER — OXYMETAZOLINE HYDROCHLORIDE 0.05 G/100ML
SPRAY NASAL
COMMUNITY
Start: 2025-03-14

## 2025-08-11 RX ORDER — TAMSULOSIN HYDROCHLORIDE 0.4 MG/1
CAPSULE ORAL
COMMUNITY
Start: 2025-03-14

## 2025-08-11 RX ORDER — METOPROLOL SUCCINATE 25 MG/1
TABLET, EXTENDED RELEASE ORAL
COMMUNITY
Start: 2025-03-14

## 2025-08-11 RX ORDER — DUTASTERIDE 0.5 MG/1
CAPSULE, LIQUID FILLED ORAL
COMMUNITY
Start: 2025-03-14

## 2025-08-11 RX ORDER — ROSUVASTATIN CALCIUM 10 MG/1
TABLET, COATED ORAL
COMMUNITY
Start: 2025-03-14

## 2025-08-11 RX ORDER — PHENOL 1.4 %
AEROSOL, SPRAY (ML) MUCOUS MEMBRANE
COMMUNITY
Start: 2025-03-14

## 2025-08-11 RX ORDER — DONEPEZIL HYDROCHLORIDE 5 MG/1
TABLET, FILM COATED ORAL
COMMUNITY
Start: 2025-03-14

## 2025-08-15 ENCOUNTER — OFFICE VISIT (OUTPATIENT)
Dept: SURGERY | Age: 85
End: 2025-08-15
Payer: MEDICARE

## 2025-08-15 VITALS
DIASTOLIC BLOOD PRESSURE: 90 MMHG | BODY MASS INDEX: 30.3 KG/M2 | SYSTOLIC BLOOD PRESSURE: 122 MMHG | TEMPERATURE: 97.5 F | HEART RATE: 92 BPM | WEIGHT: 171 LBS | HEIGHT: 63 IN

## 2025-08-15 DIAGNOSIS — C43.59 MALIGNANT MELANOMA OF TORSO EXCLUDING BREAST (HCC): Primary | ICD-10-CM

## 2025-08-15 PROCEDURE — 1160F RVW MEDS BY RX/DR IN RCRD: CPT | Performed by: NURSE PRACTITIONER

## 2025-08-15 PROCEDURE — 99213 OFFICE O/P EST LOW 20 MIN: CPT | Performed by: NURSE PRACTITIONER

## 2025-08-15 PROCEDURE — 1123F ACP DISCUSS/DSCN MKR DOCD: CPT | Performed by: NURSE PRACTITIONER

## 2025-08-15 PROCEDURE — 1159F MED LIST DOCD IN RCRD: CPT | Performed by: NURSE PRACTITIONER

## 2025-08-15 RX ORDER — METOPROLOL SUCCINATE 25 MG/1
25 TABLET, EXTENDED RELEASE ORAL DAILY
Qty: 90 TABLET | Refills: 3 | Status: SHIPPED | OUTPATIENT
Start: 2025-08-15

## 2025-08-26 ENCOUNTER — OFFICE VISIT (OUTPATIENT)
Dept: CARDIOLOGY CLINIC | Age: 85
End: 2025-08-26
Payer: MEDICARE

## 2025-08-26 ENCOUNTER — CLINICAL SUPPORT (OUTPATIENT)
Dept: CARDIOLOGY CLINIC | Age: 85
End: 2025-08-26

## 2025-08-26 VITALS
WEIGHT: 171 LBS | HEART RATE: 87 BPM | SYSTOLIC BLOOD PRESSURE: 112 MMHG | DIASTOLIC BLOOD PRESSURE: 70 MMHG | BODY MASS INDEX: 30.29 KG/M2

## 2025-08-26 DIAGNOSIS — I42.9 CARDIOMYOPATHY, UNSPECIFIED TYPE (HCC): ICD-10-CM

## 2025-08-26 DIAGNOSIS — I44.2 CHB (COMPLETE HEART BLOCK) (HCC): ICD-10-CM

## 2025-08-26 DIAGNOSIS — Z95.0 PACEMAKER: ICD-10-CM

## 2025-08-26 DIAGNOSIS — I47.19 ATRIAL TACHYCARDIA: ICD-10-CM

## 2025-08-26 DIAGNOSIS — I49.5 SSS (SICK SINUS SYNDROME) (HCC): ICD-10-CM

## 2025-08-26 DIAGNOSIS — Z95.0 CARDIAC PACEMAKER IN SITU: Primary | ICD-10-CM

## 2025-08-26 DIAGNOSIS — I48.0 PAROXYSMAL ATRIAL FIBRILLATION (HCC): Primary | ICD-10-CM

## 2025-08-26 DIAGNOSIS — I45.5 SINUS PAUSE: ICD-10-CM

## 2025-08-26 DIAGNOSIS — Z79.01 ON CONTINUOUS ORAL ANTICOAGULATION: ICD-10-CM

## 2025-08-26 DIAGNOSIS — I10 ESSENTIAL HYPERTENSION: ICD-10-CM

## 2025-08-26 PROCEDURE — 99215 OFFICE O/P EST HI 40 MIN: CPT | Performed by: NURSE PRACTITIONER

## 2025-08-26 PROCEDURE — 93000 ELECTROCARDIOGRAM COMPLETE: CPT | Performed by: NURSE PRACTITIONER

## 2025-08-26 PROCEDURE — 1123F ACP DISCUSS/DSCN MKR DOCD: CPT | Performed by: NURSE PRACTITIONER

## 2025-08-26 PROCEDURE — 1159F MED LIST DOCD IN RCRD: CPT | Performed by: NURSE PRACTITIONER

## 2025-08-26 PROCEDURE — 1160F RVW MEDS BY RX/DR IN RCRD: CPT | Performed by: NURSE PRACTITIONER

## 2025-09-04 DIAGNOSIS — R73.03 PREDIABETES: Primary | ICD-10-CM

## (undated) DEVICE — 40583 XL ADVANCED TRENDELENBURG POSITIONING KIT: Brand: 40583 XL ADVANCED TRENDELENBURG POSITIONING KIT

## (undated) DEVICE — LAPAROSCOPIC ACCESS SYSTEM: Brand: ALEXIS LAPAROSCOPIC SYSTEM WITH KII FIOS FIRST ENTRY

## (undated) DEVICE — Z DISCONTINUED NEEDLE HYPO 22GA L1.5IN BLK POLYPR HUB S STL REG BVL STR - SEE COMMENT

## (undated) DEVICE — STAPLER 60 RELOAD BLUE: Brand: SUREFORM

## (undated) DEVICE — TOWEL,STOP FLAG GOLD N-W: Brand: MEDLINE

## (undated) DEVICE — SOLUTION BOWL: Brand: KENDALL

## (undated) DEVICE — Device

## (undated) DEVICE — ARM DRAPE

## (undated) DEVICE — TRAP SPEC RETRV CLR PLAS POLYP IN LN SUCT QUIK CTCH

## (undated) DEVICE — SEALER/DIVIDER LAP SHFT L44CM JAW APER 11.4MM 315DEG ROT

## (undated) DEVICE — SUTURE VCRL SZ 3-0 L18IN ABSRB UD L26MM SH 1/2 CIR J864D

## (undated) DEVICE — LEGGINGS, PAIR, 33X51 XL, STERILE: Brand: MEDLINE

## (undated) DEVICE — STERILE PVP: Brand: MEDLINE INDUSTRIES, INC.

## (undated) DEVICE — DRAPE,T,LAPARO,TRANS,STERILE: Brand: MEDLINE

## (undated) DEVICE — REDUCER: Brand: ENDOWRIST

## (undated) DEVICE — SOLUTION INJ LR VISIV 1000ML BG

## (undated) DEVICE — STAPLER INT L75MM CUT LN L73MM STPL LN L77MM BLU B FRM 8

## (undated) DEVICE — STAPLER EXT 65MM S STL AUTO DISP PURSTRING

## (undated) DEVICE — GLOVE SURG SZ 7 L12IN FNGR THK79MIL GRN LTX FREE

## (undated) DEVICE — ADHESIVE SKIN CLOSURE WND 8.661X1.5 IN 22 CM LIQUIBAND SECUR

## (undated) DEVICE — 3M™ IOBAN™ 2 ANTIMICROBIAL INCISE DRAPE 6648EZ: Brand: IOBAN™ 2

## (undated) DEVICE — INTENDED FOR TISSUE SEPARATION, AND OTHER PROCEDURES THAT REQUIRE A SHARP SURGICAL BLADE TO PUNCTURE OR CUT.: Brand: BARD-PARKER ® CARBON RIB-BACK BLADES

## (undated) DEVICE — CANNULA SEAL

## (undated) DEVICE — SPONGE,LAP,18"X18",DLX,XR,ST,5/PK,40/PK: Brand: MEDLINE

## (undated) DEVICE — TIP-UP FENESTRATED GRASPER: Brand: ENDOWRIST

## (undated) DEVICE — SOLUTION ANTIFOG VIS SYS CLEARIFY LAPSCP

## (undated) DEVICE — LIQUIBAND RAPID ADHESIVE 36/CS 0.8ML: Brand: MEDLINE

## (undated) DEVICE — SUTURE VCRL + SZ 0 L18IN ABSRB UD L36MM CT-1 1/2 CIR VCP840D

## (undated) DEVICE — SCISSORS SURG DIA8MM MPLR CRV ENDOWRIST

## (undated) DEVICE — SEAL

## (undated) DEVICE — 3M™ TEGADERM™ TRANSPARENT FILM DRESSING FRAME STYLE, 1628, 6 IN X 8 IN (15 CM X 20 CM), 10/CT 8CT/CASE: Brand: 3M™ TEGADERM™

## (undated) DEVICE — TROCAR: Brand: KII FIOS FIRST ENTRY

## (undated) DEVICE — PUMP SUC IRR TBNG L10FT W/ HNDPC ASSEMB STRYKEFLOW 2

## (undated) DEVICE — FORCEPS BX L240CM JAW DIA2.8MM L CAP W/ NDL MIC MESH TOOTH

## (undated) DEVICE — STAPLER INT DIA29MM CLS STPL H1.5-2.2MM OPN LEG L5.2MM 26

## (undated) DEVICE — FENESTRATED BIPOLAR FORCEPS: Brand: ENDOWRIST

## (undated) DEVICE — SUTURE PERMAHAND SZ 2-0 L30IN NONABSORBABLE BLK SILK W/O A305H

## (undated) DEVICE — 3M™ TEGADERM™ TRANSPARENT FILM DRESSING FRAME STYLE, 1626W, 4 IN X 4-3/4 IN (10 CM X 12 CM), 50/CT 4CT/CASE: Brand: 3M™ TEGADERM™

## (undated) DEVICE — BLADELESS OBTURATOR: Brand: WECK VISTA

## (undated) DEVICE — TROCAR: Brand: KII SHIELDED BLADED ACCESS SYSTEM

## (undated) DEVICE — SUTURE VCRL SZ 0 L27IN ABSRB UD L36MM CT-1 1/2 CIR J260H

## (undated) DEVICE — SYRINGE CATH TIP 50ML

## (undated) DEVICE — SYSTEM SMK EVAC LAP TBNG FILTER HSNG BENT STYL PNK SEE CLR

## (undated) DEVICE — Device: Brand: SPOT EX ENDOSCOPIC TATTOO

## (undated) DEVICE — ADHESIVE SKIN CLSR 0.7ML TOP DERMBND ADV

## (undated) DEVICE — SUTURE VCRL + SZ 2-0 L18IN ABSRB VLT L26MM SH 1/2 CIR VCP775D

## (undated) DEVICE — SHEET,DRAPE,53X77,STERILE: Brand: MEDLINE

## (undated) DEVICE — CATHETER DRNGE 34FR 2 EYE PROPORTIONATE HD DISP FOR

## (undated) DEVICE — 3M™ TEGADERM™ TRANSPARENT FILM DRESSING FRAME STYLE, 1627, 4 IN X 10 IN (10 CM X 25 CM), 20/CT 4CT/CASE: Brand: 3M™ TEGADERM™

## (undated) DEVICE — PREMIUM WET SKIN PREP TRAY: Brand: MEDLINE INDUSTRIES, INC.

## (undated) DEVICE — APPLICATOR MEDICATED 26 CC SOLUTION HI LT ORNG CHLORAPREP

## (undated) DEVICE — DRAPE,UNDERBUTTOCKS,PCH,STERILE: Brand: MEDLINE

## (undated) DEVICE — THE ARTICULATOR INJECTION NEEDLE IS A SINGLE USE, DISPOSABLE, FLEXIBLE SHEATH INJECTION NEEDLE USED FOR THE INJECTION OF VARIOUS TYPES OF MEDIA THROUGH FLEXIBLE ENDOSCOPES.: Brand: ARTICULATOR

## (undated) DEVICE — ROBOTIC: Brand: MEDLINE INDUSTRIES, INC.

## (undated) DEVICE — SOLUTION IV 1000ML 0.9% SOD CHL PH 5 INJ USP VIAFLX PLAS

## (undated) DEVICE — TOTAL TRAY, 16FR 10ML SIL FOLEY, URN: Brand: MEDLINE

## (undated) DEVICE — VESSEL SEALER EXTEND: Brand: ENDOWRIST

## (undated) DEVICE — GLOVE SURG SZ 7 CRM LTX FREE POLYISOPRENE POLYMER BEAD ANTI

## (undated) DEVICE — SYRINGE MED 10ML SLIP TIP BLNT FILL AND LUERLOCK DISP

## (undated) DEVICE — SNARE COLD DIAMOND 10MM THIN

## (undated) DEVICE — BLADE ES ELASTOMERIC COAT INSUL DURABLE BEND UPTO 90DEG

## (undated) DEVICE — SUTURE MCRYL SZ 4-0 L27IN ABSRB UD L19MM PS-2 1/2 CIR PRIM Y426H

## (undated) DEVICE — APPLIER LIG CLP M L11IN TI STR RNG HNDL FOR 20 CLP DISP

## (undated) DEVICE — CLEANER,CAUTERY TIP,2X2",STERILE: Brand: MEDLINE

## (undated) DEVICE — GAUZE,SPONGE,4"X4",16PLY,XRAY,STRL,LF: Brand: MEDLINE

## (undated) DEVICE — SUTURE VCRL + SZ 3-0 L18IN ABSRB UD SH 1/2 CIR TAPERCUT NDL VCP864D

## (undated) DEVICE — GLOVE SURG SZ 7 L12IN FNGR THK75MIL WHT LTX POLYMER BEAD

## (undated) DEVICE — GENERAL: Brand: MEDLINE INDUSTRIES, INC.

## (undated) DEVICE — SUTURE PERMAHAND SZ 3-0 L30IN NONABSORBABLE BLK SILK BRAID A304H